# Patient Record
Sex: FEMALE | Race: WHITE | Employment: FULL TIME | URBAN - NONMETROPOLITAN AREA
[De-identification: names, ages, dates, MRNs, and addresses within clinical notes are randomized per-mention and may not be internally consistent; named-entity substitution may affect disease eponyms.]

---

## 2018-05-18 ENCOUNTER — HOSPITAL ENCOUNTER (EMERGENCY)
Age: 23
Discharge: HOME | End: 2018-05-18
Payer: COMMERCIAL

## 2018-05-18 ENCOUNTER — APPOINTMENT (OUTPATIENT)
Dept: GENERAL RADIOLOGY | Age: 23
End: 2018-05-18
Payer: COMMERCIAL

## 2018-05-18 VITALS
DIASTOLIC BLOOD PRESSURE: 66 MMHG | OXYGEN SATURATION: 99 % | TEMPERATURE: 98.4 F | HEART RATE: 88 BPM | WEIGHT: 130 LBS | RESPIRATION RATE: 16 BRPM | SYSTOLIC BLOOD PRESSURE: 108 MMHG

## 2018-05-18 DIAGNOSIS — S90.852A FOREIGN BODY IN LEFT FOOT, INITIAL ENCOUNTER: Primary | ICD-10-CM

## 2018-05-18 PROCEDURE — 99283 EMERGENCY DEPT VISIT LOW MDM: CPT

## 2018-05-18 PROCEDURE — 90471 IMMUNIZATION ADMIN: CPT | Performed by: PHYSICIAN ASSISTANT

## 2018-05-18 PROCEDURE — 28190 REMOVAL OF FOOT FOREIGN BODY: CPT

## 2018-05-18 PROCEDURE — 6360000002 HC RX W HCPCS: Performed by: PHYSICIAN ASSISTANT

## 2018-05-18 PROCEDURE — 73620 X-RAY EXAM OF FOOT: CPT

## 2018-05-18 PROCEDURE — 90715 TDAP VACCINE 7 YRS/> IM: CPT | Performed by: PHYSICIAN ASSISTANT

## 2018-05-18 RX ADMIN — TETANUS TOXOID, REDUCED DIPHTHERIA TOXOID AND ACELLULAR PERTUSSIS VACCINE, ADSORBED 0.5 ML: 5; 2.5; 8; 8; 2.5 SUSPENSION INTRAMUSCULAR at 15:18

## 2018-05-18 ASSESSMENT — ENCOUNTER SYMPTOMS: ROS SKIN COMMENTS: SEE HPI

## 2020-01-23 ENCOUNTER — INITIAL PRENATAL (OUTPATIENT)
Dept: OBGYN | Age: 25
End: 2020-01-23
Payer: COMMERCIAL

## 2020-01-23 ENCOUNTER — HOSPITAL ENCOUNTER (OUTPATIENT)
Age: 25
Setting detail: SPECIMEN
Discharge: HOME | End: 2020-01-23
Payer: COMMERCIAL

## 2020-01-23 VITALS
HEIGHT: 63 IN | WEIGHT: 143 LBS | BODY MASS INDEX: 25.34 KG/M2 | SYSTOLIC BLOOD PRESSURE: 108 MMHG | HEART RATE: 98 BPM | DIASTOLIC BLOOD PRESSURE: 70 MMHG

## 2020-01-23 LAB
ABO/RH: NORMAL
ABSOLUTE EOS #: 0.04 K/UL (ref 0–0.44)
ABSOLUTE IMMATURE GRANULOCYTE: <0.03 K/UL (ref 0–0.3)
ABSOLUTE LYMPH #: 1.27 K/UL (ref 1.1–3.7)
ABSOLUTE MONO #: 0.66 K/UL (ref 0.1–1.2)
AMPHETAMINE SCREEN URINE: NEGATIVE
ANTIBODY SCREEN: NEGATIVE
BARBITURATE SCREEN URINE: NEGATIVE
BASOPHILS # BLD: 0 % (ref 0–2)
BASOPHILS ABSOLUTE: <0.03 K/UL (ref 0–0.2)
BENZODIAZEPINE SCREEN, URINE: NEGATIVE
BUPRENORPHINE URINE: NEGATIVE
CANNABINOID SCREEN URINE: NEGATIVE
COCAINE METABOLITE, URINE: NEGATIVE
DIFFERENTIAL TYPE: ABNORMAL
EOSINOPHILS RELATIVE PERCENT: 1 % (ref 1–4)
HCT VFR BLD CALC: 38.5 % (ref 36.3–47.1)
HEMOGLOBIN: 12.8 G/DL (ref 11.9–15.1)
HEPATITIS B SURFACE ANTIGEN: NONREACTIVE
HEPATITIS C ANTIBODY: NONREACTIVE
HIV AG/AB: NONREACTIVE
IMMATURE GRANULOCYTES: 0 %
LYMPHOCYTES # BLD: 21 % (ref 24–43)
MCH RBC QN AUTO: 29.8 PG (ref 25.2–33.5)
MCHC RBC AUTO-ENTMCNC: 33.2 G/DL (ref 28.4–34.8)
MCV RBC AUTO: 89.5 FL (ref 82.6–102.9)
MDMA URINE: NORMAL
METHADONE SCREEN, URINE: NEGATIVE
METHAMPHETAMINE, URINE: NEGATIVE
MONOCYTES # BLD: 11 % (ref 3–12)
NRBC AUTOMATED: 0 PER 100 WBC
OPIATES, URINE: NEGATIVE
OXYCODONE SCREEN URINE: NEGATIVE
PDW BLD-RTO: 15.2 % (ref 11.8–14.4)
PHENCYCLIDINE, URINE: NEGATIVE
PLATELET # BLD: 253 K/UL (ref 138–453)
PLATELET ESTIMATE: ABNORMAL
PMV BLD AUTO: 10.1 FL (ref 8.1–13.5)
PROPOXYPHENE, URINE: NEGATIVE
RBC # BLD: 4.3 M/UL (ref 3.95–5.11)
RBC # BLD: ABNORMAL 10*6/UL
RUBV IGG SER QL: 120 IU/ML
SEG NEUTROPHILS: 67 % (ref 36–65)
SEGMENTED NEUTROPHILS ABSOLUTE COUNT: 4.16 K/UL (ref 1.5–8.1)
T. PALLIDUM, IGG: NONREACTIVE
TEST INFORMATION: NORMAL
TRICYCLIC ANTIDEPRESSANTS, UR: NEGATIVE
WBC # BLD: 6.2 K/UL (ref 3.5–11.3)
WBC # BLD: ABNORMAL 10*3/UL

## 2020-01-23 PROCEDURE — 86803 HEPATITIS C AB TEST: CPT

## 2020-01-23 PROCEDURE — 86780 TREPONEMA PALLIDUM: CPT

## 2020-01-23 PROCEDURE — 86850 RBC ANTIBODY SCREEN: CPT

## 2020-01-23 PROCEDURE — 87340 HEPATITIS B SURFACE AG IA: CPT

## 2020-01-23 PROCEDURE — 87491 CHLMYD TRACH DNA AMP PROBE: CPT

## 2020-01-23 PROCEDURE — 87086 URINE CULTURE/COLONY COUNT: CPT

## 2020-01-23 PROCEDURE — 86900 BLOOD TYPING SEROLOGIC ABO: CPT

## 2020-01-23 PROCEDURE — 86901 BLOOD TYPING SEROLOGIC RH(D): CPT

## 2020-01-23 PROCEDURE — 86762 RUBELLA ANTIBODY: CPT

## 2020-01-23 PROCEDURE — 87389 HIV-1 AG W/HIV-1&-2 AB AG IA: CPT

## 2020-01-23 PROCEDURE — 85025 COMPLETE CBC W/AUTO DIFF WBC: CPT

## 2020-01-23 PROCEDURE — 80306 DRUG TEST PRSMV INSTRMNT: CPT

## 2020-01-23 PROCEDURE — 0500F INITIAL PRENATAL CARE VISIT: CPT | Performed by: ADVANCED PRACTICE MIDWIFE

## 2020-01-23 PROCEDURE — 87591 N.GONORRHOEAE DNA AMP PROB: CPT

## 2020-01-23 RX ORDER — PNV NO.95/FERROUS FUM/FOLIC AC 28MG-0.8MG
1 TABLET ORAL
COMMUNITY

## 2020-01-23 ASSESSMENT — PATIENT HEALTH QUESTIONNAIRE - PHQ9
SUM OF ALL RESPONSES TO PHQ QUESTIONS 1-9: 0
1. LITTLE INTEREST OR PLEASURE IN DOING THINGS: 0
2. FEELING DOWN, DEPRESSED OR HOPELESS: 0
SUM OF ALL RESPONSES TO PHQ QUESTIONS 1-9: 0
SUM OF ALL RESPONSES TO PHQ9 QUESTIONS 1 & 2: 0

## 2020-01-23 NOTE — PROGRESS NOTES
New OB Visit    Date of service: 2020    Maribell Bowers  Is a 21 y.o.  female presenting for a New OB visit with Nurse. Name of Father of Juvenal Burnett is Qamar Parish and is involved. Pt does work at [employer]. [facility] Had dating U/S no prenatal labs done. May Jina all prenatal labs at office today. PT's PCP is: Annette Danielson MD     : 1995                                             Subjective:       Patient's last menstrual period was 10/30/2019. OB History    Para Term  AB Living   2 0 0 0 1 0   SAB TAB Ectopic Molar Multiple Live Births   0 0 0 0 0 0      # Outcome Date GA Lbr Abraham/2nd Weight Sex Delivery Anes PTL Lv   2 Current            1 AB 2016                     Social History     Tobacco Use   Smoking Status Never Smoker   Smokeless Tobacco Never Used        Social History     Substance and Sexual Activity   Alcohol Use No        Allergies: Patient has no known allergies. Current Outpatient Medications:     Prenatal Vit-Fe Fumarate-FA (PRENATAL VITAMIN) 27-0.8 MG TABS, Take 1 tablet by mouth, Disp: , Rfl:     escitalopram (LEXAPRO) 20 MG tablet, Take 20 mg by mouth daily, Disp: , Rfl:     ferrous sulfate 325 (65 FE) MG EC tablet, Take 1 tablet by mouth daily (Patient not taking: Reported on 2020), Disp: 90 tablet, Rfl: 0    ascorbic acid (VITAMIN C) 500 MG tablet, Take 500 mg by mouth daily. , Disp: , Rfl:     Multiple Vitamins-Minerals (MULTI COMPLETE PO), Take  by mouth., Disp: , Rfl:       Vital Signs Height 5' 3\" (1.6 m), weight 143 lb (64.9 kg), last menstrual period 10/30/2019. No results found for this visit on 20. Pain: none                            Nausea: not anymore      Vomiting: not anymore        Breast enlargement or tenderness: not anymore    Frequency of urination:yes      Fatigue: no         Patient history reviewed. Educational materials given and genetic testing reviewed. Breastfeeding handouts given and reviewed: Yes     If BMI over 35 was HgA1C drawn: N/A      If history of thyroid problem was TSH drawn: N/A       My chart set up and activated: Yes    If history of preeclampsia did we start baby ASA: N/A    Education:  Patient Instructions           January 3th (Friday 6:30-9:30) & 4th (Sat 9am-4pm)    February 5th, 12th, & 19th (Wednesdays 6:30-9:30)    March - 6th (Friday 6:30-9:30) & 7th (Sat 9am-4pm)    April - 6th, 13th and 20th (Mondays 6:30 PM to 9:30 PM)    May - 8th (Friday 6:30-9:30) & 9th (Sat 9am-4pm)    June -3rd, 10th, 17th, (Wednesdays from 6:30 PM to 9:30 PM)    July - 24th (Friday 6:30pm-9:30pm) & 25th (Sat 9am-4pm)    August -5th, 12th, 19th, (Wednesdays from 6:30 PM to 9:30 PM)    September - 11th (Friday 6:30-9:30) & 12th (Sat 9am-4pm)    October -7th, 14th, 21st, (Wednesdays from 6:30 PM to 9:30 PM)    November - 6th (Friday 6:30-9:30) & 4th (Sat 9am-4pm)    December - NO CLASSES      There is no charge for classes. Please bring a pillow to class. Bring a support person.     Childbirth education instructors are PAT Real RN and Lucero Cooper RN    To sign up for classes  Call the Obstetrics Department at  [facility] at  [phone]          BREASTFEEDING classes 2020    Classes are held in the [facility] Room 1  Class time 6:30pm-8:30pm    Thursday, January 9 at 6:30 PM    Monday, March 9 at 6:30 PM    Monday, April 27 at 6:30 PM    Thursday, June 25 at 6:30 PM    Thursday, August 27 at 6:30 PM    Thursday, November 12 at 6:30 PM     these classes are free    Classes are taught by Rocio Gray RN, BSN, Diana Archibald 60  Lactation consultant for [facility]    To sign up for classes  call the Obstetrics Department at  [facility] at  [phone]  Patient Education        Learning About Pregnancy  Your Care Instructions    Your health in the early weeks of your pregnancy is particularly handle raw meat, and fully cook all meat before you eat it. Wear gloves when you work in the yard or garden, and wash your hands well when you are done. Cat feces, raw or undercooked meat, and contaminated dirt can cause an infection that may harm your baby or lead to a miscarriage.     · Do not use saunas or hot tubs. Raising your body temperature may harm your baby.     · Avoid chemical fumes, paint fumes, or poisons.     · Do not use illegal drugs or alcohol. Medicines    · Review all of your medicines with your doctor. Some of your routine medicines may need to be changed to protect your baby.     · Use acetaminophen (Tylenol) to relieve minor problems, such as a mild headache or backache or a mild fever with cold symptoms. Do not use nonsteroidal anti-inflammatory drugs (NSAIDs), such as ibuprofen (Advil, Motrin) or naproxen (Aleve), unless your doctor says it is okay.     · Do not take two or more pain medicines at the same time unless the doctor told you to. Many pain medicines have acetaminophen, which is Tylenol. Too much acetaminophen (Tylenol) can be harmful.     · Take your medicines exactly as prescribed. Call your doctor if you think you are having a problem with your medicine.    To manage morning sickness    · If you feel sick when you first wake up, try eating a small snack (such as crackers) before you get out of bed. Allow some time to digest the snack, and then get out of bed slowly.     · Do not skip meals or go for long periods without eating. An empty stomach can make nausea worse.     · Eat small, frequent meals instead of three large meals each day.     · Drink plenty of fluids. Sports drinks, such as Gatorade or Powerade, are good choices.     · Eat foods that are high in protein but low in fat.     · If you are taking iron supplements, ask your doctor if they are necessary.  Iron can make nausea worse.     · Avoid any smells, such as coffee, that make you feel sick.     · Get lots of rest. Morning sickness may be worse when you are tired. Follow-up care is a key part of your treatment and safety. Be sure to make and go to all appointments, and call your doctor if you are having problems. It's also a good idea to know your test results and keep a list of the medicines you take. Where can you learn more? Go to https://chpepiceweb.[email or web address] org and sign in to your INXPO account. Enter G944 in the Spotivate box to learn more about \"Learning About Pregnancy. \"     If you do not have an account, please click on the \"Sign Up Now\" link. Current as of: May 29, 2019  Content Version: 12.3  © 9587-7231 Healthwise, SitScape. Care instructions adapted under license by [facility] ([facility]). If you have questions about a medical condition or this instruction, always ask your healthcare professional. [place] [address] any warranty or liability for your use of this information. Patient Education        Managing Morning Sickness: Care Instructions  Your Care Instructions    For many women, the toughest part of early pregnancy is morning sickness. Morning sickness can range from mild nausea to severe nausea with bouts of vomiting. Symptoms may be worse in the morning, although they can strike at any time of the day or night. If you have nausea, vomiting, or both, look for safe measures that can bring you relief. You can take simple steps at home to manage morning sickness. These steps include changing what and when you eat and avoiding certain foods and smells. Some women find that acupuncture and acupressure wristbands also help. Follow-up care is a key part of your treatment and safety. Be sure to make and go to all appointments, and call your doctor if you are having problems. It's also a good idea to know your test results and keep a list of the medicines you take. How can you care for yourself at home? · Keep food in your stomach, but not too much at once.  Your little urine. ? Feeling thirstier than usual.     · You are not able to keep down your medicine.     · You have pain in your belly or pelvis.    Watch closely for changes in your health, and be sure to contact your doctor if:    · You do not get better as expected. Where can you learn more? Go to https://chpepiceweb.healthUtility [email or web address] org and sign in to your [email or web address] account. Enter K836 in the Scoop.it box to learn more about \"Managing Morning Sickness: Care Instructions. \"     If you do not have an account, please click on the \"Sign Up Now\" link. Current as of: May 29, 2019  Content Version: 12.3  © 7814-5560 EMCAS. Care instructions adapted under license by [facility] ([facility]). If you have questions about a medical condition or this instruction, always ask your healthcare professional. [address] any warranty or liability for your use of this information. Patient Education        Nutrition During Pregnancy: Care Instructions  Your Care Instructions    Healthy eating when you are pregnant is important for you and your baby. It can help you feel well and have a successful pregnancy and delivery. During pregnancy your nutrition needs increase. Even if you have excellent eating habits, your doctor may recommend a multivitamin to make sure you get enough iron and folic acid. Many pregnant women wonder how much weight they should gain. In general, women who were at a healthy weight before they became pregnant should gain between 25 and 35 pounds. Women who were overweight before pregnancy are usually advised to gain 15 to 25 pounds. Women who were underweight before pregnancy are usually advised to gain 28 to 40 pounds. Your doctor will work with you to set a weight goal that is right for you. Gaining a healthy amount of weight helps you have a healthy baby. Follow-up care is a key part of your treatment and safety.  Be sure to make and go to all appointments, and pregnancy, your doctor will work with you to manage your weight gain. · Tell your doctor about all vitamins and supplements you take. When should you call for help? Watch closely for changes in your health, and be sure to contact your doctor if you have any problems. Where can you learn more? Go to https://chpepiceweb.[email or web address] org and sign in to your Keek account. Enter Y785 in the ZipMatch box to learn more about \"Nutrition During Pregnancy: Care Instructions. \"     If you do not have an account, please click on the \"Sign Up Now\" link. Current as of: May 29, 2019  Content Version: 12.3  © 7974-9223 Clipsure. Care instructions adapted under license by [facility] ([facility]). If you have questions about a medical condition or this instruction, always ask your healthcare professional. [address] any warranty or liability for your use of this information. Patient Education        Pregnancy Precautions: Care Instructions  Your Care Instructions    There is no sure way to prevent labor before your due date ( labor) or to prevent most other pregnancy problems. But there are things you can do to increase your chances of a healthy pregnancy. Go to your appointments, follow your doctor's advice, and take good care of yourself. Eat well, and exercise (if your doctor agrees). And make sure to drink plenty of water. Follow-up care is a key part of your treatment and safety. Be sure to make and go to all appointments, and call your doctor if you are having problems. It's also a good idea to know your test results and keep a list of the medicines you take. How can you care for yourself at home? · Make sure you go to your prenatal appointments. At each visit, your doctor will check your blood pressure. Your doctor will also check to see if you have protein in your urine. High blood pressure and protein in urine are signs of preeclampsia.  This condition can be dangerous for you and your baby. · Drink plenty of fluids, enough so that your urine is light yellow or clear like water. Dehydration can cause contractions. If you have kidney, heart, or liver disease and have to limit fluids, talk with your doctor before you increase the amount of fluids you drink. · Tell your doctor right away if you notice any symptoms of an infection, such as:  ? Burning when you urinate. ? A foul-smelling discharge from your vagina. ? Vaginal itching. ? Unexplained fever. ? Unusual pain or soreness in your uterus or lower belly. · Eat a balanced diet. Include plenty of foods that are high in calcium and iron. ? Foods high in calcium include milk, cheese, yogurt, almonds, and broccoli. ? Foods high in iron include red meat, shellfish, poultry, eggs, beans, raisins, whole-grain bread, and leafy green vegetables. · Do not smoke. If you need help quitting, talk to your doctor about stop-smoking programs and medicines. These can increase your chances of quitting for good. · Do not drink alcohol or use illegal drugs. · Follow your doctor's directions about activity. Your doctor will let you know how much, if any, exercise you can do. · Ask your doctor if you can have sex. If you are at risk for early labor, your doctor may ask you to not have sex. · Take care to prevent falls. During pregnancy, your joints are loose, and your balance is off. Sports such as bicycling, skiing, or in-line skating can increase your risk of falling. And don't ride horses or motorcycles, dive, water ski, scuba dive, or parachute jump while you are pregnant. · Avoid getting very hot. Do not use saunas or hot tubs. Avoid staying out in the sun in hot weather for long periods. Take acetaminophen (Tylenol) to lower a high fever. · Do not take any over-the-counter or herbal medicines or supplements without talking to your doctor or pharmacist first.  When should you call for help?   Call 911 anytime you think Incorporated disclaims any warranty or liability for your use of this information. Patient Education        Weeks 6 to 10 of Your Pregnancy: Care Instructions  Your Care Instructions    Congratulations on your pregnancy. This is an exciting and important time for you. During the first 6 to 10 weeks of your pregnancy, your body goes through many changes. Your baby grows very fast, even though you cannot feel it yet. You may start to notice that you feel different, both in your body and your emotions. Because each woman's pregnancy is unique, there is no right way to feel. You may feel the healthiest you have ever been, or you may feel tired or sick to your stomach (\"morning sickness\"). These early weeks are a time to make healthy choices and to eat the best foods for you and your baby. This care sheet will give you some ideas. This is also a good time to think about birth defects testing. These are tests done during pregnancy to look for possible problems with the baby. First trimester tests for birth defects can be done between 8 and 17 weeks of pregnancy, depending on the test. Talk with your doctor about what kinds of tests are available. Follow-up care is a key part of your treatment and safety. Be sure to make and go to all appointments, and call your doctor if you are having problems. It's also a good idea to know your test results and keep a list of the medicines you take. How can you care for yourself at home? Eat well  · Eat at least 3 meals and 2 healthy snacks every day. Eat fresh, whole foods, including:  ? 7 or more servings of bread, tortillas, cereal, rice, pasta, or oatmeal.  ? 3 or more servings of vegetables, especially leafy green vegetables. ? 2 or more servings of fruits. ? 3 or more servings of milk, yogurt, or cheese. ? 2 or more servings of meat, turkey, chicken, fish, eggs, or dried beans. · Drink plenty of fluids, especially water.  Avoid sodas and other sweetened Enter G112 in the [facility] box to learn more about \"Weeks 6 to 10 of Your Pregnancy: Care Instructions. \"     If you do not have an account, please click on the \"Sign Up Now\" link. Current as of: May 29, 2019  Content Version: 12.3  © 0919-6292 Healthwise, Leonar3Do. Care instructions adapted under license by [facility] ([facility]). If you have questions about a medical condition or this instruction, always ask your healthcare professional. [place] [address] any warranty or liability for your use of this information. Patient Education        Learning About When to Call Your Doctor During Pregnancy (Up to 20 Weeks)  Your Care Instructions    It's common to have concerns about what might be a problem during pregnancy. Although most pregnant women don't have any serious problems, it's important to know when to call your doctor if you have certain symptoms. These are general suggestions. Your doctor may give you some more information about when to call. When to call your doctor (up to 20 weeks)  Call 911 anytime you think you may need emergency care. For example, call if:  · You passed out (lost consciousness). Call your doctor now or seek immediate medical care if:  · You have a fever. · You have vaginal bleeding. · You are dizzy or lightheaded, or you feel like you may faint. · You have symptoms of a urinary tract infection. These may include:  ? Pain or burning when you urinate. ? A frequent need to urinate without being able to pass much urine. ? Pain in the flank, which is just below the rib cage and above the waist on either side of the back. ? Blood in your urine. · You have belly pain. · You think you are having contractions. · You have a sudden release of fluid from your vagina. Watch closely for changes in your health, and be sure to contact your doctor if:  · You have vaginal discharge that smells bad. · You have other concerns about your pregnancy.   Follow-up care is a key part of your treatment and safety. Be sure to make and go to all appointments, and call your doctor if you are having problems. It's also a good idea to know your test results and keep a list of the medicines you take. Where can you learn more? Go to https://chpepiceweb.healthAerpio [email or web address] org and sign in to your MetaMaterials account. Enter V846 in the emids box to learn more about \"Learning About When to Call Your Doctor During Pregnancy (Up to 20 Weeks). \"     If you do not have an account, please click on the \"Sign Up Now\" link. Current as of: May 29, 2019  Content Version: 12.3  © 1384-8971 Healthwise, [employer]Metic Life Sciences. Care instructions adapted under license by [facility] ([facility]). If you have questions about a medical condition or this instruction, always ask your healthcare professional. [address] any warranty or liability for your use of this information. Assessment:      Diagnosis Orders   1. Amenorrhea  C.trachomatis N.gonorrhoeae DNA, Urine    Hepatitis C Antibody    HIV Screen    PRENATAL PROFILE I    Prenatal type and screen    Urine Culture    Urine Drug Screen, Comprehensive   2. Positive urine pregnancy test  C.trachomatis N.gonorrhoeae DNA, Urine    Hepatitis C Antibody    HIV Screen    PRENATAL PROFILE I    Prenatal type and screen    Urine Culture    Urine Drug Screen, Comprehensive   3.  Encounter for supervision of normal pregnancy in first trimester, unspecified   C.trachomatis N.gonorrhoeae DNA, Urine    Hepatitis C Antibody    HIV Screen    PRENATAL PROFILE I    Prenatal type and screen    Urine Culture    Urine Drug Screen, Comprehensive       Plan: Order Routine Prenatal Lab Yes     Order additional testing if requested         Order Prenatal Vitamins   No: OTC             Appointment with Betsy Burnett CNM in 1 week for total body exam if indicated      Nurse:   Bienvenido Ragsdale

## 2020-01-24 LAB
C. TRACHOMATIS DNA ,URINE: NEGATIVE
CULTURE: NORMAL
Lab: NORMAL
N. GONORRHOEAE DNA, URINE: NEGATIVE
SPECIMEN DESCRIPTION: NORMAL
SPECIMEN DESCRIPTION: NORMAL

## 2020-01-30 ENCOUNTER — ROUTINE PRENATAL (OUTPATIENT)
Dept: OBGYN | Age: 25
End: 2020-01-30
Payer: COMMERCIAL

## 2020-01-30 ENCOUNTER — HOSPITAL ENCOUNTER (OUTPATIENT)
Age: 25
Setting detail: SPECIMEN
Discharge: HOME | End: 2020-01-30
Payer: COMMERCIAL

## 2020-01-30 VITALS — BODY MASS INDEX: 25.58 KG/M2 | WEIGHT: 144.4 LBS | DIASTOLIC BLOOD PRESSURE: 74 MMHG | SYSTOLIC BLOOD PRESSURE: 112 MMHG

## 2020-01-30 PROCEDURE — G0145 SCR C/V CYTO,THINLAYER,RESCR: HCPCS

## 2020-01-30 PROCEDURE — 0502F SUBSEQUENT PRENATAL CARE: CPT | Performed by: ADVANCED PRACTICE MIDWIFE

## 2020-01-30 NOTE — PROGRESS NOTES
discontinued Delilah Vigil's ascorbic acid, Multiple Vitamins-Minerals (MULTI COMPLETE PO), and escitalopram. I am also having her maintain her ferrous sulfate and Prenatal Vitamin. Return in about 4 weeks (around 2/27/2020) for ob. There are no Patient Instructions on file for this visit.           Aline Roberts,1/30/2020 2:49 PM

## 2020-02-11 LAB — CYTOLOGY REPORT: NORMAL

## 2020-02-24 ENCOUNTER — ROUTINE PRENATAL (OUTPATIENT)
Dept: OBGYN | Age: 25
End: 2020-02-24
Payer: COMMERCIAL

## 2020-02-24 VITALS — SYSTOLIC BLOOD PRESSURE: 112 MMHG | BODY MASS INDEX: 25.86 KG/M2 | WEIGHT: 146 LBS | DIASTOLIC BLOOD PRESSURE: 70 MMHG

## 2020-02-24 PROCEDURE — 0502F SUBSEQUENT PRENATAL CARE: CPT | Performed by: ADVANCED PRACTICE MIDWIFE

## 2020-02-24 RX ORDER — BUTALBITAL, ACETAMINOPHEN AND CAFFEINE 50; 325; 40 MG/1; MG/1; MG/1
TABLET ORAL
Qty: 30 TABLET | Refills: 1 | Status: ON HOLD
Start: 2020-02-24 | End: 2020-08-05 | Stop reason: HOSPADM

## 2020-03-12 ENCOUNTER — ROUTINE PRENATAL (OUTPATIENT)
Dept: OBGYN | Age: 25
End: 2020-03-12
Payer: COMMERCIAL

## 2020-03-12 VITALS — DIASTOLIC BLOOD PRESSURE: 74 MMHG | BODY MASS INDEX: 26.75 KG/M2 | SYSTOLIC BLOOD PRESSURE: 110 MMHG | WEIGHT: 151 LBS

## 2020-03-12 PROCEDURE — 0502F SUBSEQUENT PRENATAL CARE: CPT | Performed by: ADVANCED PRACTICE MIDWIFE

## 2020-03-12 NOTE — PROGRESS NOTES
Konrad Shields is here at 20w0d for:    Chief Complaint   Patient presents with    Routine Prenatal Visit     Follow up in house ultrasound. Estimated Due Date: Estimated Date of Delivery: 20    OB History    Para Term  AB Living   2 0 0 0 1 0   SAB TAB Ectopic Molar Multiple Live Births   0 0 0 0 0 0      # Outcome Date GA Lbr Abraham/2nd Weight Sex Delivery Anes PTL Lv   2 Current            1 AB 2016                Past Medical History:   Diagnosis Date    Endometriosis     Migraine        Past Surgical History:   Procedure Laterality Date    APPENDECTOMY      LAPAROSCOPY      TONSILLECTOMY         Social History     Tobacco Use   Smoking Status Never Smoker   Smokeless Tobacco Never Used        Social History     Substance and Sexual Activity   Alcohol Use No       No results found for this visit on 20. Vitals:  /74   Wt 151 lb (68.5 kg)   LMP 10/30/2019   BMI 26.75 kg/m²   Estimated body mass index is 26.75 kg/m² as calculated from the following:    Height as of 20: 5' 3\" (1.6 m). Weight as of this encounter: 151 lb (68.5 kg). HPI: here for routine ob visit, denies c/o, anatomic sono WNL     PT denies fever, chills, nausea and vomiting       Abdomen: enlarged, gravid, soft, nontender     Results reviewed today:    No results found for this visit on 20. See prenatal vital sign section and fetal assessment section    ASSESSMENT & Plan    Diagnosis Orders   1. Encounter for supervision of normal first pregnancy in second trimester     2. 20 weeks gestation of pregnancy          19.6 WK IUP  CL: 4.3 cm  HR: 156 bpm  Anterior placenta, vertex presentation  Ovaries: WNL  Gender:  female  Active fetal movements  All visualized fetal anatomy WNL          I am having Konrad Shields maintain her ferrous sulfate, Prenatal Vitamin, and butalbital-acetaminophen-caffeine. Return in about 4 weeks (around 2020) for ob.     There are no Patient Instructions on file for this visit.           Margarita Roberts,3/12/2020 9:22 AM

## 2020-04-08 ENCOUNTER — TELEMEDICINE (OUTPATIENT)
Dept: OBGYN | Age: 25
End: 2020-04-08
Payer: COMMERCIAL

## 2020-04-08 VITALS — WEIGHT: 152 LBS | BODY MASS INDEX: 26.93 KG/M2

## 2020-04-08 PROCEDURE — 0502F SUBSEQUENT PRENATAL CARE: CPT | Performed by: ADVANCED PRACTICE MIDWIFE

## 2020-05-07 ENCOUNTER — ROUTINE PRENATAL (OUTPATIENT)
Dept: OBGYN | Age: 25
End: 2020-05-07
Payer: COMMERCIAL

## 2020-05-07 VITALS — WEIGHT: 158 LBS | DIASTOLIC BLOOD PRESSURE: 70 MMHG | BODY MASS INDEX: 27.99 KG/M2 | SYSTOLIC BLOOD PRESSURE: 120 MMHG

## 2020-05-07 LAB
CHP ED QC CHECK: NORMAL
GLUCOSE BLD-MCNC: 107 MG/DL
HGB, POC: 12.1

## 2020-05-07 PROCEDURE — 0502F SUBSEQUENT PRENATAL CARE: CPT | Performed by: ADVANCED PRACTICE MIDWIFE

## 2020-05-07 NOTE — PROGRESS NOTES
Sadia Thompson is here at 28w0d for:    Chief Complaint   Patient presents with    Routine Prenatal Visit     1 hour gtt. today. Estimated Due Date: Estimated Date of Delivery: 20    OB History    Para Term  AB Living   2 0 0 0 1 0   SAB TAB Ectopic Molar Multiple Live Births   0 0 0 0 0 0      # Outcome Date GA Lbr Abraham/2nd Weight Sex Delivery Anes PTL Lv   2 Current            1 AB 2016                Past Medical History:   Diagnosis Date    Endometriosis     Migraine        Past Surgical History:   Procedure Laterality Date    APPENDECTOMY      LAPAROSCOPY      TONSILLECTOMY         Social History     Tobacco Use   Smoking Status Never Smoker   Smokeless Tobacco Never Used        Social History     Substance and Sexual Activity   Alcohol Use No       No results found for this visit on 20. Vitals:  /70   Wt 158 lb (71.7 kg)   LMP 10/30/2019   BMI 27.99 kg/m²   Estimated body mass index is 27.99 kg/m² as calculated from the following:    Height as of 20: 5' 3\" (1.6 m). Weight as of this encounter: 158 lb (71.7 kg). HPI: here for routine ob visit and gtt, denies c/o     PT denies fever, chills, nausea and vomiting       Abdomen: enlarged, gravid, soft, nontender     Results reviewed today:    No results found for this visit on 20. See prenatal vital sign section and fetal assessment section    ASSESSMENT & Plan    Diagnosis Orders   1. Encounter for supervision of normal first pregnancy in third trimester     2. 28 weeks gestation of pregnancy               I am having Sadia Thompson maintain her ferrous sulfate, Prenatal Vitamin, and butalbital-acetaminophen-caffeine. Return in about 2 weeks (around 2020) for ob 30wkUS+tdap. There are no Patient Instructions on file for this visit.           Lakeshia Roberts,2020 8:51 AM

## 2020-05-21 ENCOUNTER — ROUTINE PRENATAL (OUTPATIENT)
Dept: OBGYN | Age: 25
End: 2020-05-21
Payer: COMMERCIAL

## 2020-05-21 VITALS — WEIGHT: 159.2 LBS | BODY MASS INDEX: 28.2 KG/M2 | SYSTOLIC BLOOD PRESSURE: 116 MMHG | DIASTOLIC BLOOD PRESSURE: 74 MMHG

## 2020-05-21 PROCEDURE — 0502F SUBSEQUENT PRENATAL CARE: CPT | Performed by: ADVANCED PRACTICE MIDWIFE

## 2020-05-21 PROCEDURE — 90715 TDAP VACCINE 7 YRS/> IM: CPT | Performed by: ADVANCED PRACTICE MIDWIFE

## 2020-05-21 PROCEDURE — 90471 IMMUNIZATION ADMIN: CPT | Performed by: ADVANCED PRACTICE MIDWIFE

## 2020-05-21 NOTE — PROGRESS NOTES
Tivis Bumpers is here at 30w0d for:    Chief Complaint   Patient presents with    Routine Prenatal Visit     Follow up in house ultrasound. TDAP given. Estimated Due Date: Estimated Date of Delivery: 20    OB History    Para Term  AB Living   2 0 0 0 1 0   SAB TAB Ectopic Molar Multiple Live Births   0 0 0 0 0 0      # Outcome Date GA Lbr Abraham/2nd Weight Sex Delivery Anes PTL Lv   2 Current            1 AB 2016                Past Medical History:   Diagnosis Date    Endometriosis     Migraine        Past Surgical History:   Procedure Laterality Date    APPENDECTOMY      LAPAROSCOPY      TONSILLECTOMY         Social History     Tobacco Use   Smoking Status Never Smoker   Smokeless Tobacco Never Used        Social History     Substance and Sexual Activity   Alcohol Use No       No results found for this visit on 20. Vitals:  /74   Wt 159 lb 3.2 oz (72.2 kg)   LMP 10/30/2019   BMI 28.20 kg/m²   Estimated body mass index is 28.2 kg/m² as calculated from the following:    Height as of 20: 5' 3\" (1.6 m). Weight as of this encounter: 159 lb 3.2 oz (72.2 kg). HPI: Here for 30 week check and f/u sono. Feeling good. PT denies fever, chills, nausea and vomiting       Abdomen: enlarged, gravid, soft, nontender. Results reviewed today:    30.1 WK IUP  EFW: 50%  CL: 3.6 cm  LEMUEL: 12.4 cm  HR: 146 bpm  Anterior gr 2 placenta, vertex presentation  Active fetal movements    See prenatal vital sign section and fetal assessment section    ASSESSMENT & Plan    Diagnosis Orders   1. Need for Tdap vaccination  Tdap (age 6y and older) IM ([address] [place])   2. Encounter for supervision of normal first pregnancy in third trimester     3. 30 weeks gestation of pregnancy               I am having Tivis Bumpers maintain her ferrous sulfate, Prenatal Vitamin, and butalbital-acetaminophen-caffeine. Return in about 2 weeks (around 2020) for ob.     There are no Patient

## 2020-06-04 ENCOUNTER — ROUTINE PRENATAL (OUTPATIENT)
Dept: OBGYN | Age: 25
End: 2020-06-04
Payer: COMMERCIAL

## 2020-06-04 VITALS — SYSTOLIC BLOOD PRESSURE: 118 MMHG | BODY MASS INDEX: 28.17 KG/M2 | WEIGHT: 159 LBS | DIASTOLIC BLOOD PRESSURE: 74 MMHG

## 2020-06-04 PROCEDURE — 0502F SUBSEQUENT PRENATAL CARE: CPT | Performed by: ADVANCED PRACTICE MIDWIFE

## 2020-06-18 ENCOUNTER — ROUTINE PRENATAL (OUTPATIENT)
Dept: OBGYN | Age: 25
End: 2020-06-18
Payer: COMMERCIAL

## 2020-06-18 VITALS — BODY MASS INDEX: 28.41 KG/M2 | SYSTOLIC BLOOD PRESSURE: 112 MMHG | WEIGHT: 160.4 LBS | DIASTOLIC BLOOD PRESSURE: 78 MMHG

## 2020-06-18 PROCEDURE — 0502F SUBSEQUENT PRENATAL CARE: CPT | Performed by: ADVANCED PRACTICE MIDWIFE

## 2020-06-18 NOTE — PROGRESS NOTES
Chanel Rose is here at 34w0d for:    Chief Complaint   Patient presents with    Routine Prenatal Visit       Estimated Due Date: Estimated Date of Delivery: 20    OB History    Para Term  AB Living   2 0 0 0 1 0   SAB TAB Ectopic Molar Multiple Live Births   0 0 0 0 0 0      # Outcome Date GA Lbr Abraham/2nd Weight Sex Delivery Anes PTL Lv   2 Current            1 AB 2016                Past Medical History:   Diagnosis Date    Endometriosis     Migraine        Past Surgical History:   Procedure Laterality Date    APPENDECTOMY      LAPAROSCOPY      TONSILLECTOMY         Social History     Tobacco Use   Smoking Status Never Smoker   Smokeless Tobacco Never Used        Social History     Substance and Sexual Activity   Alcohol Use No       No results found for this visit on 20. Vitals:  /78   Wt 160 lb 6.4 oz (72.8 kg)   LMP 10/30/2019   BMI 28.41 kg/m²   Estimated body mass index is 28.41 kg/m² as calculated from the following:    Height as of 20: 5' 3\" (1.6 m). Weight as of this encounter: 160 lb 6.4 oz (72.8 kg). HPI: here for routine ob visit, denies c/o     PT denies fever, chills, nausea and vomiting       Abdomen: enlarged, gravid, soft, nontender     Results reviewed today:    No results found for this visit on 20. See prenatal vital sign section and fetal assessment section    ASSESSMENT & Plan    Diagnosis Orders   1. Encounter for supervision of normal first pregnancy in third trimester     2. 34 weeks gestation of pregnancy               I am having Chanel Rose maintain her ferrous sulfate, Prenatal Vitamin, and butalbital-acetaminophen-caffeine. Return in about 2 weeks (around 2020) for obgbs with Dr. Michael Car then [facility] after with cece. There are no Patient Instructions on file for this visit.           Shelbie Roberts,2020 1:30 PM

## 2020-07-02 ENCOUNTER — ROUTINE PRENATAL (OUTPATIENT)
Dept: OBGYN | Age: 25
End: 2020-07-02
Payer: COMMERCIAL

## 2020-07-02 ENCOUNTER — HOSPITAL ENCOUNTER (OUTPATIENT)
Age: 25
Setting detail: SPECIMEN
Discharge: HOME | End: 2020-07-02
Payer: COMMERCIAL

## 2020-07-02 VITALS — WEIGHT: 161 LBS | BODY MASS INDEX: 28.52 KG/M2 | DIASTOLIC BLOOD PRESSURE: 66 MMHG | SYSTOLIC BLOOD PRESSURE: 118 MMHG

## 2020-07-02 PROCEDURE — 0502F SUBSEQUENT PRENATAL CARE: CPT | Performed by: OBSTETRICS & GYNECOLOGY

## 2020-07-02 PROCEDURE — 87081 CULTURE SCREEN ONLY: CPT

## 2020-07-02 NOTE — PROGRESS NOTES
The patient has good fetal movements, a few irregular contractions. No problems at this time. GBS performed.

## 2020-07-05 LAB
CULTURE: NORMAL
Lab: NORMAL
SPECIMEN DESCRIPTION: NORMAL

## 2020-07-06 ENCOUNTER — ROUTINE PRENATAL (OUTPATIENT)
Dept: OBGYN | Age: 25
End: 2020-07-06
Payer: COMMERCIAL

## 2020-07-06 VITALS — DIASTOLIC BLOOD PRESSURE: 74 MMHG | BODY MASS INDEX: 28.34 KG/M2 | WEIGHT: 160 LBS | SYSTOLIC BLOOD PRESSURE: 112 MMHG

## 2020-07-06 PROCEDURE — 0502F SUBSEQUENT PRENATAL CARE: CPT | Performed by: ADVANCED PRACTICE MIDWIFE

## 2020-07-06 NOTE — PROGRESS NOTES
Manuel Baires is here at 36w4d for:    Chief Complaint   Patient presents with   [employer] Routine Prenatal Visit       Estimated Due Date: Estimated Date of Delivery: 20    OB History    Para Term  AB Living   2 0 0 0 1 0   SAB TAB Ectopic Molar Multiple Live Births   0 0 0 0 0 0      # Outcome Date GA Lbr Abraham/2nd Weight Sex Delivery Anes PTL Lv   2 Current            1 AB 2016                Past Medical History:   Diagnosis Date    Endometriosis     Migraine        Past Surgical History:   Procedure Laterality Date    APPENDECTOMY      LAPAROSCOPY      TONSILLECTOMY         Social History     Tobacco Use   Smoking Status Never Smoker   Smokeless Tobacco Never Used        Social History     Substance and Sexual Activity   Alcohol Use No       No results found for this visit on 20. Vitals:  /74   Wt 160 lb (72.6 kg)   LMP 10/30/2019   BMI 28.34 kg/m²   Estimated body mass index is 28.34 kg/m² as calculated from the following:    Height as of 20: 5' 3\" (1.6 m). Weight as of this encounter: 160 lb (72.6 kg). HPI: here for routine ob visit, denies c/o     PT denies fever, chills, nausea and vomiting       Abdomen: enlarged, gravid, soft, nontender     Results reviewed today:    No results found for this visit on 20. See prenatal vital sign section and fetal assessment section    ASSESSMENT & Plan    Diagnosis Orders   1. Encounter for supervision of other normal pregnancy in third trimester     2. 36 weeks gestation of pregnancy               I am having Manuel Baires maintain her ferrous sulfate, Prenatal Vitamin, and butalbital-acetaminophen-caffeine. Return in about 1 week (around 2020) for ob. There are no Patient Instructions on file for this visit.           Dionicio Roberts,2020 12:05 PM

## 2020-07-16 ENCOUNTER — ROUTINE PRENATAL (OUTPATIENT)
Dept: OBGYN | Age: 25
End: 2020-07-16
Payer: COMMERCIAL

## 2020-07-16 ENCOUNTER — HOSPITAL ENCOUNTER (OUTPATIENT)
Dept: LAB | Age: 25
Setting detail: SPECIMEN
Discharge: HOME | End: 2020-07-16
Payer: COMMERCIAL

## 2020-07-16 VITALS — BODY MASS INDEX: 28.48 KG/M2 | SYSTOLIC BLOOD PRESSURE: 118 MMHG | DIASTOLIC BLOOD PRESSURE: 64 MMHG | WEIGHT: 160.8 LBS

## 2020-07-16 PROCEDURE — U0003 INFECTIOUS AGENT DETECTION BY NUCLEIC ACID (DNA OR RNA); SEVERE ACUTE RESPIRATORY SYNDROME CORONAVIRUS 2 (SARS-COV-2) (CORONAVIRUS DISEASE [COVID-19]), AMPLIFIED PROBE TECHNIQUE, MAKING USE OF HIGH THROUGHPUT TECHNOLOGIES AS DESCRIBED BY CMS-2020-01-R: HCPCS

## 2020-07-16 PROCEDURE — 0502F SUBSEQUENT PRENATAL CARE: CPT | Performed by: ADVANCED PRACTICE MIDWIFE

## 2020-07-16 NOTE — PROGRESS NOTES
Liz Brower is here at 38w0d for:    Chief Complaint   Patient presents with    Routine Prenatal Visit     Feeling good. Estimated Due Date: Estimated Date of Delivery: 20    OB History    Para Term  AB Living   2 0 0 0 1 0   SAB TAB Ectopic Molar Multiple Live Births   0 0 0 0 0 0      # Outcome Date GA Lbr Abraham/2nd Weight Sex Delivery Anes PTL Lv   2 Current            1 AB 2016                Past Medical History:   Diagnosis Date    Endometriosis     Migraine        Past Surgical History:   Procedure Laterality Date    APPENDECTOMY      LAPAROSCOPY      TONSILLECTOMY         Social History     Tobacco Use   Smoking Status Never Smoker   Smokeless Tobacco Never Used        Social History     Substance and Sexual Activity   Alcohol Use No       No results found for this visit on 20. Vitals:  /64   Wt 160 lb 12.8 oz (72.9 kg)   LMP 10/30/2019   BMI 28.48 kg/m²   Estimated body mass index is 28.48 kg/m² as calculated from the following:    Height as of 20: 5' 3\" (1.6 m). Weight as of this encounter: 160 lb 12.8 oz (72.9 kg). HPI: here for routine ob visit, denies c/o, has covid restriction questions     PT denies fever, chills, nausea and vomiting       Abdomen: enlarged, gravid, soft, nontender     Results reviewed today:    No results found for this visit on 20. See prenatal vital sign section and fetal assessment section    ASSESSMENT & Plan    Diagnosis Orders   1. Encounter for supervision of normal first pregnancy in third trimester     2. 38 weeks gestation of pregnancy               I am having Liz Brower maintain her ferrous sulfate, Prenatal Vitamin, and butalbital-acetaminophen-caffeine. Return in about 1 week (around 2020) for ob. There are no Patient Instructions on file for this visit.           Fatuma Roberts,2020 9:12 AM

## 2020-07-21 LAB — SARS-COV-2, NAA: NOT DETECTED

## 2020-07-23 ENCOUNTER — ROUTINE PRENATAL (OUTPATIENT)
Dept: OBGYN | Age: 25
End: 2020-07-23
Payer: COMMERCIAL

## 2020-07-23 VITALS — SYSTOLIC BLOOD PRESSURE: 112 MMHG | DIASTOLIC BLOOD PRESSURE: 70 MMHG | BODY MASS INDEX: 28.52 KG/M2 | WEIGHT: 161 LBS

## 2020-07-23 PROCEDURE — 0502F SUBSEQUENT PRENATAL CARE: CPT | Performed by: ADVANCED PRACTICE MIDWIFE

## 2020-07-23 NOTE — PROGRESS NOTES
Thad Todd is here at 39w0d for:    Chief Complaint   Patient presents with    Routine Prenatal Visit     Cramps and abdominal tightness off and on. Estimated Due Date: Estimated Date of Delivery: 20    OB History    Para Term  AB Living   2 0 0 0 1 0   SAB TAB Ectopic Molar Multiple Live Births   0 0 0 0 0 0      # Outcome Date GA Lbr Abraham/2nd Weight Sex Delivery Anes PTL Lv   2 Current            1 AB 2016                Past Medical History:   Diagnosis Date    Endometriosis     Migraine        Past Surgical History:   Procedure Laterality Date    APPENDECTOMY      LAPAROSCOPY      TONSILLECTOMY         Social History     Tobacco Use   Smoking Status Never Smoker   Smokeless Tobacco Never Used        Social History     Substance and Sexual Activity   Alcohol Use No       No results found for this visit on 20. Vitals:  /70   Wt 161 lb (73 kg)   LMP 10/30/2019   BMI 28.52 kg/m²   Estimated body mass index is 28.52 kg/m² as calculated from the following:    Height as of 20: 5' 3\" (1.6 m). Weight as of this encounter: 161 lb (73 kg). HPI: here for routine ob visit, c/o intermittant cramping     PT denies fever, chills, nausea and vomiting       Abdomen: enlarged, gravid, soft, nontender     Results reviewed today:    No results found for this visit on 20. See prenatal vital sign section and fetal assessment section    ASSESSMENT & Plan    Diagnosis Orders   1. Encounter for supervision of normal first pregnancy in third trimester     2. 39 weeks gestation of pregnancy               I am having Thad Todd maintain her ferrous sulfate, Prenatal Vitamin, and butalbital-acetaminophen-caffeine. Return in about 1 week (around 2020) for obgbs, ob sono for efw may need to rs appt to coordinate. There are no Patient Instructions on file for this visit.           Sydnie Roberts,2020 9:34 AM

## 2020-07-27 ENCOUNTER — HOSPITAL ENCOUNTER (OUTPATIENT)
Age: 25
Setting detail: SPECIMEN
Discharge: HOME | End: 2020-07-27
Payer: COMMERCIAL

## 2020-07-27 ENCOUNTER — ROUTINE PRENATAL (OUTPATIENT)
Dept: OBGYN | Age: 25
End: 2020-07-27
Payer: COMMERCIAL

## 2020-07-27 VITALS — DIASTOLIC BLOOD PRESSURE: 74 MMHG | SYSTOLIC BLOOD PRESSURE: 112 MMHG | WEIGHT: 162.6 LBS | BODY MASS INDEX: 28.8 KG/M2

## 2020-07-27 PROCEDURE — 87081 CULTURE SCREEN ONLY: CPT

## 2020-07-27 PROCEDURE — 0502F SUBSEQUENT PRENATAL CARE: CPT | Performed by: ADVANCED PRACTICE MIDWIFE

## 2020-07-27 NOTE — PROGRESS NOTES
Andrzej Muller is here at 39w4d for:    Chief Complaint   Patient presents with    Routine Prenatal Visit     Follow up in house ultrasound, GBS today. Estimated Due Date: Estimated Date of Delivery: 20    OB History    Para Term  AB Living   2 0 0 0 1 0   SAB TAB Ectopic Molar Multiple Live Births   0 0 0 0 0 0      # Outcome Date GA Lbr Abraham/2nd Weight Sex Delivery Anes PTL Lv   2 Current            1 AB 2016                Past Medical History:   Diagnosis Date    Endometriosis     Migraine        Past Surgical History:   Procedure Laterality Date    APPENDECTOMY      LAPAROSCOPY      TONSILLECTOMY         Social History     Tobacco Use   Smoking Status Never Smoker   Smokeless Tobacco Never Used        Social History     Substance and Sexual Activity   Alcohol Use No       No results found for this visit on 20. Vitals:  /74   Wt 162 lb 9.6 oz (73.8 kg)   LMP 10/30/2019   BMI 28.80 kg/m²   Estimated body mass index is 28.8 kg/m² as calculated from the following:    Height as of 20: 5' 3\" (1.6 m). Weight as of this encounter: 162 lb 9.6 oz (73.8 kg). HPI: here for routine ob visit, desires term induction     PT denies fever, chills, nausea and vomiting       Abdomen: enlarged, gravid, soft, nontender     Results reviewed today:  Sono:  38.2 WK IUP  EFW: 56%,  7 lb 11 oz  LEMUEL:12.9 cm  HR: 143 bpm  Anterior gr 3 placenta, vertex presentation  Active fetal movements  No results found for this visit on 20. See prenatal vital sign section and fetal assessment section    ASSESSMENT & Plan    Diagnosis Orders   1. Encounter for supervision of normal first pregnancy in third trimester     2. 39 weeks gestation of pregnancy  Culture, Strep B Screen, Vaginal/Rectal             I am having Andrzej Muller maintain her ferrous sulfate, Prenatal Vitamin, and butalbital-acetaminophen-caffeine.     Return in about 3 days (around 2020) for ob.    There are no Patient Instructions on file for this visit.           Susan Roberts,7/27/2020 12:09 PM

## 2020-07-30 ENCOUNTER — ROUTINE PRENATAL (OUTPATIENT)
Dept: OBGYN | Age: 25
End: 2020-07-30
Payer: COMMERCIAL

## 2020-07-30 VITALS — WEIGHT: 164 LBS | SYSTOLIC BLOOD PRESSURE: 116 MMHG | DIASTOLIC BLOOD PRESSURE: 72 MMHG | BODY MASS INDEX: 29.05 KG/M2

## 2020-07-30 LAB
CULTURE: NORMAL
Lab: NORMAL
SPECIMEN DESCRIPTION: NORMAL

## 2020-07-30 PROCEDURE — 0502F SUBSEQUENT PRENATAL CARE: CPT | Performed by: ADVANCED PRACTICE MIDWIFE

## 2020-07-31 NOTE — PROGRESS NOTES
Deric Barrera is here at 40w0d for:    Chief Complaint   Patient presents with   [employer] Routine Prenatal Visit       Estimated Due Date: Estimated Date of Delivery: 20    OB History    Para Term  AB Living   2 0 0 0 1 0   SAB TAB Ectopic Molar Multiple Live Births   0 0 0 0 0 0      # Outcome Date GA Lbr Abraham/2nd Weight Sex Delivery Anes PTL Lv   2 Current            1 AB 2016                Past Medical History:   Diagnosis Date    Endometriosis     Migraine        Past Surgical History:   Procedure Laterality Date    APPENDECTOMY      LAPAROSCOPY      TONSILLECTOMY         Social History     Tobacco Use   Smoking Status Never Smoker   Smokeless Tobacco Never Used        Social History     Substance and Sexual Activity   Alcohol Use No       No results found for this visit on 20. Vitals:  /72   Wt 164 lb (74.4 kg)   LMP 10/30/2019   BMI 29.05 kg/m²   Estimated body mass index is 29.05 kg/m² as calculated from the following:    Height as of 20: 5' 3\" (1.6 m). Weight as of this encounter: 164 lb (74.4 kg). HPI: here for routine ob visit, denies c/o requests induction next week     PT denies fever, chills, nausea and vomiting       Abdomen: enlarged, gravid, soft, nontender     Results reviewed today:    No results found for this visit on 20. See prenatal vital sign section and fetal assessment section    ASSESSMENT & Plan    Diagnosis Orders   1. 40 weeks gestation of pregnancy     2. Encounter for supervision of normal first pregnancy in third trimester               I am having Deric Barrera maintain her ferrous sulfate, Prenatal Vitamin, and butalbital-acetaminophen-caffeine. Return 8/3 pm induction. There are no Patient Instructions on file for this visit.           Kj oRberts,2020 11:32 PM

## 2020-08-03 ENCOUNTER — HOSPITAL ENCOUNTER (INPATIENT)
Age: 25
LOS: 1 days | Discharge: HOME | End: 2020-08-05
Attending: ADVANCED PRACTICE MIDWIFE | Admitting: ADVANCED PRACTICE MIDWIFE
Payer: COMMERCIAL

## 2020-08-03 LAB
ABSOLUTE EOS #: 0.03 K/UL (ref 0–0.44)
ABSOLUTE IMMATURE GRANULOCYTE: <0.03 K/UL (ref 0–0.3)
ABSOLUTE LYMPH #: 2.29 K/UL (ref 1.1–3.7)
ABSOLUTE MONO #: 0.8 K/UL (ref 0.1–1.2)
AMPHETAMINE SCREEN URINE: NEGATIVE
BARBITURATE SCREEN URINE: NEGATIVE
BASOPHILS # BLD: 0 % (ref 0–2)
BASOPHILS ABSOLUTE: <0.03 K/UL (ref 0–0.2)
BENZODIAZEPINE SCREEN, URINE: NEGATIVE
BUPRENORPHINE URINE: NEGATIVE
CANNABINOID SCREEN URINE: NEGATIVE
COCAINE METABOLITE, URINE: NEGATIVE
DIFFERENTIAL TYPE: ABNORMAL
EOSINOPHILS RELATIVE PERCENT: 0 % (ref 1–4)
HCT VFR BLD CALC: 36.7 % (ref 36.3–47.1)
HEMOGLOBIN: 12.7 G/DL (ref 11.9–15.1)
IMMATURE GRANULOCYTES: 0 %
LYMPHOCYTES # BLD: 30 % (ref 24–43)
MCH RBC QN AUTO: 32.5 PG (ref 25.2–33.5)
MCHC RBC AUTO-ENTMCNC: 34.6 G/DL (ref 28.4–34.8)
MCV RBC AUTO: 93.9 FL (ref 82.6–102.9)
MDMA URINE: NORMAL
METHADONE SCREEN, URINE: NEGATIVE
METHAMPHETAMINE, URINE: NEGATIVE
MONOCYTES # BLD: 10 % (ref 3–12)
NRBC AUTOMATED: 0 PER 100 WBC
OPIATES, URINE: NEGATIVE
OXYCODONE SCREEN URINE: NEGATIVE
PDW BLD-RTO: 13 % (ref 11.8–14.4)
PHENCYCLIDINE, URINE: NEGATIVE
PLATELET # BLD: 181 K/UL (ref 138–453)
PLATELET ESTIMATE: ABNORMAL
PMV BLD AUTO: 11 FL (ref 8.1–13.5)
PROPOXYPHENE, URINE: NEGATIVE
RBC # BLD: 3.91 M/UL (ref 3.95–5.11)
RBC # BLD: ABNORMAL 10*6/UL
SEG NEUTROPHILS: 60 % (ref 36–65)
SEGMENTED NEUTROPHILS ABSOLUTE COUNT: 4.57 K/UL (ref 1.5–8.1)
TEST INFORMATION: NORMAL
TRICYCLIC ANTIDEPRESSANTS, UR: NEGATIVE
WBC # BLD: 7.7 K/UL (ref 3.5–11.3)
WBC # BLD: ABNORMAL 10*3/UL

## 2020-08-03 PROCEDURE — 80306 DRUG TEST PRSMV INSTRMNT: CPT

## 2020-08-03 PROCEDURE — 36415 COLL VENOUS BLD VENIPUNCTURE: CPT

## 2020-08-03 PROCEDURE — 85025 COMPLETE CBC W/AUTO DIFF WBC: CPT

## 2020-08-03 PROCEDURE — 6370000000 HC RX 637 (ALT 250 FOR IP): Performed by: ADVANCED PRACTICE MIDWIFE

## 2020-08-03 RX ORDER — ACETAMINOPHEN 500 MG
1000 TABLET ORAL EVERY 6 HOURS
Status: DISCONTINUED | OUTPATIENT
Start: 2020-08-03 | End: 2020-08-04

## 2020-08-03 RX ORDER — MISOPROSTOL 100 UG/1
900 TABLET ORAL PRN
Status: CANCELLED | OUTPATIENT
Start: 2020-08-03

## 2020-08-03 RX ORDER — ACETAMINOPHEN 325 MG/1
650 TABLET ORAL EVERY 4 HOURS PRN
Status: CANCELLED | OUTPATIENT
Start: 2020-08-03

## 2020-08-03 RX ORDER — ONDANSETRON 2 MG/ML
4 INJECTION INTRAMUSCULAR; INTRAVENOUS EVERY 6 HOURS PRN
Status: CANCELLED | OUTPATIENT
Start: 2020-08-03

## 2020-08-03 RX ORDER — MISOPROSTOL 100 UG/1
900 TABLET ORAL PRN
Status: DISCONTINUED | OUTPATIENT
Start: 2020-08-03 | End: 2020-08-05 | Stop reason: HOSPADM

## 2020-08-03 RX ORDER — BUTORPHANOL TARTRATE 1 MG/ML
1 INJECTION, SOLUTION INTRAMUSCULAR; INTRAVENOUS
Status: CANCELLED | OUTPATIENT
Start: 2020-08-03

## 2020-08-03 RX ORDER — METHYLERGONOVINE MALEATE 0.2 MG/ML
200 INJECTION INTRAVENOUS PRN
Status: DISCONTINUED | OUTPATIENT
Start: 2020-08-03 | End: 2020-08-05 | Stop reason: HOSPADM

## 2020-08-03 RX ORDER — SODIUM CHLORIDE 0.9 % (FLUSH) 0.9 %
10 SYRINGE (ML) INJECTION EVERY 12 HOURS SCHEDULED
Status: DISCONTINUED | OUTPATIENT
Start: 2020-08-03 | End: 2020-08-04

## 2020-08-03 RX ORDER — SODIUM CHLORIDE 0.9 % (FLUSH) 0.9 %
10 SYRINGE (ML) INJECTION EVERY 12 HOURS SCHEDULED
Status: CANCELLED | OUTPATIENT
Start: 2020-08-03

## 2020-08-03 RX ORDER — SODIUM CHLORIDE, SODIUM LACTATE, POTASSIUM CHLORIDE, CALCIUM CHLORIDE 600; 310; 30; 20 MG/100ML; MG/100ML; MG/100ML; MG/100ML
INJECTION, SOLUTION INTRAVENOUS CONTINUOUS
Status: DISCONTINUED | OUTPATIENT
Start: 2020-08-03 | End: 2020-08-04

## 2020-08-03 RX ORDER — ACETAMINOPHEN 325 MG/1
650 TABLET ORAL EVERY 4 HOURS PRN
Status: DISCONTINUED | OUTPATIENT
Start: 2020-08-03 | End: 2020-08-03

## 2020-08-03 RX ORDER — ONDANSETRON 2 MG/ML
4 INJECTION INTRAMUSCULAR; INTRAVENOUS EVERY 6 HOURS PRN
Status: DISCONTINUED | OUTPATIENT
Start: 2020-08-03 | End: 2020-08-04

## 2020-08-03 RX ORDER — SEVOFLURANE 250 ML/250ML
1 LIQUID RESPIRATORY (INHALATION) CONTINUOUS PRN
Status: CANCELLED | OUTPATIENT
Start: 2020-08-03

## 2020-08-03 RX ORDER — CARBOPROST TROMETHAMINE 250 UG/ML
250 INJECTION, SOLUTION INTRAMUSCULAR PRN
Status: CANCELLED | OUTPATIENT
Start: 2020-08-03

## 2020-08-03 RX ORDER — SEVOFLURANE 250 ML/250ML
1 LIQUID RESPIRATORY (INHALATION) CONTINUOUS PRN
Status: DISCONTINUED | OUTPATIENT
Start: 2020-08-03 | End: 2020-08-04

## 2020-08-03 RX ORDER — SODIUM CHLORIDE 0.9 % (FLUSH) 0.9 %
10 SYRINGE (ML) INJECTION PRN
Status: CANCELLED | OUTPATIENT
Start: 2020-08-03

## 2020-08-03 RX ORDER — METHYLERGONOVINE MALEATE 0.2 MG/ML
200 INJECTION INTRAVENOUS PRN
Status: CANCELLED | OUTPATIENT
Start: 2020-08-03

## 2020-08-03 RX ORDER — SODIUM CHLORIDE 0.9 % (FLUSH) 0.9 %
10 SYRINGE (ML) INJECTION PRN
Status: DISCONTINUED | OUTPATIENT
Start: 2020-08-03 | End: 2020-08-04

## 2020-08-03 RX ORDER — SODIUM CHLORIDE, SODIUM LACTATE, POTASSIUM CHLORIDE, CALCIUM CHLORIDE 600; 310; 30; 20 MG/100ML; MG/100ML; MG/100ML; MG/100ML
INJECTION, SOLUTION INTRAVENOUS CONTINUOUS
Status: CANCELLED | OUTPATIENT
Start: 2020-08-03

## 2020-08-03 RX ORDER — CARBOPROST TROMETHAMINE 250 UG/ML
250 INJECTION, SOLUTION INTRAMUSCULAR PRN
Status: DISCONTINUED | OUTPATIENT
Start: 2020-08-03 | End: 2020-08-05 | Stop reason: HOSPADM

## 2020-08-03 RX ORDER — LIDOCAINE HYDROCHLORIDE 10 MG/ML
30 INJECTION, SOLUTION EPIDURAL; INFILTRATION; INTRACAUDAL; PERINEURAL PRN
Status: CANCELLED | OUTPATIENT
Start: 2020-08-03

## 2020-08-03 RX ORDER — LIDOCAINE HYDROCHLORIDE 10 MG/ML
30 INJECTION, SOLUTION EPIDURAL; INFILTRATION; INTRACAUDAL; PERINEURAL PRN
Status: DISCONTINUED | OUTPATIENT
Start: 2020-08-03 | End: 2020-08-04

## 2020-08-03 RX ADMIN — ACETAMINOPHEN 1000 MG: 500 TABLET, FILM COATED ORAL at 20:53

## 2020-08-03 RX ADMIN — Medication 25 MCG: at 22:59

## 2020-08-03 RX ADMIN — Medication 25 MCG: at 19:09

## 2020-08-03 ASSESSMENT — PAIN SCALES - GENERAL: PAINLEVEL_OUTOF10: 0

## 2020-08-04 PROBLEM — Z34.03 ENCOUNTER FOR PRENATAL CARE IN THIRD TRIMESTER OF FIRST PREGNANCY: Status: ACTIVE | Noted: 2020-08-04

## 2020-08-04 LAB
ABSOLUTE EOS #: <0.03 K/UL (ref 0–0.44)
ABSOLUTE IMMATURE GRANULOCYTE: 0.04 K/UL (ref 0–0.3)
ABSOLUTE LYMPH #: 1.33 K/UL (ref 1.1–3.7)
ABSOLUTE MONO #: 1.35 K/UL (ref 0.1–1.2)
BASOPHILS # BLD: 0 % (ref 0–2)
BASOPHILS ABSOLUTE: <0.03 K/UL (ref 0–0.2)
DIFFERENTIAL TYPE: ABNORMAL
EOSINOPHILS RELATIVE PERCENT: 0 % (ref 1–4)
HCT VFR BLD CALC: 34.5 % (ref 36.3–47.1)
HEMOGLOBIN: 12 G/DL (ref 11.9–15.1)
IMMATURE GRANULOCYTES: 0 %
LYMPHOCYTES # BLD: 11 % (ref 24–43)
MCH RBC QN AUTO: 32.7 PG (ref 25.2–33.5)
MCHC RBC AUTO-ENTMCNC: 34.8 G/DL (ref 28.4–34.8)
MCV RBC AUTO: 94 FL (ref 82.6–102.9)
MONOCYTES # BLD: 11 % (ref 3–12)
NRBC AUTOMATED: 0 PER 100 WBC
PDW BLD-RTO: 12.8 % (ref 11.8–14.4)
PLATELET # BLD: 145 K/UL (ref 138–453)
PLATELET ESTIMATE: ABNORMAL
PMV BLD AUTO: 11.4 FL (ref 8.1–13.5)
RBC # BLD: 3.67 M/UL (ref 3.95–5.11)
RBC # BLD: ABNORMAL 10*6/UL
SEG NEUTROPHILS: 77 % (ref 36–65)
SEGMENTED NEUTROPHILS ABSOLUTE COUNT: 9.37 K/UL (ref 1.5–8.1)
WBC # BLD: 12.1 K/UL (ref 3.5–11.3)
WBC # BLD: ABNORMAL 10*3/UL

## 2020-08-04 PROCEDURE — 6360000002 HC RX W HCPCS: Performed by: ADVANCED PRACTICE MIDWIFE

## 2020-08-04 PROCEDURE — 2500000003 HC RX 250 WO HCPCS: Performed by: ADVANCED PRACTICE MIDWIFE

## 2020-08-04 PROCEDURE — 7200000001 HC VAGINAL DELIVERY

## 2020-08-04 PROCEDURE — 1220000000 HC SEMI PRIVATE OB R&B

## 2020-08-04 PROCEDURE — 2580000003 HC RX 258: Performed by: ADVANCED PRACTICE MIDWIFE

## 2020-08-04 PROCEDURE — 59409 OBSTETRICAL CARE: CPT | Performed by: ADVANCED PRACTICE MIDWIFE

## 2020-08-04 PROCEDURE — 10907ZC DRAINAGE OF AMNIOTIC FLUID, THERAPEUTIC FROM PRODUCTS OF CONCEPTION, VIA NATURAL OR ARTIFICIAL OPENING: ICD-10-PCS | Performed by: NURSE PRACTITIONER

## 2020-08-04 PROCEDURE — 59200 INSERT CERVICAL DILATOR: CPT

## 2020-08-04 PROCEDURE — 3E0P7VZ INTRODUCTION OF HORMONE INTO FEMALE REPRODUCTIVE, VIA NATURAL OR ARTIFICIAL OPENING: ICD-10-PCS | Performed by: NURSE PRACTITIONER

## 2020-08-04 PROCEDURE — 6370000000 HC RX 637 (ALT 250 FOR IP): Performed by: ADVANCED PRACTICE MIDWIFE

## 2020-08-04 PROCEDURE — 85025 COMPLETE CBC W/AUTO DIFF WBC: CPT

## 2020-08-04 RX ORDER — MODIFIED LANOLIN
OINTMENT (GRAM) TOPICAL PRN
Status: DISCONTINUED | OUTPATIENT
Start: 2020-08-04 | End: 2020-08-05 | Stop reason: HOSPADM

## 2020-08-04 RX ORDER — IBUPROFEN 800 MG/1
800 TABLET ORAL EVERY 8 HOURS
Status: DISCONTINUED | OUTPATIENT
Start: 2020-08-04 | End: 2020-08-05 | Stop reason: HOSPADM

## 2020-08-04 RX ORDER — METHYLERGONOVINE MALEATE 0.2 MG/1
200 TABLET ORAL EVERY 6 HOURS SCHEDULED
Status: COMPLETED | OUTPATIENT
Start: 2020-08-04 | End: 2020-08-05

## 2020-08-04 RX ORDER — METHYLERGONOVINE MALEATE 0.2 MG/ML
200 INJECTION INTRAVENOUS
Status: ACTIVE | OUTPATIENT
Start: 2020-08-04 | End: 2020-08-04

## 2020-08-04 RX ORDER — ACETAMINOPHEN 325 MG/1
650 TABLET ORAL EVERY 4 HOURS PRN
Status: DISCONTINUED | OUTPATIENT
Start: 2020-08-04 | End: 2020-08-05 | Stop reason: HOSPADM

## 2020-08-04 RX ORDER — DOCUSATE SODIUM 100 MG/1
100 CAPSULE, LIQUID FILLED ORAL 2 TIMES DAILY PRN
Status: DISCONTINUED | OUTPATIENT
Start: 2020-08-04 | End: 2020-08-05 | Stop reason: HOSPADM

## 2020-08-04 RX ORDER — LIDOCAINE HYDROCHLORIDE 10 MG/ML
5 INJECTION, SOLUTION EPIDURAL; INFILTRATION; INTRACAUDAL; PERINEURAL ONCE
Status: COMPLETED | OUTPATIENT
Start: 2020-08-04 | End: 2020-08-04

## 2020-08-04 RX ORDER — SODIUM CHLORIDE 0.9 % (FLUSH) 0.9 %
10 SYRINGE (ML) INJECTION EVERY 12 HOURS SCHEDULED
Status: DISCONTINUED | OUTPATIENT
Start: 2020-08-04 | End: 2020-08-05 | Stop reason: HOSPADM

## 2020-08-04 RX ORDER — ONDANSETRON 2 MG/ML
4 INJECTION INTRAMUSCULAR; INTRAVENOUS EVERY 6 HOURS PRN
Status: DISCONTINUED | OUTPATIENT
Start: 2020-08-04 | End: 2020-08-05 | Stop reason: HOSPADM

## 2020-08-04 RX ORDER — SODIUM CHLORIDE 0.9 % (FLUSH) 0.9 %
10 SYRINGE (ML) INJECTION PRN
Status: DISCONTINUED | OUTPATIENT
Start: 2020-08-04 | End: 2020-08-05 | Stop reason: HOSPADM

## 2020-08-04 RX ADMIN — METHYLERGONOVINE MALEATE 200 MCG: 0.2 TABLET ORAL at 16:11

## 2020-08-04 RX ADMIN — LIDOCAINE HYDROCHLORIDE 5 ML: 10 INJECTION, SOLUTION EPIDURAL; INFILTRATION; INTRACAUDAL; PERINEURAL at 09:30

## 2020-08-04 RX ADMIN — METHYLERGONOVINE MALEATE 200 MCG: 0.2 INJECTION INTRAVENOUS at 07:59

## 2020-08-04 RX ADMIN — Medication 25 MCG: at 02:44

## 2020-08-04 RX ADMIN — ACETAMINOPHEN 650 MG: 325 TABLET, FILM COATED ORAL at 11:34

## 2020-08-04 RX ADMIN — METHYLERGONOVINE MALEATE 200 MCG: 0.2 TABLET ORAL at 10:36

## 2020-08-04 RX ADMIN — OXYTOCIN 333 MILLI-UNITS/MIN: 10 INJECTION INTRAVENOUS at 07:53

## 2020-08-04 RX ADMIN — IBUPROFEN 800 MG: 800 TABLET, FILM COATED ORAL at 08:38

## 2020-08-04 RX ADMIN — SODIUM CHLORIDE, POTASSIUM CHLORIDE, SODIUM LACTATE AND CALCIUM CHLORIDE: 600; 310; 30; 20 INJECTION, SOLUTION INTRAVENOUS at 04:39

## 2020-08-04 RX ADMIN — ACETAMINOPHEN 1000 MG: 500 TABLET, FILM COATED ORAL at 02:44

## 2020-08-04 ASSESSMENT — PAIN SCALES - GENERAL
PAINLEVEL_OUTOF10: 1
PAINLEVEL_OUTOF10: 6
PAINLEVEL_OUTOF10: 1

## 2020-08-04 ASSESSMENT — PAIN DESCRIPTION - DESCRIPTORS
DESCRIPTORS: ACHING
DESCRIPTORS: CRAMPING

## 2020-08-04 NOTE — H&P
Department of Obstetrics and Gynecology   Obstetrics History and Physical  Regla Reyes Haley Almarazmore  H&P Admission Inpatient  Note        CHIEF COMPLAINT:  Request for induction at post edc    HISTORY OF PRESENT ILLNESS:      The patient is a 31 y.o. female at 39w6d. OB History        2    Para   0    Term   0       0    AB   1    Living   0       SAB   0    TAB   0    Ectopic   0    Molar   0    Multiple   0    Live Births   0            Patient presents with a chief complaint as above and is being admitted for induction    Estimated Due Date: Estimated Date of Delivery: 20    PRENATAL CARE:    Complicated by: none    PAST OB HISTORY:  OB History        2    Para   0    Term   0       0    AB   1    Living   0       SAB   0    TAB   0    Ectopic   0    Molar   0    Multiple   0    Live Births   0                Past Medical History:        Diagnosis Date    Endometriosis     Migraine      Past Surgical History:        Procedure Laterality Date    APPENDECTOMY      LAPAROSCOPY      TONSILLECTOMY       Allergies:  Patient has no known allergies.     Social History:    Social History     Socioeconomic History    Marital status: Single     Spouse name: Not on file    Number of children: Not on file    Years of education: Not on file    Highest education level: Not on file   Occupational History    Not on file   Social Needs    Financial resource strain: Not on file    Food insecurity     Worry: Not on file     Inability: Not on file    Transportation needs     Medical: Not on file     Non-medical: Not on file   Tobacco Use    Smoking status: Never Smoker    Smokeless tobacco: Never Used   Substance and Sexual Activity    Alcohol use: No    Drug use: No    Sexual activity: Yes     Partners: Male   Lifestyle    Physical activity     Days per week: Not on file     Minutes per session: Not on file    Stress: Not on file   Relationships    Social connections     Talks on phone: Not on file     Gets together: Not on file     Attends [employer] service: Not on file     Active member of club or organization: Not on file     Attends meetings of clubs or organizations: Not on file     Relationship status: Not on file    Intimate partner violence     Fear of current or ex partner: Not on file     Emotionally abused: Not on file     Physically abused: Not on file     Forced sexual activity: Not on file   Other Topics Concern    Not on file   Social History Narrative    Not on file     Family History:       Problem Relation Age of Onset    Other Other         no family h/o stroke or ovarian cancer     Breast Cancer Maternal Aunt      Medications Prior to Admission:  Medications Prior to Admission: Prenatal Vit-Fe Fumarate-FA (PRENATAL VITAMIN) 27-0.8 MG TABS, Take 1 tablet by mouth  butalbital-acetaminophen-caffeine (FIORICET, ESGIC) -40 MG per tablet, 1-2 tablets every 6 hours as needed for headache, no more than 6 tabs in 24 hours (Patient not taking: Reported on 3/12/2020)  ferrous sulfate 325 (65 FE) MG EC tablet, Take 1 tablet by mouth daily (Patient not taking: Reported on 1/23/2020)    REVIEW OF SYSTEMS:    CONSTITUTIONAL:  negative  RESPIRATORY:  negative  CARDIOVASCULAR:  negative  GASTROINTESTINAL:  negative  ALLERGIC/IMMUNOLOGIC:  negative  NEUROLOGICAL:  negative  BEHAVIOR/PSYCH:  negative    PHYSICAL EXAM:  Vitals:    08/04/20 0500 08/04/20 0523 08/04/20 0600 08/04/20 0622   BP:  127/78  136/80   Pulse:  84  74   Resp: 18  18    Temp: 98.1 °F (36.7 °C)      TempSrc: Oral      Weight:       Height:         General appearance:  awake, alert, cooperative, no apparent distress, and appears stated age  Neurologic:  Awake, alert, oriented to name, place and time. Lungs:  No increased work of breathing, good air exchange  Abdomen:  Soft, non tender, gravid, consistent with her gestational age, EFW by Leopald's manouever was 7 lbs   Fetal heart rate:  Reassuring.   Pelvis: Adequate pelvis  Cervix: 1 cm thick high  Contraction frequency:  Irregular  minutes    Membranes:  Intact    Labs:   CBC:   Lab Results   Component Value Date    WBC 7.7 08/03/2020    RBC 3.91 08/03/2020    HGB 12.7 08/03/2020    HCT 36.7 08/03/2020    MCV 93.9 08/03/2020    MCH 32.5 08/03/2020    MCHC 34.6 08/03/2020    RDW 13.0 08/03/2020     08/03/2020    MPV 11.0 08/03/2020       ASSESSMENT AND PLAN:    Estimated length of stay: 2 days    Active Problems:    Primip, term      Labor: Admit, anticipate normal delivery, routine labor orders  Fetus: Reassuring, Cat 1  GBS: No  Other: cytotec for cervical ripening, then proceed with induction in am.      Faisal Roberts CNM,8/4/2020 7:05 AM

## 2020-08-04 NOTE — PROGRESS NOTES
Lactation education:    [x] Latch/ good latch vs shallow latch/ steps to obtaining deep latch    [x] How to know if infant is eating enough/ feedings per 24 hours, wet/dirty diapers    [x] Feeding/satiety cues      Lactation education resources given:     [x]  How to Breastfeed your baby - [facility] (1-) publication      [x]  Information on feeding cues     [x]  Support group handout    [x]  Breastpump cleaning guidelines - [facility]     [x]  Breastfeeding & Safe Sleep handout - [facility] (1-) publication    [x]  Calling All Dads! Handout - [facility] (1-) publication    []  Breast and Nipple Care - [facility]     []  [address]    []  [place]    []  Going Back to Work - [facility]    []  Preventing Engorgement - [facility]    Supplies given:    []  Brush, soap and basin for breastpump cleaning    []  Insurance pump provided through [facility]    []  Insurance pump provided through [employer] to patient, patient verbalizes understanding.         Signed:  Obdulia Paulino RN, BSN, IBCLC

## 2020-08-04 NOTE — FLOWSHEET NOTE
SHAKIRA ALVARADOM CALLED BACK TO ROOM FOR MANUAL EXAM AT THIS TIME. SEVERAL BLOOD CLOTS REMOVED AT THIS TIME PER Jeffrey Lorenzo CNM.

## 2020-08-04 NOTE — FLOWSHEET NOTE
SHAKIRA PEACE CNM AT BEDSIDE D/T PT CONTINUES TO TRICKLE BLEEDING. MANUAL EXAM COMPLETED AT THIS TIME PER Han Troy CNM.

## 2020-08-04 NOTE — L&D DELIVERY NOTE
Mother's Information    Labor Events     labor?:  No  Rupture type:  Artificial=AROM, Intact  Fluid color:  Clear  Fluid odor:  None     Mother Delivery Information    Episiotomy:  None  Lacerations:  None  Vaginal Delivery Est. Blood Loss (mL):  450  Surgical or Additional Est. Blood Loss (mL):  0 (View Only):  Edit in Flowsheets   Combined Est. Blood Loss (mL):  450        Gaietto, Baby Girl Delilah [[id number]]    Labor Events     labor?:  No   steroids?:  None  Cervical ripening date/time:     Antibiotics received during labor?:  No  Rupture date/time: 20    Rupture type:  Artificial=AROM  Fluid color:  Clear  Fluid odor:  None  Induction:  Misoprostol  Indications for induction:  Elective  Augmentation:  AROM  Indications for augmentation:  Ineffective Contraction Pattern   Additional complications:   OB: DELIVERY - COMPLICATIONS   Post-dates pregnancy         Labor Event Times    Labor onset date/time:     Dilation complete date/time:   20 EDT   Start pushing:    Decision time (emergent ):        Anesthesia    Method:  None     Assisted Delivery Details    Forceps attempted?:  No  Vacuum extractor attempted?:  No     Document Additional Attempt       Document Additional Attempt             Shoulder Dystocia    Shoulder dystocia present?:  No  Add Second Maneuver  Add Third Maneuver  Add Fourth Maneuver  Add Fifth Maneuver  Add Sixth Maneuver  Add Seventh Maneuver  Add Eighth Maneuver  Add Ninth Maneuver      Presentation    Presentation:  Vertex  _:  Occiput  _:  Anterior     [place] Information    Head delivery date/time:  2020 07:28:00   Changing the 's delivery date/time could affect patient care.:     Delivery date/time:   20 2288   Delivery type:  Vaginal, Spontaneous    Details:         Delivery Providers    Delivering clinician:  GERMANIA Felton CNM   Provider Role    Xavier Cueto RN Delivery Nurse    Pablito Chen

## 2020-08-04 NOTE — PROGRESS NOTES
Patient ambulatory to [facility] floor for scheduled cytotec induction with FOB. Oriented to room.

## 2020-08-04 NOTE — PLAN OF CARE
Problem: Anxiety:  Goal: Level of anxiety will decrease  Description: Level of anxiety will decrease  Outcome: Ongoing     Problem: Breathing Pattern - Ineffective:  Goal: Able to breathe comfortably  Description: Able to breathe comfortably  Outcome: Ongoing     Problem: Fluid Volume - Imbalance:  Goal: Absence of imbalanced fluid volume signs and symptoms  Description: Absence of imbalanced fluid volume signs and symptoms  Outcome: Ongoing  Goal: Absence of intrapartum hemorrhage signs and symptoms  Description: Absence of intrapartum hemorrhage signs and symptoms  Outcome: Ongoing     Problem: Infection - Intrapartum Infection:  Goal: Will show no infection signs and symptoms  Description: Will show no infection signs and symptoms  Outcome: Ongoing     Problem: Labor Process - Prolonged:  Goal: Labor progression, first stage, within specified pattern  Description: Labor progression, first stage, within specified pattern  Outcome: Ongoing  Goal: Labor progession, second stage, within specified pattern  Description: Labor progession, second stage, within specified pattern  Outcome: Ongoing  Goal: Uterine contractions within specified parameters  Description: Uterine contractions within specified parameters  Outcome: Ongoing     Problem:  Screening:  Goal: Ability to make informed decisions regarding treatment has improved  Description: Ability to make informed decisions regarding treatment has improved  Outcome: Ongoing     Problem: Pain - Acute:  Goal: Pain level will decrease  Description: Pain level will decrease  Outcome: Ongoing  Goal: Able to cope with pain  Description: Able to cope with pain  Outcome: Ongoing     Problem: Tissue Perfusion - Uteroplacental, Altered:  Description: [TRUNCATED] For intrapartum patients with recurrent variable decelerations of the fetal heart rate, consider transcervical amnioinfusion. For patients in labor, avoid prophylactic use of continuous maternal oxygen supplementation to prevent nonreassu . ..   Goal: Absence of abnormal fetal heart rate pattern  Description: Absence of abnormal fetal heart rate pattern  Outcome: Ongoing     Problem: Urinary Retention:  Goal: Experiences of bladder distention will decrease  Description: Experiences of bladder distention will decrease  Outcome: Ongoing  Goal: Urinary elimination within specified parameters  Description: Urinary elimination within specified parameters  Outcome: Ongoing

## 2020-08-04 NOTE — PLAN OF CARE
Problem: Anxiety:  Goal: Level of anxiety will decrease  Description: Level of anxiety will decrease  Outcome: Completed     Problem: Breathing Pattern - Ineffective:  Goal: Able to breathe comfortably  Description: Able to breathe comfortably  Outcome: Completed     Problem: Fluid Volume - Imbalance:  Goal: Absence of imbalanced fluid volume signs and symptoms  Description: Absence of imbalanced fluid volume signs and symptoms  Outcome: Completed  Goal: Absence of intrapartum hemorrhage signs and symptoms  Description: Absence of intrapartum hemorrhage signs and symptoms  Outcome: Completed     Problem: Infection - Intrapartum Infection:  Goal: Will show no infection signs and symptoms  Description: Will show no infection signs and symptoms  Outcome: Completed     Problem: Labor Process - Prolonged:  Goal: Labor progression, first stage, within specified pattern  Description: Labor progression, first stage, within specified pattern  Outcome: Completed  Goal: Labor progession, second stage, within specified pattern  Description: Labor progession, second stage, within specified pattern  Outcome: Completed  Goal: Uterine contractions within specified parameters  Description: Uterine contractions within specified parameters  Outcome: Completed     Problem:  Screening:  Goal: Ability to make informed decisions regarding treatment has improved  Description: Ability to make informed decisions regarding treatment has improved  Outcome: Completed     Problem: Pain - Acute:  Goal: Pain level will decrease  Description: Pain level will decrease  Outcome: Completed  Goal: Able to cope with pain  Description: Able to cope with pain  Outcome: Completed     Problem: Tissue Perfusion - Uteroplacental, Altered:  Goal: Absence of abnormal fetal heart rate pattern  Description: Absence of abnormal fetal heart rate pattern  Outcome: Completed     Problem: Urinary Retention:  Goal: Experiences of bladder distention will

## 2020-08-05 VITALS
WEIGHT: 163 LBS | HEIGHT: 63 IN | HEART RATE: 72 BPM | TEMPERATURE: 97.7 F | RESPIRATION RATE: 16 BRPM | BODY MASS INDEX: 28.88 KG/M2 | DIASTOLIC BLOOD PRESSURE: 74 MMHG | SYSTOLIC BLOOD PRESSURE: 114 MMHG

## 2020-08-05 PROCEDURE — 6370000000 HC RX 637 (ALT 250 FOR IP): Performed by: ADVANCED PRACTICE MIDWIFE

## 2020-08-05 RX ADMIN — METHYLERGONOVINE MALEATE 200 MCG: 0.2 TABLET ORAL at 00:02

## 2020-08-05 RX ADMIN — Medication 40 EACH: at 00:05

## 2020-08-05 RX ADMIN — METHYLERGONOVINE MALEATE 200 MCG: 0.2 TABLET ORAL at 06:25

## 2020-08-05 RX ADMIN — BENZOCAINE AND LEVOMENTHOL: 200; 5 SPRAY TOPICAL at 00:05

## 2020-08-05 RX ADMIN — IBUPROFEN 800 MG: 800 TABLET, FILM COATED ORAL at 16:39

## 2020-08-05 RX ADMIN — IBUPROFEN 800 MG: 800 TABLET, FILM COATED ORAL at 08:31

## 2020-08-05 RX ADMIN — IBUPROFEN 800 MG: 800 TABLET, FILM COATED ORAL at 00:02

## 2020-08-05 RX ADMIN — DOCUSATE SODIUM 100 MG: 100 CAPSULE, LIQUID FILLED ORAL at 08:31

## 2020-08-05 ASSESSMENT — PAIN SCALES - GENERAL
PAINLEVEL_OUTOF10: 1
PAINLEVEL_OUTOF10: 3
PAINLEVEL_OUTOF10: 1

## 2020-08-05 NOTE — PROGRESS NOTES
RN helps patient with latch and multiple breastfeeding positions. Infant not interested at the breast at this time. RN assists patient with hand expression and a few drops of breast milk is given to infant via spoon.

## 2020-08-05 NOTE — PLAN OF CARE
Problem: VAGINAL DELIVERY - RECOVERY AND POST PARTUM  Goal: Vital signs are medically acceptable  8/4/2020 2314 by Alem Ortiz RN  Outcome: Met This Shift  8/4/2020 1510 by Maricruz Mahmood RN  Outcome: Ongoing  Goal: Patient will remain free of falls  8/4/2020 2314 by Alem Ortiz RN  Outcome: Met This Shift  8/4/2020 1510 by Maricruz Mahmood RN  Outcome: Ongoing  Goal: Fundus firm at midline  8/4/2020 2314 by Alem Ortiz RN  Outcome: Met This Shift  8/4/2020 1510 by Maricruz Mahmood RN  Outcome: Ongoing  Goal: Moderate rubra without clots, no purulent discharge, no foul smelling lochia  8/4/2020 2314 by Alem Ortiz RN  Outcome: Met This Shift  8/4/2020 1510 by Maricruz Mahmood RN  Outcome: Ongoing  Goal: Empties bladder  8/4/2020 2314 by Alem Ortiz RN  Outcome: Met This Shift  8/4/2020 1510 by Maricruz Mahmood RN  Outcome: Ongoing  Goal: Demonstrates appropriate breast feeding techniques  8/4/2020 2314 by Alem Ortiz RN  Outcome: Met This Shift  8/4/2020 1510 by Maricruz Mahmood RN  Outcome: Ongoing  Goal: Positive Mother-Baby interactions are observed  8/4/2020 2314 by Alem Ortiz RN  Outcome: Met This Shift  8/4/2020 1510 by Maricruz Mahmood RN  Outcome: Ongoing  Goal: Ambulates independently  8/4/2020 2314 by Alem Ortiz RN  Outcome: Met This Shift  8/4/2020 1510 by Maricruz Mahmood RN  Outcome: Ongoing     Problem: PAIN  Goal: Patient's pain/discomfort is manageable  8/4/2020 2314 by Alem Ortiz RN  Outcome: Met This Shift  8/4/2020 1510 by Maricruz Mahmood RN  Outcome: Ongoing     Problem: KNOWLEDGE DEFICIT  Goal: Patient/S.O. demonstrates understanding of disease process, treatment plan, medications, and discharge instructions.   8/4/2020 2314 by Alem Ortiz RN  Outcome: Met This Shift  8/4/2020 1510 by Maricruz Mahmood RN  Outcome: Ongoing     Problem: VAGINAL DELIVERY - RECOVERY AND POST PARTUM  Goal: Verbalizes understanding of normal bowel function resumption  8/4/2020 1510 by Melody Easley RN  Outcome: Ongoing  Goal: Edema will be absent or minimal  8/4/2020 1510 by Melody Easley RN  Outcome: Ongoing  Goal: Breasts are soft with nipple integrity intact  8/4/2020 1510 by Melody Easley RN  Outcome: Ongoing  Goal: Appropriate behavior observed  8/4/2020 1510 by Melody Easley RN  Outcome: Ongoing

## 2020-08-05 NOTE — PROGRESS NOTES
RN and patient discuss infant weight loss percentage. Patient states, \" I want to breastfeed still but I have only gotten one hour of sleep and I'm tired. When should I give formula? \"    RN discusses feeding method of infant is parent choice. RN states formula is available or patient may use breast pump to see what she is able to produce to give infant.      Patient states will attempt to breastfeed again in one hour then make decision at that time if infant is still not interested at the breast.

## 2020-08-05 NOTE — PLAN OF CARE
Problem: VAGINAL DELIVERY - RECOVERY AND POST PARTUM  Goal: Vital signs are medically acceptable  8/5/2020 1629 by Rashmi Vila RN  Outcome: Completed  8/5/2020 1629 by Rashmi Vila RN  Outcome: Ongoing  Goal: Patient will remain free of falls  8/5/2020 1629 by Rashmi Vila RN  Outcome: Completed  8/5/2020 1629 by Rashmi Vila RN  Outcome: Ongoing  Goal: Fundus firm at midline  8/5/2020 1629 by Rashmi Vila RN  Outcome: Completed  8/5/2020 1629 by Rashmi Vila RN  Outcome: Ongoing  Goal: Moderate rubra without clots, no purulent discharge, no foul smelling lochia  8/5/2020 1629 by Rashmi Vila RN  Outcome: Completed  8/5/2020 1629 by Rashmi Vila RN  Outcome: Ongoing  Goal: Empties bladder  8/5/2020 1629 by Rashmi Vila RN  Outcome: Completed  8/5/2020 1629 by Rashmi Vila RN  Outcome: Ongoing  Goal: Verbalizes understanding of normal bowel function resumption  8/5/2020 1629 by Rashmi Vila RN  Outcome: Completed  8/5/2020 1629 by Rashmi Vila RN  Outcome: Ongoing  Goal: Edema will be absent or minimal  8/5/2020 1629 by Rashmi Vila RN  Outcome: Completed  8/5/2020 1629 by Rashmi Vila RN  Outcome: Ongoing  Goal: Breasts are soft with nipple integrity intact  8/5/2020 1629 by Rashmi Vila, PAT  Outcome: Completed  8/5/2020 1629 by Rashmi Vila RN  Outcome: Ongoing  Goal: Demonstrates appropriate breast feeding techniques  8/5/2020 1629 by Rashmi Vila RN  Outcome: Completed  8/5/2020 1629 by Rashmi Vila RN  Outcome: Ongoing  Goal: Appropriate behavior observed  8/5/2020 1629 by Rashmi Vila RN  Outcome: Completed  8/5/2020 1629 by Rashmi Vila RN  Outcome: Ongoing  Goal: Positive Mother-Baby interactions are observed  8/5/2020 1629 by Rashmi Vila RN  Outcome: Completed  8/5/2020 1629 by Rashmi Vila RN  Outcome: Ongoing  Goal: Perineum intact without discharge or hematoma  8/5/2020 1629 by Rashmi Cadence, RN  Outcome: Completed  8/5/2020 1629 by Stephen Nunez RN  Outcome: Ongoing  Goal: Ambulates independently  8/5/2020 1629 by Stephen Nunez RN  Outcome: Completed  8/5/2020 1629 by Stephen Nunez RN  Outcome: Ongoing     Problem: PAIN  Goal: Patient's pain/discomfort is manageable  8/5/2020 1629 by Stephen Nunez RN  Outcome: Completed  8/5/2020 1629 by Stephen Nunez RN  Outcome: Ongoing     Problem: KNOWLEDGE DEFICIT  Goal: Patient/S.O. demonstrates understanding of disease process, treatment plan, medications, and discharge instructions.   8/5/2020 1629 by Stephen Nunez RN  Outcome: Completed  8/5/2020 1629 by Stephen Nunez RN  Outcome: Ongoing

## 2020-08-05 NOTE — PROGRESS NOTES
RN at bedside to assist with infant latch. Good latch observed to left and right breast. Patient denies any tenderness to nipples.

## 2020-08-05 NOTE — DISCHARGE SUMMARY
excessive edema on discharge day.     Discharge Medication:    Maria Cfranco Nury   Home Medication Instructions VSM:[id number]    Printed on:08/05/20 0948   Medication Information                      Prenatal Vit-Fe Fumarate-FA (PRENATAL VITAMIN) 27-0.8 MG TABS  Take 1 tablet by mouth                    Admit date: 8/3/2020  6:15 PM    Discharge Date: 8/5/2020    Discharged to: home in stable condition        Plan:   Follow up with Turner Apple in 2 weeks

## 2020-08-17 ENCOUNTER — ROUTINE PRENATAL (OUTPATIENT)
Dept: OBGYN | Age: 25
End: 2020-08-17
Payer: COMMERCIAL

## 2020-08-17 VITALS
WEIGHT: 141.8 LBS | SYSTOLIC BLOOD PRESSURE: 120 MMHG | BODY MASS INDEX: 25.12 KG/M2 | HEIGHT: 63 IN | DIASTOLIC BLOOD PRESSURE: 74 MMHG

## 2020-08-17 PROCEDURE — 99024 POSTOP FOLLOW-UP VISIT: CPT | Performed by: ADVANCED PRACTICE MIDWIFE

## 2020-08-17 NOTE — PROGRESS NOTES
POSTPARTUM EXAM    Date of service: 2020    Kesha Rudolph  Is a 31 y.o.  female    PT's PCP is: Lia Mathias MD     : 1995     OB History    Para Term  AB Living   2 1 1 0 1 1   SAB TAB Ectopic Molar Multiple Live Births   0 0 0 0 0 1      # Outcome Date GA Lbr Abraham/2nd Weight Sex Delivery Anes PTL Lv   2 Term 20 40w5d 04:29 / 00:15 7 lb 8.4 oz (3.413 kg) F Vag-Spont None N AIMEE      Complications: Post-dates pregnancy   1 AB 2016                Social History     Tobacco Use   Smoking Status Never Smoker   Smokeless Tobacco Never Used         Social History     Substance and Sexual Activity   Alcohol Use No         Delivery date 2020    Type of delivery:  Spontaneous vaginal delivery        Infant gender: female    Infant name: [place]    Are you breast or bottle feeding? Breast    Have you been sexually active since delivery? No    Vital Signs: Blood pressure 120/74, height 5' 3\" (1.6 m), weight 141 lb 12.8 oz (64.3 kg), last menstrual period 10/30/2019, currently breastfeeding. Labs:    Blood Type and Rh: A POSITIVE          Allergies: Patient has no known allergies. Current Outpatient Medications:     Prenatal Vit-Fe Fumarate-FA (PRENATAL VITAMIN) 27-0.8 MG TABS, Take 1 tablet by mouth, Disp: , Rfl:     Last Yearly: 2020    Last pap: 2020    Last HPV: na    Chief Complaint   Patient presents with    Postpartum Care     Postpartum exam. Patient had vaginal delivery 2020. patient is breast and formula feeding. How many Hours of sleep do you get a night: 8    Do you have a normal appetite: yes    Any problems or pain: no    Do you feel like you coping well: yes    Is baby sleeping:yes    How is baby eating: good    How did first pediatrician visit go: good    EPPDS:1    No results found for this visit on 20.       Nurse: Joan HERNANDEZ    HPI:  PT presents for Post partum exam Follow up 2 weeks after delivery. Objective   No acute distress  Excellent communications  Well-nourished  Assessment and Plan          Diagnosis Orders   1. Postpartum care following vaginal delivery               I am having Evan Foote maintain her Prenatal Vitamin. Return in about 4 weeks (around 9/14/2020), or postpartum. There are no Patient Instructions on file for this visit. Over 50% of time spent on counseling and care coordination on: see assessment and plan,  She was also counseled on her preventative health maintenance recommendations and follow-up.         FF time: 15 min      Chrystal Roberts,8/17/2020 4:03 PM

## 2020-09-14 ENCOUNTER — ROUTINE PRENATAL (OUTPATIENT)
Dept: OBGYN | Age: 25
End: 2020-09-14
Payer: COMMERCIAL

## 2020-09-14 VITALS
SYSTOLIC BLOOD PRESSURE: 122 MMHG | DIASTOLIC BLOOD PRESSURE: 74 MMHG | HEIGHT: 63 IN | BODY MASS INDEX: 25.27 KG/M2 | WEIGHT: 142.6 LBS

## 2020-09-14 LAB — HGB, POC: 14.5

## 2020-09-14 PROCEDURE — 0503F POSTPARTUM CARE VISIT: CPT | Performed by: ADVANCED PRACTICE MIDWIFE

## 2020-09-14 PROCEDURE — 85018 HEMOGLOBIN: CPT | Performed by: ADVANCED PRACTICE MIDWIFE

## 2020-09-14 NOTE — PROGRESS NOTES
POSTPARTUM EXAM    Date of service: 2020    Annalisa Bassett  Is a 31 y.o.  female    PT's PCP is: Chloé Quintero MD     : 1995     OB History    Para Term  AB Living   2 1 1 0 1 1   SAB TAB Ectopic Molar Multiple Live Births   0 0 0 0 0 1      # Outcome Date GA Lbr Abraham/2nd Weight Sex Delivery Anes PTL Lv   2 Term 20 40w5d 04:29  00:15 7 lb 8.4 oz (3.413 kg) F Vag-Spont None N AIMEE      Complications: Post-dates pregnancy   1 AB 2016                Social History     Tobacco Use   Smoking Status Never Smoker   Smokeless Tobacco Never Used         Social History     Substance and Sexual Activity   Alcohol Use No         Delivery date 2020    Type of delivery:  Spontaneous vaginal delivery    Laceration:Yes,     Infant gender: female    Infant name: Demar Durant    Are you breast or bottle feeding? Breast    Have you been sexually active since delivery? No    Vital Signs: Blood pressure 122/74, height 5' 3\" (1.6 m), weight 142 lb 9.6 oz (64.7 kg), last menstrual period 10/30/2019, currently breastfeeding. Labs:    Blood Type and Rh: A POSITIVE          Allergies: Patient has no known allergies. Current Outpatient Medications:     Prenatal Vit-Fe Fumarate-FA (PRENATAL VITAMIN) 27-0.8 MG TABS, Take 1 tablet by mouth, Disp: , Rfl:     Last Yearly:      Last pap:     Last HPV: na    Chief Complaint   Patient presents with    Postpartum Care     Postpartum exam. Patient had vaginal delivery  2020. patient is b reast feeding. Patient is having issues with breastfeeding and has consulted lactaction consultant. Patient feeling sad and stressed at times as she will be returning to work in November. Last pap 2020 negative.          How many Hours of sleep do you get a night: 8-9    Do you have a normal appetite: yes    Any problems or pain: slight fepression and anxiety    Do you feel like you coping well: fair    Is baby sleeping:good    How is baby eating: fair    How did first pediatrician visit go: good    EPPDS:5    No results found for this visit on 09/14/20. Nurse: Joan HERNANDEZ    HPI:  PT presents for Post partum exam Follow up 6 weeks after delivery. Objective   No acute distress  Excellent communications  Well-nourished      GI: Abdomen soft, non-tender. BS normal. No masses,  No organomegaly           Extremities: normal strength, tone, and muscle mass      Pelvic Exam: GENITAL/URINARY:  External Genitalia:  General appearance; normal, Hair distribution; normal, Lesions absent, well healed  Urethral Meatus:  Size normal, Location normal, Lesions absent, Prolapse absent  Urethra: Fullness absent, Masses absent  Bladder:  Fullness absent, Masses absent, Tenderness absent, Cystocele absent  Vagina:  General appearance normal, Estrogen effect normal, Discharge absent, Lesions absent, Pelvic support normal    Anus/Perineum:  Lesions absent and Masses absent                                      Assessment and Plan          Diagnosis Orders   1. Postpartum care following vaginal delivery  POCT hemoglobin   2. Postpartum depression         considering mirena IUD, had chest pain w OCPs and was told by her provider at that [facility] not take hormones\" denies any clots; had two [place] in past and second \"took three office visits to get out\"       I am having Ricarda Mcardle maintain her Prenatal Vitamin. Return in about 3 months (around 12/14/2020) for yearly. There are no Patient Instructions on file for this visit. Over 50% of time spent on counseling and care coordination on: see assessment and plan,  She was also counseled on her preventative health maintenance recommendations and follow-up.         FF time: 15 min      Erica Roberts,9/14/2020 9:33 AM

## 2020-12-04 ENCOUNTER — HOSPITAL ENCOUNTER (OUTPATIENT)
Dept: LAB | Age: 25
Setting detail: SPECIMEN
Discharge: HOME | End: 2020-12-04
Payer: COMMERCIAL

## 2020-12-04 PROCEDURE — C9803 HOPD COVID-19 SPEC COLLECT: HCPCS

## 2020-12-04 PROCEDURE — U0003 INFECTIOUS AGENT DETECTION BY NUCLEIC ACID (DNA OR RNA); SEVERE ACUTE RESPIRATORY SYNDROME CORONAVIRUS 2 (SARS-COV-2) (CORONAVIRUS DISEASE [COVID-19]), AMPLIFIED PROBE TECHNIQUE, MAKING USE OF HIGH THROUGHPUT TECHNOLOGIES AS DESCRIBED BY CMS-2020-01-R: HCPCS

## 2020-12-06 LAB — SARS-COV-2, NAA: NOT DETECTED

## 2020-12-09 ENCOUNTER — HOSPITAL ENCOUNTER (OUTPATIENT)
Dept: LAB | Age: 25
Setting detail: SPECIMEN
Discharge: HOME | End: 2020-12-09
Payer: COMMERCIAL

## 2020-12-09 PROCEDURE — C9803 HOPD COVID-19 SPEC COLLECT: HCPCS

## 2020-12-09 PROCEDURE — U0003 INFECTIOUS AGENT DETECTION BY NUCLEIC ACID (DNA OR RNA); SEVERE ACUTE RESPIRATORY SYNDROME CORONAVIRUS 2 (SARS-COV-2) (CORONAVIRUS DISEASE [COVID-19]), AMPLIFIED PROBE TECHNIQUE, MAKING USE OF HIGH THROUGHPUT TECHNOLOGIES AS DESCRIBED BY CMS-2020-01-R: HCPCS

## 2020-12-11 LAB — SARS-COV-2, NAA: NOT DETECTED

## 2021-01-18 ENCOUNTER — TELEPHONE (OUTPATIENT)
Dept: OBGYN | Age: 26
End: 2021-01-18

## 2021-01-18 NOTE — TELEPHONE ENCOUNTER
Patient called and left message with concerns? Asking about having blood work? . I called and left message for patient to call me back to further discuss.

## 2021-02-11 ENCOUNTER — HOSPITAL ENCOUNTER (OUTPATIENT)
Age: 26
Discharge: HOME | End: 2021-02-11
Payer: COMMERCIAL

## 2021-02-11 ENCOUNTER — OFFICE VISIT (OUTPATIENT)
Dept: OBGYN | Age: 26
End: 2021-02-11
Payer: COMMERCIAL

## 2021-02-11 VITALS
BODY MASS INDEX: 26.93 KG/M2 | DIASTOLIC BLOOD PRESSURE: 72 MMHG | WEIGHT: 152 LBS | HEIGHT: 63 IN | SYSTOLIC BLOOD PRESSURE: 118 MMHG

## 2021-02-11 DIAGNOSIS — R53.83 FATIGUE, UNSPECIFIED TYPE: ICD-10-CM

## 2021-02-11 DIAGNOSIS — F32.A DEPRESSION, UNSPECIFIED DEPRESSION TYPE: Primary | ICD-10-CM

## 2021-02-11 DIAGNOSIS — Z01.419 ENCOUNTER FOR ANNUAL ROUTINE GYNECOLOGICAL EXAMINATION: Primary | ICD-10-CM

## 2021-02-11 DIAGNOSIS — F43.23 ADJUSTMENT DISORDER WITH MIXED ANXIETY AND DEPRESSED MOOD: ICD-10-CM

## 2021-02-11 LAB
ABSOLUTE EOS #: 0.08 K/UL (ref 0–0.44)
ABSOLUTE IMMATURE GRANULOCYTE: <0.03 K/UL (ref 0–0.3)
ABSOLUTE LYMPH #: 1.77 K/UL (ref 1.1–3.7)
ABSOLUTE MONO #: 0.53 K/UL (ref 0.1–1.2)
ALBUMIN SERPL-MCNC: 4.5 G/DL (ref 3.5–5.2)
ALBUMIN/GLOBULIN RATIO: 1.6 (ref 1–2.5)
ALP BLD-CCNC: 41 U/L (ref 35–104)
ALT SERPL-CCNC: 19 U/L (ref 5–33)
ANION GAP SERPL CALCULATED.3IONS-SCNC: 7 MMOL/L (ref 9–17)
AST SERPL-CCNC: 17 U/L
BASOPHILS # BLD: 1 % (ref 0–2)
BASOPHILS ABSOLUTE: 0.04 K/UL (ref 0–0.2)
BILIRUB SERPL-MCNC: 0.96 MG/DL (ref 0.3–1.2)
BUN BLDV-MCNC: 14 MG/DL (ref 6–20)
BUN/CREAT BLD: 21 (ref 9–20)
CALCIUM SERPL-MCNC: 9.7 MG/DL (ref 8.6–10.4)
CHLORIDE BLD-SCNC: 98 MMOL/L (ref 98–107)
CO2: 28 MMOL/L (ref 20–31)
CREAT SERPL-MCNC: 0.67 MG/DL (ref 0.5–0.9)
DIFFERENTIAL TYPE: NORMAL
EOSINOPHILS RELATIVE PERCENT: 2 % (ref 1–4)
GFR AFRICAN AMERICAN: >60 ML/MIN
GFR NON-AFRICAN AMERICAN: >60 ML/MIN
GFR SERPL CREATININE-BSD FRML MDRD: ABNORMAL ML/MIN/{1.73_M2}
GFR SERPL CREATININE-BSD FRML MDRD: ABNORMAL ML/MIN/{1.73_M2}
GLUCOSE BLD-MCNC: 86 MG/DL (ref 70–99)
HCT VFR BLD CALC: 42.7 % (ref 36.3–47.1)
HEMOGLOBIN: 14.1 G/DL (ref 11.9–15.1)
IMMATURE GRANULOCYTES: 0 %
LYMPHOCYTES # BLD: 36 % (ref 24–43)
MCH RBC QN AUTO: 31.8 PG (ref 25.2–33.5)
MCHC RBC AUTO-ENTMCNC: 33 G/DL (ref 28.4–34.8)
MCV RBC AUTO: 96.2 FL (ref 82.6–102.9)
MONOCYTES # BLD: 11 % (ref 3–12)
NRBC AUTOMATED: 0 PER 100 WBC
PDW BLD-RTO: 12.5 % (ref 11.8–14.4)
PLATELET # BLD: 211 K/UL (ref 138–453)
PLATELET ESTIMATE: NORMAL
PMV BLD AUTO: 9.9 FL (ref 8.1–13.5)
POTASSIUM SERPL-SCNC: 4.3 MMOL/L (ref 3.7–5.3)
PROLACTIN: 12.91 UG/L (ref 4.79–23.3)
RBC # BLD: 4.44 M/UL (ref 3.95–5.11)
RBC # BLD: NORMAL 10*6/UL
SEG NEUTROPHILS: 50 % (ref 36–65)
SEGMENTED NEUTROPHILS ABSOLUTE COUNT: 2.48 K/UL (ref 1.5–8.1)
SODIUM BLD-SCNC: 133 MMOL/L (ref 135–144)
THYROXINE, FREE: 1.21 NG/DL (ref 0.93–1.7)
TOTAL PROTEIN: 7.4 G/DL (ref 6.4–8.3)
TSH SERPL DL<=0.05 MIU/L-ACNC: 1.8 MIU/L (ref 0.3–5)
WBC # BLD: 4.9 K/UL (ref 3.5–11.3)
WBC # BLD: NORMAL 10*3/UL

## 2021-02-11 PROCEDURE — 84439 ASSAY OF FREE THYROXINE: CPT

## 2021-02-11 PROCEDURE — 80053 COMPREHEN METABOLIC PANEL: CPT

## 2021-02-11 PROCEDURE — 84146 ASSAY OF PROLACTIN: CPT

## 2021-02-11 PROCEDURE — 84443 ASSAY THYROID STIM HORMONE: CPT

## 2021-02-11 PROCEDURE — 36415 COLL VENOUS BLD VENIPUNCTURE: CPT

## 2021-02-11 PROCEDURE — 99395 PREV VISIT EST AGE 18-39: CPT | Performed by: ADVANCED PRACTICE MIDWIFE

## 2021-02-11 PROCEDURE — G8484 FLU IMMUNIZE NO ADMIN: HCPCS | Performed by: ADVANCED PRACTICE MIDWIFE

## 2021-02-11 PROCEDURE — 85025 COMPLETE CBC W/AUTO DIFF WBC: CPT

## 2021-02-11 RX ORDER — CHLORAL HYDRATE 500 MG
3000 CAPSULE ORAL 3 TIMES DAILY
Status: ON HOLD | COMMUNITY
End: 2022-10-13 | Stop reason: HOSPADM

## 2021-02-11 RX ORDER — MULTIVIT-MIN/FERROUS FUMARATE 9 MG/15 ML
LIQUID (ML) ORAL
Status: ON HOLD | COMMUNITY
End: 2022-10-13 | Stop reason: HOSPADM

## 2021-02-11 RX ORDER — SERTRALINE HYDROCHLORIDE 25 MG/1
25 TABLET, FILM COATED ORAL DAILY
Qty: 30 TABLET | Refills: 1 | Status: SHIPPED | OUTPATIENT
Start: 2021-02-11 | End: 2022-03-07 | Stop reason: CLARIF

## 2021-02-11 ASSESSMENT — ENCOUNTER SYMPTOMS
BACK PAIN: 0
SHORTNESS OF BREATH: 0
ABDOMINAL PAIN: 0

## 2021-02-11 ASSESSMENT — PATIENT HEALTH QUESTIONNAIRE - PHQ9
2. FEELING DOWN, DEPRESSED OR HOPELESS: 1
SUM OF ALL RESPONSES TO PHQ QUESTIONS 1-9: 2
SUM OF ALL RESPONSES TO PHQ QUESTIONS 1-9: 2

## 2021-02-11 NOTE — PROGRESS NOTES
YEARLY PHYSICAL    Date of service: 2021    Félix Gamez  Is a 31 y.o.   female    PT's PCP is: Reggie Munoz MD     : 1995                                             Subjective:       Patient's last menstrual period was 2021 (exact date). Are your menses regular: yes, pumped until last month but periods were regular throughout postpartum    OB History    Para Term  AB Living   2 1 1 0 1 1   SAB TAB Ectopic Molar Multiple Live Births   0 0 0 0 0 1      # Outcome Date GA Lbr Abraham/2nd Weight Sex Delivery Anes PTL Lv   2 Term 20 40w5d 04:29 / 00:15 7 lb 8.4 oz (3.413 kg) F Vag-Spont None N AIMEE      Complications: Post-dates pregnancy   1 AB 2016                Social History     Tobacco Use   Smoking Status Never Smoker   Smokeless Tobacco Never Used        Social History     Substance and Sexual Activity   Alcohol Use No       Family History   Problem Relation Age of Onset    Other Other         no family h/o stroke or ovarian cancer     Breast Cancer Maternal Aunt        Any family history of breast or ovarian cancer: Yes    Any family history of blood clots: No      Allergies: Patient has no known allergies.       Current Outpatient Medications:     Omega-3 Fatty Acids (FISH OIL) 1000 MG CAPS, Take 3,000 mg by mouth 3 times daily, Disp: , Rfl:     SUPER B COMPLEX/C PO, Take by mouth, Disp: , Rfl:     Cholecalciferol (VITAMIN D3) 30 MCG/15ML LIQD, Take by mouth, Disp: , Rfl:     Prenatal Vit-Fe Fumarate-FA (PRENATAL VITAMIN) 27-0.8 MG TABS, Take 1 tablet by mouth, Disp: , Rfl:     Social History     Substance and Sexual Activity   Sexual Activity Yes    Partners: Male    Birth control/protection: Condom       Any bleeding or pain with intercourse: No    Last Yearly:  2020    Last pap: 2020    Last HPV: na    Last Mammogram: na    Last Dexascan na    Last colorectal screen- No organomegaly           Extremities: normal strength, tone, and muscle mass   Breasts: Breast:normal appearance, no masses or tenderness   Pelvic Exam: GENITAL/URINARY:  External Genitalia:  General appearance; normal, Hair distribution; normal, Lesions absent  Urethral Meatus:  Size normal, Location normal, Lesions absent, Prolapse absent  Urethra: Fullness absent, Masses absent  Bladder:  Fullness absent, Masses absent, Tenderness absent, Cystocele absent  Vagina:  General appearance normal, Estrogen effect normal, Discharge absent, Lesions absent, Pelvic support normal  Cervix:  General appearance normal, Lesions absent, Discharge absent, Tenderness absent, Enlargement absent, Nodularity absent  Uterus:  Size normal, Tenderness absent  Adenexa: Masses absent, Tenderness absent  Anus/Perineum:  Lesions absent and Masses absent                                                              Assessment and Plan          Diagnosis Orders   1. Encounter for annual routine gynecological examination     2. Fatigue, unspecified type  Prolactin    TSH without Reflex    T4, Free    CBC Auto Differential    Comprehensive Metabolic Panel   3. Adjustment disorder with mixed anxiety and depressed mood         consider zoloft if labs normal, start with 25 and rto after one month for evaluation if so      I am having Sumeet Salgado maintain her Prenatal Vitamin, fish oil, SUPER B COMPLEX/C PO, and Vitamin D3. Return in about 1 year (around 2/11/2022) for yearly. She was also counseled on her preventative health maintenance recommendations and follow-up. There are no Patient Instructions on file for this visit.     Oc Roberts,2/11/2021 8:35 AM

## 2021-03-17 ENCOUNTER — OFFICE VISIT (OUTPATIENT)
Dept: OBGYN | Age: 26
End: 2021-03-17
Payer: COMMERCIAL

## 2021-03-17 ENCOUNTER — TELEPHONE (OUTPATIENT)
Dept: OBGYN | Age: 26
End: 2021-03-17

## 2021-03-17 VITALS
WEIGHT: 152 LBS | HEIGHT: 63 IN | DIASTOLIC BLOOD PRESSURE: 76 MMHG | SYSTOLIC BLOOD PRESSURE: 116 MMHG | BODY MASS INDEX: 26.93 KG/M2

## 2021-03-17 DIAGNOSIS — F32.A DEPRESSION, UNSPECIFIED DEPRESSION TYPE: Primary | ICD-10-CM

## 2021-03-17 PROCEDURE — 99213 OFFICE O/P EST LOW 20 MIN: CPT | Performed by: ADVANCED PRACTICE MIDWIFE

## 2021-03-17 PROCEDURE — G8419 CALC BMI OUT NRM PARAM NOF/U: HCPCS | Performed by: ADVANCED PRACTICE MIDWIFE

## 2021-03-17 PROCEDURE — G8427 DOCREV CUR MEDS BY ELIG CLIN: HCPCS | Performed by: ADVANCED PRACTICE MIDWIFE

## 2021-03-17 PROCEDURE — 1036F TOBACCO NON-USER: CPT | Performed by: ADVANCED PRACTICE MIDWIFE

## 2021-03-17 PROCEDURE — G8484 FLU IMMUNIZE NO ADMIN: HCPCS | Performed by: ADVANCED PRACTICE MIDWIFE

## 2021-03-17 NOTE — PROGRESS NOTES
PROBLEM VISIT     Date of service: 3/17/2021    Thalia Love  Is a 31 y.o.  female    PT's PCP is: Dar Monet MD     : 1995                                             Subjective:       Patient's last menstrual period was 2021 (exact date). OB History    Para Term  AB Living   2 1 1 0 1 1   SAB TAB Ectopic Molar Multiple Live Births   0 0 0 0 0 1      # Outcome Date GA Lbr Abraham/2nd Weight Sex Delivery Anes PTL Lv   2 Term 20 40w5d 04:29  00:15 7 lb 8.4 oz (3.413 kg) F Vag-Spont None N AIMEE      Complications: Post-dates pregnancy   1 AB 2016                Social History     Tobacco Use   Smoking Status Never Smoker   Smokeless Tobacco Never Used        Social History     Substance and Sexual Activity   Alcohol Use No       Social History     Substance and Sexual Activity   Sexual Activity Yes    Partners: Male    Birth control/protection: Condom       Allergies: Patient has no known allergies. Chief Complaint   Patient presents with    Anxiety     Medication follow up. Patient currently taking Zoloft 25 mg. QD, feels it has helped however would like increase dose. Last Yearly:  2020    Last pap: 2020    Last HPV: never    PE:  Vital Signs  Blood pressure 116/76, height 5' 3\" (1.6 m), weight 152 lb (68.9 kg), last menstrual period 2021, not currently breastfeeding. Estimated body mass index is 26.93 kg/m² as calculated from the following:    Height as of this encounter: 5' 3\" (1.6 m). Weight as of this encounter: 152 lb (68.9 kg). No data recorded      NURSE: Boom HERNANDEZ    HPI: here for f/u to depression and tx with zoloft, takes at night is doing well, although thinks \"could be better\" denies side effects      PT denies fever, chills, nausea and vomiting       Objective: No acute distress  Excellent communications  Well-nourished    Results reviewed today:    No results found for this visit on 21.

## 2021-03-23 NOTE — TELEPHONE ENCOUNTER
Called Dr. Pedro Wandy office , patient will have to sign record release and will then be faxed to [phone]. Patient aware.

## 2021-03-29 DIAGNOSIS — F32.A DEPRESSION, UNSPECIFIED DEPRESSION TYPE: ICD-10-CM

## 2021-09-15 ENCOUNTER — OFFICE VISIT (OUTPATIENT)
Dept: OBGYN | Age: 26
End: 2021-09-15
Payer: COMMERCIAL

## 2021-09-15 VITALS
DIASTOLIC BLOOD PRESSURE: 68 MMHG | SYSTOLIC BLOOD PRESSURE: 114 MMHG | HEIGHT: 63 IN | BODY MASS INDEX: 28.35 KG/M2 | WEIGHT: 160 LBS

## 2021-09-15 DIAGNOSIS — F32.A DEPRESSION, UNSPECIFIED DEPRESSION TYPE: ICD-10-CM

## 2021-09-15 DIAGNOSIS — Z12.4 SCREENING FOR MALIGNANT NEOPLASM OF CERVIX: Primary | ICD-10-CM

## 2021-09-15 PROCEDURE — 1036F TOBACCO NON-USER: CPT | Performed by: ADVANCED PRACTICE MIDWIFE

## 2021-09-15 PROCEDURE — G8427 DOCREV CUR MEDS BY ELIG CLIN: HCPCS | Performed by: ADVANCED PRACTICE MIDWIFE

## 2021-09-15 PROCEDURE — G8419 CALC BMI OUT NRM PARAM NOF/U: HCPCS | Performed by: ADVANCED PRACTICE MIDWIFE

## 2021-09-15 PROCEDURE — 99214 OFFICE O/P EST MOD 30 MIN: CPT | Performed by: ADVANCED PRACTICE MIDWIFE

## 2021-09-15 NOTE — PROGRESS NOTES
PROBLEM VISIT     Date of service: 9/15/2021    Kane Mayes  Is a 31 y.o.  female    PT's PCP is: Russ Bowles MD     : 1995                                             Subjective:       Patient's last menstrual period was 2021 (exact date). OB History    Para Term  AB Living   2 1 1 0 1 1   SAB TAB Ectopic Molar Multiple Live Births   0 0 0 0 0 1      # Outcome Date GA Lbr Abraham/2nd Weight Sex Delivery Anes PTL Lv   2 Term 20 40w5d 04:29  00:15 7 lb 8.4 oz (3.413 kg) F Vag-Spont None N AIMEE      Complications: Post-dates pregnancy   1 AB 2016                Social History     Tobacco Use   Smoking Status Never Smoker   Smokeless Tobacco Never Used        Social History     Substance and Sexual Activity   Alcohol Use No       Social History     Substance and Sexual Activity   Sexual Activity Yes    Partners: Male    Birth control/protection: Condom       Allergies: Patient has no known allergies. Chief Complaint   Patient presents with    Depression     Medication follow up, patient currently taking Zoloft 50 mg. patient doing well. Last Yearly:  2021    Last pap: 2020    Last HPV: never    Have you had a positive covid test: No    Have you had the covid immunization: No    PE:  Vital Signs  Blood pressure 114/68, height 5' 3\" (1.6 m), weight 160 lb (72.6 kg), last menstrual period 2021, not currently breastfeeding. Estimated body mass index is 28.34 kg/m² as calculated from the following:    Height as of this encounter: 5' 3\" (1.6 m). Weight as of this encounter: 160 lb (72.6 kg).     No data recorded      NURSE: Joan HERNANDEZ    HPI:  Patient needs pap as last was normal () but reports previous abnormals with previous provider/ results unavailable, desires another pregnancy and is doing well on zoloft although has gained weight on it      PT denies fever, chills, nausea and vomiting       Objective: No acute distress  Excellent communications  Well-nourished  Pelvic exam: normal external genitalia, vulva, vagina, cervix, uterus and adnexa, retroverted uterus. Results reviewed today:    No results found for this visit on 09/15/21. Assessment and Plan          Diagnosis Orders   1. Screening for malignant neoplasm of cervix  PAP SMEAR   2. Depression, unspecified depression type  sertraline (ZOLOFT) 50 MG tablet             I am having Kane Mayes start on sertraline. I am also having her maintain her Prenatal Vitamin, fish oil, SUPER B COMPLEX/C PO, Vitamin D3, sertraline, and sertraline. Return in about 1 year (around 9/15/2022) for yearly. There are no Patient Instructions on file for this visit. Over 50% of time spent on counseling and care coordination on: see assessment and plan,  She was also counseled on her preventative health maintenance recommendations and follow-up.         FF time: 30 min      Feliciano Lennon  8:54 AM

## 2021-09-21 ENCOUNTER — TELEPHONE (OUTPATIENT)
Dept: OBGYN | Age: 26
End: 2021-09-21

## 2021-09-21 LAB — GYNECOLOGY CYTOLOGY REPORT: NORMAL

## 2021-09-21 NOTE — TELEPHONE ENCOUNTER
Writer spoke with Pao Jeff at [address] [place] [address] regarding the records that were requested on 3/29/21. Per Pao Jeff, the request was received and that the records will be faxed in the next 7-10 days.

## 2021-10-08 ENCOUNTER — OFFICE VISIT (OUTPATIENT)
Dept: PRIMARY CARE CLINIC | Age: 26
End: 2021-10-08
Payer: COMMERCIAL

## 2021-10-08 VITALS
BODY MASS INDEX: 28.52 KG/M2 | RESPIRATION RATE: 16 BRPM | HEART RATE: 98 BPM | DIASTOLIC BLOOD PRESSURE: 80 MMHG | WEIGHT: 161 LBS | SYSTOLIC BLOOD PRESSURE: 122 MMHG | TEMPERATURE: 98.2 F

## 2021-10-08 DIAGNOSIS — J01.10 ACUTE FRONTAL SINUSITIS, RECURRENCE NOT SPECIFIED: Primary | ICD-10-CM

## 2021-10-08 PROCEDURE — 1036F TOBACCO NON-USER: CPT | Performed by: FAMILY MEDICINE

## 2021-10-08 PROCEDURE — G8419 CALC BMI OUT NRM PARAM NOF/U: HCPCS | Performed by: FAMILY MEDICINE

## 2021-10-08 PROCEDURE — G8427 DOCREV CUR MEDS BY ELIG CLIN: HCPCS | Performed by: FAMILY MEDICINE

## 2021-10-08 PROCEDURE — G8484 FLU IMMUNIZE NO ADMIN: HCPCS | Performed by: FAMILY MEDICINE

## 2021-10-08 PROCEDURE — 99213 OFFICE O/P EST LOW 20 MIN: CPT | Performed by: FAMILY MEDICINE

## 2021-10-08 RX ORDER — AZITHROMYCIN 250 MG/1
250 TABLET, FILM COATED ORAL SEE ADMIN INSTRUCTIONS
Qty: 6 TABLET | Refills: 0 | Status: SHIPPED | OUTPATIENT
Start: 2021-10-08 | End: 2021-10-13

## 2021-10-08 SDOH — ECONOMIC STABILITY: TRANSPORTATION INSECURITY
IN THE PAST 12 MONTHS, HAS THE LACK OF TRANSPORTATION KEPT YOU FROM MEDICAL APPOINTMENTS OR FROM GETTING MEDICATIONS?: NO

## 2021-10-08 SDOH — ECONOMIC STABILITY: TRANSPORTATION INSECURITY
IN THE PAST 12 MONTHS, HAS LACK OF TRANSPORTATION KEPT YOU FROM MEETINGS, WORK, OR FROM GETTING THINGS NEEDED FOR DAILY LIVING?: NO

## 2021-10-08 SDOH — ECONOMIC STABILITY: FOOD INSECURITY: WITHIN THE PAST 12 MONTHS, THE FOOD YOU BOUGHT JUST DIDN'T LAST AND YOU DIDN'T HAVE MONEY TO GET MORE.: NEVER TRUE

## 2021-10-08 SDOH — ECONOMIC STABILITY: FOOD INSECURITY: WITHIN THE PAST 12 MONTHS, YOU WORRIED THAT YOUR FOOD WOULD RUN OUT BEFORE YOU GOT MONEY TO BUY MORE.: NEVER TRUE

## 2021-10-08 ASSESSMENT — SOCIAL DETERMINANTS OF HEALTH (SDOH): HOW HARD IS IT FOR YOU TO PAY FOR THE VERY BASICS LIKE FOOD, HOUSING, MEDICAL CARE, AND HEATING?: NOT VERY HARD

## 2021-10-08 NOTE — PATIENT INSTRUCTIONS
SURVEY:    You may be receiving a survey from [employer] regarding your visit today. You may get this in the mail, through your MyChart, or in your email. Please complete the survey to enable us to provide the highest quality of care to you and your family. If you cannot score us a very good (5 Stars) on any question, please call the office to discuss how we could of made your experience exceptional.    Thank you!     Dr. Raysa Martin, LPN  Lynda Garcia RN   [address] [place]bol[place]    Phone: [phone]  Fax: [phone]    Office Hours:   Brooklynn Negron, [address] [facility], F: 8-5 Wednesday: 9-11

## 2021-10-08 NOTE — PROGRESS NOTES
has ear wax and Tympanic membrane: normal landmarks and mobility    LEFT ear: Canal: normal and Tympanic membrane: normal landmarks and mobility  NOSE:  boggy mucosa with purulent mucus drainage  NECK:  normal, supple, no lymphadenopathy  CVS:   Regular rate and rhythm, no murmur  PULM:  clear to ausculation bilaterally, without wheezing or rhonchi      Assessment:  1. Acute frontal sinusitis, recurrence not specified                Plan:  · Encouraged increased oral fluids  · May use OTC meds:    Tylenol 500 mg po q6 hrs PRN fever   Mucinex-DM po BID prn cough    Orders Placed This Encounter   Medications    azithromycin (ZITHROMAX) 250 MG tablet     Sig: Take 1 tablet by mouth See Admin Instructions for 5 days 500mg on day 1 followed by 250mg on days 2 - 5     Dispense:  6 tablet     Refill:  0     No orders of the defined types were placed in this encounter. Return if symptoms worsen or fail to improve.       Electronically signed by Yulia Botello MD on 10/8/2021 at 3:32 PM

## 2021-10-25 NOTE — PATIENT INSTRUCTIONS
January 3th (Friday 6:30-9:30) & 4th (Sat 9am-4pm)    February 5th, 12th, & 19th (Wednesdays 6:30-9:30)    March - 6th (Friday 6:30-9:30) & 7th (Sat 9am-4pm)    April - 6th, 13th and 20th (Mondays 6:30 PM to 9:30 PM)    May - 8th (Friday 6:30-9:30) & 9th (Sat 9am-4pm)    June -3rd, 10th, 17th, (Wednesdays from 6:30 PM to 9:30 PM)    July - 24th (Friday 6:30pm-9:30pm) & 25th (Sat 9am-4pm)    August -5th, 12th, 19th, (Wednesdays from 6:30 PM to 9:30 PM)    September - 11th (Friday 6:30-9:30) & 12th (Sat 9am-4pm)    October -7th, 14th, 21st, (Wednesdays from 6:30 PM to 9:30 PM)    November - 6th (Friday 6:30-9:30) & 4th (Sat 9am-4pm)    December - NO CLASSES      There is no charge for classes. Please bring a pillow to class. Bring a support person. Childbirth education instructors are PAT White RN and Pratibha Mcmanus RN    To sign up for classes  Call the Obstetrics Department at  [facility] at  [phone]          [employer] classes 2020    Classes are held in the [facility] Room 1  Class time 6:30pm-8:30pm    Thursday, January 9 at 6:30 PM    Monday, March 9 at 6:30 PM    Monday, April 27 at 6:30 PM    Thursday, June 25 at 6:30 PM    Thursday, August 27 at 6:30 PM    Thursday, November 12 at 6:30 PM     these classes are free    Classes are taught by Benjamin Loomis RN, BSN, Diana Harmon  Lactation consultant for [facility]    To sign up for classes  call the Obstetrics Department at  [facility] at  [phone]  Patient Education        Learning About Pregnancy  Your Care Instructions    Your health in the early weeks of your pregnancy is particularly important for your baby's health. Take good care of yourself. Anything you do that harms your body can also harm your baby. Make sure to go to all of your doctor appointments. Regular checkups will help keep you and your baby healthy.   How can you care for yourself at home?  Diet    · Eat a balanced diet. Make sure your diet includes plenty of beans, peas, and leafy green vegetables.     · Do not skip meals or go for many hours without eating. If you are nauseated, try to eat a small, healthy snack every 2 to 3 hours.     · Do not eat fish that has a high level of mercury, such as shark, swordfish, or mackerel. Do not eat more than one can of tuna each week.     · Drink plenty of fluids, enough so that your urine is light yellow or clear like water. If you have kidney, heart, or liver disease and have to limit fluids, talk with your doctor before you increase the amount of fluids you drink.     · Cut down on caffeine, such as coffee, tea, and cola.     · Do not drink alcohol, such as beer, wine, or hard liquor.     · Take a multivitamin that contains at least 400 micrograms (mcg) of folic acid to help prevent birth defects. Fortified cereal and whole wheat bread are good additional sources of folic acid.     · Increase the calcium in your diet. Try to drink a quart of skim milk each day. You may also take calcium supplements and choose foods such as cheese and yogurt.    Lifestyle    · Make sure you go to your follow-up appointments.     · Get plenty of rest. You may be unusually tired while you are pregnant.     · Get at least 30 minutes of exercise on most days of the week. Walking is a good choice. If you have not exercised in the past, start out slowly. Take several short walks each day.     · Do not smoke. If you need help quitting, talk to your doctor about stop-smoking programs. These can increase your chances of quitting for good.     · Do not touch cat feces or litter boxes. Also, wash your hands after you handle raw meat, and fully cook all meat before you eat it. Wear gloves when you work in the yard or garden, and wash your hands well when you are done.  Cat feces, raw or undercooked meat, and contaminated dirt can cause an infection that may harm your baby or lead to a miscarriage.     · Do not use saunas or hot tubs. Raising your body temperature may harm your baby.     · Avoid chemical fumes, paint fumes, or poisons.     · Do not use illegal drugs or alcohol. Medicines    · Review all of your medicines with your doctor. Some of your routine medicines may need to be changed to protect your baby.     · Use acetaminophen (Tylenol) to relieve minor problems, such as a mild headache or backache or a mild fever with cold symptoms. Do not use nonsteroidal anti-inflammatory drugs (NSAIDs), such as ibuprofen (Advil, Motrin) or naproxen (Aleve), unless your doctor says it is okay.     · Do not take two or more pain medicines at the same time unless the doctor told you to. Many pain medicines have acetaminophen, which is Tylenol. Too much acetaminophen (Tylenol) can be harmful.     · Take your medicines exactly as prescribed. Call your doctor if you think you are having a problem with your medicine.    To manage morning sickness    · If you feel sick when you first wake up, try eating a small snack (such as crackers) before you get out of bed. Allow some time to digest the snack, and then get out of bed slowly.     · Do not skip meals or go for long periods without eating. An empty stomach can make nausea worse.     · Eat small, frequent meals instead of three large meals each day.     · Drink plenty of fluids. Sports drinks, such as Gatorade or Powerade, are good choices.     · Eat foods that are high in protein but low in fat.     · If you are taking iron supplements, ask your doctor if they are necessary. Iron can make nausea worse.     · Avoid any smells, such as coffee, that make you feel sick.     · Get lots of rest. Morning sickness may be worse when you are tired. Follow-up care is a key part of your treatment and safety. Be sure to make and go to all appointments, and call your doctor if you are having problems.  It's also a good idea to know your test results and keep a list out of bed slowly. · Drink plenty of fluids, enough so that your urine is light yellow or clear like water. If you have kidney, heart, or liver disease and have to limit fluids, talk with your doctor before you increase the amount of fluids you drink. Some women find that peppermint tea helps with nausea. · Eat more protein, such as chicken, fish, lean meat, beans, nuts, and seeds. · Eat carbohydrate foods, such as potatoes, whole-grain cereals, rice, and pasta. · Avoid smells and foods that make you feel nauseated. Spicy or high-fat foods, citrus juice, milk, coffee, and tea with caffeine often make nausea worse. · Do not drink alcohol. · Do not smoke. Try not to be around others who smoke. If you need help quitting, talk to your doctor about stop-smoking programs and medicines. These can increase your chances of quitting for good. · If you are taking iron supplements, ask your doctor if they are necessary. Iron can make nausea worse. · Get lots of rest. Stress and fatigue can make your morning sickness worse. · Ask your doctor about taking prescription medicine, or over-the-counter products such as vitamin B6, doxylamine, or teresa, to relieve your symptoms. Your doctor can tell you the doses that are safe for you. · Take your prenatal vitamins at night on a full stomach. When should you call for help? Call 911 anytime you think you may need emergency care. For example, call if:    · You passed out (lost consciousness).    Call your doctor now or seek immediate medical care if:    · You are sick to your stomach or cannot drink fluids.     · You have symptoms of dehydration, such as:  ? Dry eyes and a dry mouth. ? Passing only a little urine. ? Feeling thirstier than usual.     · You are not able to keep down your medicine.     · You have pain in your belly or pelvis.    Watch closely for changes in your health, and be sure to contact your doctor if:    · You do not get better as expected.    Where can vegetables every day. · Choose whole-grain bread, cereal, and pasta. Good choices include whole wheat bread, whole wheat pasta, brown rice, and oatmeal.  · Get 4 or more servings of milk and milk products each day. Good choices include nonfat or low-fat milk, yogurt, and cheese. If you cannot eat milk products, you can get calcium from calcium-fortified products such as orange juice, soy milk, and tofu. Other non-milk sources of calcium include leafy green vegetables, such as broccoli, kale, mustard greens, turnip greens, bok kelley, and brussels sprouts. · If you eat meat, pick lower-fat types. Good choices include lean cuts of meat and chicken or turkey without the skin. · Do not eat shark, swordfish, michoacano mackerel, or tilefish. They have high levels of mercury, which is dangerous to your baby. You can eat up to 12 ounces a week of fish or shellfish that have low mercury levels. Good choices include shrimp, wild salmon, pollock, and catfish. Do not eat more than 6 ounces of tuna each week. · Heat lunch meats (such as turkey, ham, or bologna) to 165°F before you eat them. This reduces your risk of getting sick from a kind of bacteria that can be found in lunch meats. · Do not eat unpasteurized soft cheeses, such as brie, feta, fresh mozzarella, and blue cheese. They have a bacteria that could harm your baby. · Limit caffeine. If you drink coffee or tea, have no more than 1 cup a day. Caffeine is also found in miguelito. · Do not drink any alcohol. No amount of alcohol has been found to be safe during pregnancy. · Do not diet or try to lose weight. For example, do not follow a low-carbohydrate diet. If you are overweight at the start of your pregnancy, your doctor will work with you to manage your weight gain. · Tell your doctor about all vitamins and supplements you take. When should you call for help? Watch closely for changes in your health, and be sure to contact your doctor if you have any problems.   Where can you learn more? Go to https://[email or web address] and sign in to your Minded account. Enter Y785 in the Shoplocal box to learn more about \"Nutrition During Pregnancy: Care Instructions. \"     If you do not have an account, please click on the \"Sign Up Now\" link. Current as of: May 29, 2019  Content Version: 12.3  © 6973-9654 CLEAR. Care instructions adapted under license by [facility] [employer] [facility] ([facility]). If you have questions about a medical condition or this instruction, always ask your healthcare professional. [address] any warranty or liability for your use of this information. Patient Education        Pregnancy Precautions: Care Instructions  Your Care Instructions    There is no sure way to prevent labor before your due date ( labor) or to prevent most other pregnancy problems. But there are things you can do to increase your chances of a healthy pregnancy. Go to your appointments, follow your doctor's advice, and take good care of yourself. Eat well, and exercise (if your doctor agrees). And make sure to drink plenty of water. Follow-up care is a key part of your treatment and safety. Be sure to make and go to all appointments, and call your doctor if you are having problems. It's also a good idea to know your test results and keep a list of the medicines you take. How can you care for yourself at home? · Make sure you go to your prenatal appointments. At each visit, your doctor will check your blood pressure. Your doctor will also check to see if you have protein in your urine. High blood pressure and protein in urine are signs of preeclampsia. This condition can be dangerous for you and your baby. · Drink plenty of fluids, enough so that your urine is light yellow or clear like water. Dehydration can cause contractions.  If you have kidney, heart, or liver disease and have to limit fluids, talk with your doctor before you increase the amount of fluids you drink. · Tell your doctor right away if you notice any symptoms of an infection, such as:  ? Burning when you urinate. ? A foul-smelling discharge from your vagina. ? Vaginal itching. ? Unexplained fever. ? Unusual pain or soreness in your uterus or lower belly. · Eat a balanced diet. Include plenty of foods that are high in calcium and iron. ? Foods high in calcium include milk, cheese, yogurt, almonds, and broccoli. ? Foods high in iron include red meat, shellfish, poultry, eggs, beans, raisins, whole-grain bread, and leafy green vegetables. · Do not smoke. If you need help quitting, talk to your doctor about stop-smoking programs and medicines. These can increase your chances of quitting for good. · Do not drink alcohol or use illegal drugs. · Follow your doctor's directions about activity. Your doctor will let you know how much, if any, exercise you can do. · Ask your doctor if you can have sex. If you are at risk for early labor, your doctor may ask you to not have sex. · Take care to prevent falls. During pregnancy, your joints are loose, and your balance is off. Sports such as bicycling, skiing, or in-line skating can increase your risk of falling. And don't ride horses or motorcycles, dive, water ski, scuba dive, or parachute jump while you are pregnant. · Avoid getting very hot. Do not use saunas or hot tubs. Avoid staying out in the sun in hot weather for long periods. Take acetaminophen (Tylenol) to lower a high fever. · Do not take any over-the-counter or herbal medicines or supplements without talking to your doctor or pharmacist first.  When should you call for help? Call 911 anytime you think you may need emergency care.  For example, call if:    · You passed out (lost consciousness).     · You have a seizure.     · You have severe vaginal bleeding.     · You have severe pain in your belly or pelvis.     · You have had fluid gushing or leaking from your vagina and you know or think the umbilical cord is bulging into your vagina. If this happens, immediately get down on your knees so your rear end (buttocks) is higher than your head. This will decrease the pressure on the cord until help arrives.   [facility] your doctor now or seek immediate medical care if:    · You have signs of preeclampsia, such as:  ? Sudden swelling of your face, hands, or feet. ? New vision problems (such as dimness, blurring, or seeing spots). ? A severe headache.     · You have any vaginal bleeding.     · You have belly pain or cramping.     · You have a fever.     · You have had regular contractions (with or without pain) for an hour. This means that you have 8 or more within 1 hour or 4 or more in 20 minutes after you change your position and drink fluids.     · You have a sudden release of fluid from your vagina.     · You have low back pain or pelvic pressure that does not go away.     · You notice that your baby has stopped moving or is moving much less than normal.    Watch closely for changes in your health, and be sure to contact your doctor if you have any problems. Where can you learn more? Go to https://chpepiceweb.[email or web address] and sign in to your Shopear account. Enter [id number] in the 20:20 Mobile box to learn more about \"Pregnancy Precautions: Care Instructions. \"     If you do not have an account, please click on the \"Sign Up Now\" link. Current as of: May 29, 2019  Content Version: 12.3  © 8282-1045 Healthwise, Let's Talk. Care instructions adapted under license by [facility] [employer] [facility] ([facility]). If you have questions about a medical condition or this instruction, always ask your healthcare professional. [place] [address] any warranty or liability for your use of this information. Patient Education        Weeks 6 to 10 of Your Pregnancy: Care Instructions  Your Care Instructions    Congratulations on your pregnancy.  This is an exciting and important time for [email or web address] and sign in to your Lean Train account. Enter P091 in the Healthcentrixhire box to learn more about \"Learning About When to Call Your Doctor During Pregnancy (Up to 20 Weeks). \"     If you do not have an account, please click on the \"Sign Up Now\" link. Current as of: May 29, 2019  Content Version: 12.3  © 5461-9831 Healthwise, Incorporated. Care instructions adapted under license by [facility] ([facility]). If you have questions about a medical condition or this instruction, always ask your healthcare professional. [address] any warranty or liability for your use of this information. warm and dry/color normal detailed exam

## 2022-03-07 ENCOUNTER — INITIAL PRENATAL (OUTPATIENT)
Dept: OBGYN | Age: 27
End: 2022-03-07
Payer: COMMERCIAL

## 2022-03-07 ENCOUNTER — HOSPITAL ENCOUNTER (OUTPATIENT)
Age: 27
Setting detail: SPECIMEN
Discharge: HOME | End: 2022-03-07
Payer: COMMERCIAL

## 2022-03-07 ENCOUNTER — ANCILLARY PROCEDURE (OUTPATIENT)
Dept: OBGYN | Age: 27
End: 2022-03-07
Payer: COMMERCIAL

## 2022-03-07 VITALS — DIASTOLIC BLOOD PRESSURE: 64 MMHG | SYSTOLIC BLOOD PRESSURE: 122 MMHG | BODY MASS INDEX: 30.29 KG/M2 | WEIGHT: 171 LBS

## 2022-03-07 DIAGNOSIS — Z32.01 POSITIVE URINE PREGNANCY TEST: ICD-10-CM

## 2022-03-07 DIAGNOSIS — N91.2 AMENORRHEA: ICD-10-CM

## 2022-03-07 DIAGNOSIS — Z34.90 ENCOUNTER FOR SUPERVISION OF NORMAL PREGNANCY, ANTEPARTUM, UNSPECIFIED GRAVIDITY: ICD-10-CM

## 2022-03-07 DIAGNOSIS — Z12.4 SCREENING FOR MALIGNANT NEOPLASM OF CERVIX: ICD-10-CM

## 2022-03-07 DIAGNOSIS — Z34.90 ENCOUNTER FOR SUPERVISION OF NORMAL PREGNANCY, ANTEPARTUM, UNSPECIFIED GRAVIDITY: Primary | ICD-10-CM

## 2022-03-07 LAB
ABO/RH: NORMAL
AMPHETAMINE SCREEN URINE: NEGATIVE
ANTIBODY SCREEN: NEGATIVE
BARBITURATE SCREEN URINE: NEGATIVE
BENZODIAZEPINE SCREEN, URINE: NEGATIVE
BUPRENORPHINE URINE: NEGATIVE
CANNABINOID SCREEN URINE: NEGATIVE
COCAINE METABOLITE, URINE: NEGATIVE
CONTROL: PRESENT
HEPATITIS C ANTIBODY: NONREACTIVE
HIV AG/AB: NONREACTIVE
METHADONE SCREEN, URINE: NEGATIVE
METHAMPHETAMINE, URINE: NEGATIVE
OPIATES, URINE: NEGATIVE
OXYCODONE SCREEN URINE: NEGATIVE
PHENCYCLIDINE, URINE: NEGATIVE
PREGNANCY TEST URINE, POC: POSITIVE
PROPOXYPHENE, URINE: NEGATIVE
TRICYCLIC ANTIDEPRESSANTS, UR: NEGATIVE

## 2022-03-07 PROCEDURE — 86803 HEPATITIS C AB TEST: CPT

## 2022-03-07 PROCEDURE — 86900 BLOOD TYPING SEROLOGIC ABO: CPT

## 2022-03-07 PROCEDURE — 87591 N.GONORRHOEAE DNA AMP PROB: CPT

## 2022-03-07 PROCEDURE — 86780 TREPONEMA PALLIDUM: CPT

## 2022-03-07 PROCEDURE — 87086 URINE CULTURE/COLONY COUNT: CPT

## 2022-03-07 PROCEDURE — G8427 DOCREV CUR MEDS BY ELIG CLIN: HCPCS | Performed by: ADVANCED PRACTICE MIDWIFE

## 2022-03-07 PROCEDURE — 80306 DRUG TEST PRSMV INSTRMNT: CPT

## 2022-03-07 PROCEDURE — 87340 HEPATITIS B SURFACE AG IA: CPT

## 2022-03-07 PROCEDURE — 85025 COMPLETE CBC W/AUTO DIFF WBC: CPT

## 2022-03-07 PROCEDURE — 87491 CHLMYD TRACH DNA AMP PROBE: CPT

## 2022-03-07 PROCEDURE — 86850 RBC ANTIBODY SCREEN: CPT

## 2022-03-07 PROCEDURE — 86403 PARTICLE AGGLUT ANTBDY SCRN: CPT

## 2022-03-07 PROCEDURE — 86762 RUBELLA ANTIBODY: CPT

## 2022-03-07 PROCEDURE — 87389 HIV-1 AG W/HIV-1&-2 AB AG IA: CPT

## 2022-03-07 PROCEDURE — 76801 OB US < 14 WKS SINGLE FETUS: CPT | Performed by: OBSTETRICS & GYNECOLOGY

## 2022-03-07 PROCEDURE — 0500F INITIAL PRENATAL CARE VISIT: CPT | Performed by: ADVANCED PRACTICE MIDWIFE

## 2022-03-07 PROCEDURE — 36415 COLL VENOUS BLD VENIPUNCTURE: CPT | Performed by: ADVANCED PRACTICE MIDWIFE

## 2022-03-07 PROCEDURE — 86901 BLOOD TYPING SEROLOGIC RH(D): CPT

## 2022-03-07 PROCEDURE — G8419 CALC BMI OUT NRM PARAM NOF/U: HCPCS | Performed by: ADVANCED PRACTICE MIDWIFE

## 2022-03-07 NOTE — PROGRESS NOTES
New OB Visit    Date of service: 3/7/2022    Rochele Cabot  Is a 31 y.o.  female presenting for a New OB visit with Nurse. Name of Father of Petar Payan is Carmela Maldonado and is involved. Pt does work at [employer]. Pt is not Fertility pt. PT's PCP is: Latrell Rashid MD     : 1995                                             Subjective:       Patient's last menstrual period was 2022. OB History    Para Term  AB Living   3 1 1 0 1 1   SAB IAB Ectopic Molar Multiple Live Births   1 0 0 0 0 1      # Outcome Date GA Lbr Abraham/2nd Weight Sex Delivery Anes PTL Lv   3 Current            2 Term 20 40w5d 04:29 / 00:15 7 lb 8.4 oz (3.413 kg) F Vag-Spont None N AIMEE      Complications: Post-dates pregnancy   1 2016 6w0d    SAB   ND             Social History     Tobacco Use   Smoking Status Never Smoker   Smokeless Tobacco Never Used        Social History     Substance and Sexual Activity   Alcohol Use No        Allergies: Patient has no known allergies. Current Outpatient Medications:     Omega-3 Fatty Acids (FISH OIL) 1000 MG CAPS, Take 3,000 mg by mouth 3 times daily, Disp: , Rfl:     SUPER B COMPLEX/C PO, Take by mouth, Disp: , Rfl:     Cholecalciferol (VITAMIN D3) 30 MCG/15ML LIQD, Take by mouth, Disp: , Rfl:     Prenatal Vit-Fe Fumarate-FA (PRENATAL VITAMIN) 27-0.8 MG TABS, Take 1 tablet by mouth, Disp: , Rfl:       Vital Signs Last menstrual period 2022, not currently breastfeeding. No results found for this visit on 22. Pain: present - adequately treated                            Nausea: yes      Vomiting: yes        Breast enlargement or tenderness: yes    Frequency of urination:yes      Fatigue: no         Patient history reviewed. Educational materials given and genetic testing reviewed.       Breastfeeding handouts given and reviewed: Yes     If BMI over 35 was HgA1C drawn: N/A      If history of thyroid problem was TSH drawn: may be unusually tired while you are pregnant.     · Get at least 30 minutes of exercise on most days of the week. Walking is a good choice. If you have not exercised in the past, start out slowly. Take several short walks each day.     · Do not smoke. If you need help quitting, talk to your doctor about stop-smoking programs. These can increase your chances of quitting for good.     · Do not touch cat feces or litter boxes. Also, wash your hands after you handle raw meat, and fully cook all meat before you eat it. Wear gloves when you work in the yard or garden, and wash your hands well when you are done. Cat feces, raw or undercooked meat, and contaminated dirt can cause an infection that may harm your baby or lead to a miscarriage.     · Do not use saunas or hot tubs. Raising your body temperature may harm your baby.     · Avoid chemical fumes, paint fumes, or poisons.     · Do not use illegal drugs, marijuana, or alcohol. Medicines    · Review all of your medicines with your doctor. Some of your routine medicines may need to be changed to protect your baby.     · Use acetaminophen (Tylenol) to relieve minor problems, such as a mild headache or backache or a mild fever with cold symptoms. Do not use nonsteroidal anti-inflammatory drugs (NSAIDs), such as ibuprofen (Advil, Motrin) or naproxen (Aleve), unless your doctor says it is okay.     · Do not take two or more pain medicines at the same time unless the doctor told you to. Many pain medicines have acetaminophen, which is Tylenol. Too much acetaminophen (Tylenol) can be harmful.     · Take your medicines exactly as prescribed. Call your doctor if you think you are having a problem with your medicine. To manage morning sickness    · If you feel sick when you first wake up, try eating a small snack (such as crackers) before you get out of bed.  Allow some time to digest the snack, and then get out of bed slowly.     · Do not skip meals or go for long periods without eating. An empty stomach can make nausea worse.     · Eat small, frequent meals instead of three large meals each day.     · Drink plenty of fluids.     · Eat foods that are high in protein but low in fat.     · If you are taking iron supplements, ask your doctor if they are necessary. Iron can make nausea worse.     · Avoid any smells, such as coffee, that make you feel sick.     · Get lots of rest. Morning sickness may be worse when you are tired. Follow-up care is a key part of your treatment and safety. Be sure to make and go to all appointments, and call your doctor if you are having problems. It's also a good idea to know your test results and keep a list of the medicines you take. Where can you learn more? Go to https://[employer].Flipzu. org and sign in to your [employer] account. Enter O992 in the Tingz box to learn more about \"Learning About Pregnancy. \"     If you do not have an account, please click on the \"Sign Up Now\" link. Current as of: June 16, 2021               Content Version: 13.1  © 5159-9679 [employer]. Care instructions adapted under license by [facility] ([facility]). If you have questions about a medical condition or this instruction, always ask your healthcare professional. [address] any warranty or liability for your use of this information. Patient Education        Managing Morning Sickness: Care Instructions  Overview     Morning sickness can be the toughest part of early pregnancy. Some people feel mildly sick to their stomach, and others are running to the bathroom. The good news? Morning sickness usually gets better in the second trimester. It's likely that your hormones are to blame for morning sickness. But you can do things to feel better, like changing what you eat, avoiding certain foods and smells, and asking your doctor about medicines you can try.   Follow-up care is a key part of your treatment and safety. Be sure to make and go to all appointments, and call your doctor if you are having problems. It's also a good idea to know your test results and keep a list of the medicines you take. How can you care for yourself at home? · Keep food in your stomach, but not too much at once. Your nausea may be worse if your stomach is empty. Eat five or six small meals a day instead of three large meals. · For morning nausea, eat a small snack, such as a couple of crackers or dry biscuits, before rising. Allow a few minutes for your stomach to settle before you get out of bed slowly. · Drink plenty of fluids. If you have kidney, heart, or liver disease and have to limit fluids, talk with your doctor before you increase the amount of fluids you drink. Some women find that peppermint tea helps with nausea. · Eat more protein, such as chicken, fish, lean meat, beans, nuts, and seeds. · Eat carbohydrate foods, such as potatoes, whole-grain cereals, rice, and pasta. · Avoid smells and foods that make you feel nauseated. Spicy or high-fat foods, citrus juice, milk, coffee, and tea with caffeine often make nausea worse. · Do not drink alcohol. · Do not smoke. Try not to be around others who smoke. If you need help quitting, talk to your doctor about stop-smoking programs and medicines. These can increase your chances of quitting for good. · If you are taking iron supplements, ask your doctor if they are necessary. Iron can make nausea worse. · Get lots of rest. Stress and fatigue can make your morning sickness worse. · Ask your doctor about taking prescription medicine, or over-the-counter products such as vitamin B6, doxylamine, or teresa, to relieve your symptoms. Your doctor can tell you the doses that are safe for you. · Take your prenatal vitamins at night on a full stomach. When should you call for help? Call 911 anytime you think you may need emergency care.  For example, call if:    · You passed out (lost consciousness). Call your doctor now or seek immediate medical care if:    · You are sick to your stomach or cannot drink fluids.     · You have symptoms of dehydration, such as:  ? Dry eyes and a dry mouth. ? Passing only a little urine. ? Feeling thirstier than usual.     · You are not able to keep down your medicine.     · You have pain in your belly or pelvis. Watch closely for changes in your health, and be sure to contact your doctor if:    · You do not get better as expected. Where can you learn more? Go to https://chpepiceweb.Multistory Learning. org and sign in to your MIKA Audio account. Enter J211 in the From The Bench box to learn more about \"Managing Morning Sickness: Care Instructions. \"     If you do not have an account, please click on the \"Sign Up Now\" link. Current as of: June 16, 2021               Content Version: 13.1  © 2006-2021 [employer]. Care instructions adapted under license by [facility] [place] ([facility]). If you have questions about a medical condition or this instruction, always ask your healthcare professional. [address] any warranty or liability for your use of this information. Patient Education        Nutrition During Pregnancy: Care Instructions  Overview     Healthy eating when you are pregnant is important for you and your baby. It can help you feel well and have a successful pregnancy and delivery. During pregnancy your nutrition needs increase. Even if you have excellent eating habits, your doctor may recommend a multivitamin to make sure you get enough iron and folic acid. You may wonder how much weight you should gain. In general, if you were at a healthy weight before you became pregnant, then you should gain between 25 and 35 pounds. If you were overweight before pregnancy, then you'll likely be advised to gain 15 to 25 pounds. If you were underweight before pregnancy, then you'll probably be advised to gain 28 to 40 pounds.  Your doctor will work with you to set a weight goal that is right for you. Gaining a healthy amount of weight helps you have a healthy baby. Follow-up care is a key part of your treatment and safety. Be sure to make and go to all appointments, and call your doctor if you are having problems. It's also a good idea to know your test results and keep a list of the medicines you take. How can you care for yourself at home? · Eat plenty of fruits and vegetables. Include a variety of orange, yellow, and leafy dark-green vegetables every day. · Choose whole-grain bread, cereal, and pasta. Good choices include whole wheat bread, whole wheat pasta, brown rice, and oatmeal.  · Get 4 or more servings of milk and milk products each day. Good choices include nonfat or low-fat milk, yogurt, and cheese. If you cannot eat milk products, you can get calcium from calcium-fortified products such as orange juice, soy milk, and tofu. Other non-milk sources of calcium include leafy green vegetables, such as broccoli, kale, mustard greens, turnip greens, bok kelley, and brussels sprouts. · If you eat meat, pick lower-fat types. Good choices include lean cuts of meat and chicken or turkey without the skin. · Do not eat shark, swordfish, michoacano mackerel, or tilefish. They have high levels of mercury, which is dangerous to your baby. You can eat up to 12 ounces a week of fish or shellfish that have low mercury levels. Good choices include shrimp, wild salmon, pollock, and catfish. Limit some other types of fish, such as white (albacore) tuna, to 4 oz (0.1 kg) a week. · Heat lunch meats (such as turkey, ham, or bologna) to 165°F before you eat them. This reduces your risk of getting sick from a kind of bacteria that can be found in lunch meats. · Do not eat unpasteurized soft cheeses, such as brie, feta, fresh mozzarella, and blue cheese. They have a bacteria that could harm your baby. · Limit caffeine.  If you drink coffee or tea, have no more than 1 cup a day. Caffeine is also found in miguelito. · Do not drink any alcohol. No amount of alcohol has been found to be safe during pregnancy. · Do not diet or try to lose weight. For example, do not follow a low-carbohydrate diet. If you are overweight at the start of your pregnancy, your doctor will work with you to manage your weight gain. · Tell your doctor about all vitamins and supplements you take. When should you call for help? Watch closely for changes in your health, and be sure to contact your doctor if you have any problems. Where can you learn more? Go to https://Microblrpepiceweb.Zoomaal. org and sign in to your HAUL account. Enter Y785 in the Over[employer] box to learn more about \"Nutrition During Pregnancy: Care Instructions. \"     If you do not have an account, please click on the \"Sign Up Now\" link. Current as of: September 8, 2021               Content Version: 13.1  © 2006-2021 [employer]. Care instructions adapted under license by [facility] [place] ([facility]). If you have questions about a medical condition or this instruction, always ask your healthcare professional. [place] [address] any warranty or liability for your use of this information. Patient Education        Weeks 6 to 10 of Your Pregnancy: Care Instructions  Overview     During the first 6 to 10 weeks of your pregnancy, your body goes through many changes. Your baby grows very quickly, even though you can't feel it yet. You may start to feel different, both in your body and your emotions. Because each pregnancy is unique, there's no right way to feel. You may feel the healthiest you've ever been, or you might feel tired or sick to your stomach (\"morning sickness\"). These early weeks are a time to make healthy choices and to eat the best foods for you and your baby. This is also a good time to think about birth defects testing.  These are tests done during pregnancy to look for possible problems with the baby. First-trimester tests for birth defects can be done between 8 and 13 weeks of pregnancy, depending on the test. Talk with your doctor about what kinds of tests are available. Follow-up care is a key part of your treatment and safety. Be sure to make and go to all appointments, and call your doctor if you are having problems. It's also a good idea to know your test results and keep a list of the medicines you take. How can you care for yourself at home? Eat well  · Eat at least 3 meals and 2 healthy snacks every day. Eat fresh, whole foods, including:  ? 7 or more servings of bread, tortillas, cereal, rice, pasta, or oatmeal.  ? 3 or more servings of vegetables, especially leafy green vegetables. ? 2 or more servings of fruits. ? 3 or more servings of milk, yogurt, or cheese. ? 2 or more servings of meat, turkey, chicken, fish, eggs, or dried beans. · Drink plenty of fluids, especially water. Avoid sodas and other sweetened drinks. · Choose foods that have important vitamins for your baby, such as calcium, iron, and folate. ? Dairy products, tofu, canned fish with bones, almonds, broccoli, dark leafy greens, corn tortillas, and fortified orange juice are good sources of calcium. ? Beef, poultry, liver, spinach, lentils, dried beans, fortified cereals, and dried fruits are rich in iron. ? Dark leafy greens, broccoli, asparagus, liver, fortified cereals, orange juice, peanuts, and almonds are good sources of folate. · Avoid foods that could harm your baby. ? Do not eat raw or undercooked meat, chicken, or fish (such as sushi or raw oysters). ? Do not eat raw eggs or foods that contain raw eggs, such as Caesar dressing. ? Do not eat soft cheeses and unpasteurized dairy foods, such as Brie, feta, or blue cheese. ? Do not eat fish that contains a lot of mercury, such as shark, swordfish, tilefish, or michoacano mackerel.  Limit some other types of fish, such as white (albacore) tuna, to 4 oz (0.1 kg) a week. ? Do not eat raw sprouts, especially alfalfa sprouts. ? Cut down on caffeine, such as coffee, tea, and cola. Protect yourself and your baby  · Do not touch jaydon litter or cat feces. They can cause an infection that could harm your baby. · High body temperature can be harmful to your baby. So if you want to use a sauna or hot tub, be sure to talk to your doctor about how to use it safely. [place] with morning sickness  · Sip small amounts of water, juices, or shakes. Try drinking between meals, not with meals. · Eat 5 or 6 small meals a day. Try dry toast or crackers when you first get up, and eat breakfast a little later. · Avoid spicy, greasy, and fatty foods. · When you feel sick, open your windows or go for a short walk to get fresh air. · Try nausea wristbands. These help some people. · Tell your doctor if you think your prenatal vitamins make you sick. Where can you learn more? Go to https://DynispeAdvanced Imaging Technologies.VERTILAS. org and sign in to your Trusper account. Enter G112 in the LogicNets box to learn more about \"Weeks 6 to 10 of Your Pregnancy: Care Instructions. \"     If you do not have an account, please click on the \"Sign Up Now\" link. Current as of: 2021               Content Version: 13.1  © 8643-7611 [employer]. Care instructions adapted under license by [facility] ([facility]). If you have questions about a medical condition or this instruction, always ask your healthcare professional. [place] [address] any warranty or liability for your use of this information. Assessment:      Diagnosis Orders   1. Encounter for supervision of normal pregnancy, antepartum, unspecified      2. Positive urine pregnancy test     3.  Amenorrhea         Plan: Order Routine Prenatal Lab Yes     Order additional testing if requested         Order Prenatal Vitamins   No: otc             Appointment with Mark Pabon CNM in 4 weeks for Dating U/S and total body exam if indicated      Nurse:   Nitin Gonsalez LPN

## 2022-03-07 NOTE — PATIENT INSTRUCTIONS
Patient Education        Learning About Pregnancy  Your Care Instructions     Your health in the early weeks of your pregnancy is particularly important for your baby's health. Take good care of yourself. Anything you do that harms your body can also harm your baby. Make sure to go to all of your doctor appointments. Regular checkups will help keep you and your baby healthy. How can you care for yourself at home? Diet    · Eat a balanced diet. Make sure your diet includes plenty of beans, peas, and leafy green vegetables.     · Do not skip meals or go for many hours without eating. If you are nauseated, try to eat a small, healthy snack every 2 to 3 hours.     · Do not eat fish that has a high level of mercury, such as shark, swordfish, or mackerel. Do not eat more than one can of tuna each week.     · Drink plenty of fluids. If you have kidney, heart, or liver disease and have to limit fluids, talk with your doctor before you increase the amount of fluids you drink.     · Cut down on caffeine, such as coffee, tea, and cola.     · Do not drink alcohol, such as beer, wine, or hard liquor.     · Take a multivitamin that contains at least 400 micrograms (mcg) of folic acid to help prevent birth defects. Fortified cereal and whole wheat bread are good additional sources of folic acid.     · Increase the calcium in your diet. Try to drink a quart of skim milk each day. You may also take calcium supplements and choose foods such as cheese and yogurt. Lifestyle    · Make sure you go to your follow-up appointments.     · Get plenty of rest. You may be unusually tired while you are pregnant.     · Get at least 30 minutes of exercise on most days of the week. Walking is a good choice. If you have not exercised in the past, start out slowly. Take several short walks each day.     · Do not smoke. If you need help quitting, talk to your doctor about stop-smoking programs.  These can increase your chances of quitting for good.     · Do not touch cat feces or litter boxes. Also, wash your hands after you handle raw meat, and fully cook all meat before you eat it. Wear gloves when you work in the yard or garden, and wash your hands well when you are done. Cat feces, raw or undercooked meat, and contaminated dirt can cause an infection that may harm your baby or lead to a miscarriage.     · Do not use saunas or hot tubs. Raising your body temperature may harm your baby.     · Avoid chemical fumes, paint fumes, or poisons.     · Do not use illegal drugs, marijuana, or alcohol. Medicines    · Review all of your medicines with your doctor. Some of your routine medicines may need to be changed to protect your baby.     · Use acetaminophen (Tylenol) to relieve minor problems, such as a mild headache or backache or a mild fever with cold symptoms. Do not use nonsteroidal anti-inflammatory drugs (NSAIDs), such as ibuprofen (Advil, Motrin) or naproxen (Aleve), unless your doctor says it is okay.     · Do not take two or more pain medicines at the same time unless the doctor told you to. Many pain medicines have acetaminophen, which is Tylenol. Too much acetaminophen (Tylenol) can be harmful.     · Take your medicines exactly as prescribed. Call your doctor if you think you are having a problem with your medicine. To manage morning sickness    · If you feel sick when you first wake up, try eating a small snack (such as crackers) before you get out of bed. Allow some time to digest the snack, and then get out of bed slowly.     · Do not skip meals or go for long periods without eating. An empty stomach can make nausea worse.     · Eat small, frequent meals instead of three large meals each day.     · Drink plenty of fluids.     · Eat foods that are high in protein but low in fat.     · If you are taking iron supplements, ask your doctor if they are necessary.  Iron can make nausea worse.     · Avoid any smells, such as coffee, that make you feel sick.     · Get lots of rest. Morning sickness may be worse when you are tired. Follow-up care is a key part of your treatment and safety. Be sure to make and go to all appointments, and call your doctor if you are having problems. It's also a good idea to know your test results and keep a list of the medicines you take. Where can you learn more? Go to https://HealthEnginepeCypress Blind and Shutter.lifeIO. org and sign in to your [employer] account. Enter N812 in the [employer] box to learn more about \"Learning About Pregnancy. \"     If you do not have an account, please click on the \"Sign Up Now\" link. Current as of: June 16, 2021               Content Version: 13.1  © 2006-2021 Healthwise, Sensorin. Care instructions adapted under license by [facility] ([facility]). If you have questions about a medical condition or this instruction, always ask your healthcare professional. [place] [address] any warranty or liability for your use of this information. Patient Education        Managing Morning Sickness: Care Instructions  Overview     Morning sickness can be the toughest part of early pregnancy. Some people feel mildly sick to their stomach, and others are running to the bathroom. The good news? Morning sickness usually gets better in the second trimester. It's likely that your hormones are to blame for morning sickness. But you can do things to feel better, like changing what you eat, avoiding certain foods and smells, and asking your doctor about medicines you can try. Follow-up care is a key part of your treatment and safety. Be sure to make and go to all appointments, and call your doctor if you are having problems. It's also a good idea to know your test results and keep a list of the medicines you take. How can you care for yourself at home? · Keep food in your stomach, but not too much at once. Your nausea may be worse if your stomach is empty.  Eat five or six small meals a day instead of three large meals. · For morning nausea, eat a small snack, such as a couple of crackers or dry biscuits, before rising. Allow a few minutes for your stomach to settle before you get out of bed slowly. · Drink plenty of fluids. If you have kidney, heart, or liver disease and have to limit fluids, talk with your doctor before you increase the amount of fluids you drink. Some women find that peppermint tea helps with nausea. · Eat more protein, such as chicken, fish, lean meat, beans, nuts, and seeds. · Eat carbohydrate foods, such as potatoes, whole-grain cereals, rice, and pasta. · Avoid smells and foods that make you feel nauseated. Spicy or high-fat foods, citrus juice, milk, coffee, and tea with caffeine often make nausea worse. · Do not drink alcohol. · Do not smoke. Try not to be around others who smoke. If you need help quitting, talk to your doctor about stop-smoking programs and medicines. These can increase your chances of quitting for good. · If you are taking iron supplements, ask your doctor if they are necessary. Iron can make nausea worse. · Get lots of rest. Stress and fatigue can make your morning sickness worse. · Ask your doctor about taking prescription medicine, or over-the-counter products such as vitamin B6, doxylamine, or teresa, to relieve your symptoms. Your doctor can tell you the doses that are safe for you. · Take your prenatal vitamins at night on a full stomach. When should you call for help? Call 911 anytime you think you may need emergency care. For example, call if:    · You passed out (lost consciousness). Call your doctor now or seek immediate medical care if:    · You are sick to your stomach or cannot drink fluids.     · You have symptoms of dehydration, such as:  ? Dry eyes and a dry mouth. ? Passing only a little urine. ? Feeling thirstier than usual.     · You are not able to keep down your medicine.     · You have pain in your belly or pelvis.    Watch closely for changes in your health, and be sure to contact your doctor if:    · You do not get better as expected. Where can you learn more? Go to https://chpepiceweb.Trustpilot. org and sign in to your Xet[employer] account. Enter Z231 in the AlchemyAPI box to learn more about \"Managing Morning Sickness: Care Instructions. \"     If you do not have an account, please click on the \"Sign Up Now\" link. Current as of: June 16, 2021               Content Version: 13.1  © 5619-6785 Healthwise, LEYIO. Care instructions adapted under license by [facility] ([facility]). If you have questions about a medical condition or this instruction, always ask your healthcare professional. [address] any warranty or liability for your use of this information. Patient Education        Nutrition During Pregnancy: Care Instructions  Overview     Healthy eating when you are pregnant is important for you and your baby. It can help you feel well and have a successful pregnancy and delivery. During pregnancy your nutrition needs increase. Even if you have excellent eating habits, your doctor may recommend a multivitamin to make sure you get enough iron and folic acid. You may wonder how much weight you should gain. In general, if you were at a healthy weight before you became pregnant, then you should gain between 25 and 35 pounds. If you were overweight before pregnancy, then you'll likely be advised to gain 15 to 25 pounds. If you were underweight before pregnancy, then you'll probably be advised to gain 28 to 40 pounds. Your doctor will work with you to set a weight goal that is right for you. Gaining a healthy amount of weight helps you have a healthy baby. Follow-up care is a key part of your treatment and safety. Be sure to make and go to all appointments, and call your doctor if you are having problems.  It's also a good idea to know your test results and keep a list of the medicines you take.  How can you care for yourself at home? · Eat plenty of fruits and vegetables. Include a variety of orange, yellow, and leafy dark-green vegetables every day. · Choose whole-grain bread, cereal, and pasta. Good choices include whole wheat bread, whole wheat pasta, brown rice, and oatmeal.  · Get 4 or more servings of milk and milk products each day. Good choices include nonfat or low-fat milk, yogurt, and cheese. If you cannot eat milk products, you can get calcium from calcium-fortified products such as orange juice, soy milk, and tofu. Other non-milk sources of calcium include leafy green vegetables, such as broccoli, kale, mustard greens, turnip greens, bok kelley, and brussels sprouts. · If you eat meat, pick lower-fat types. Good choices include lean cuts of meat and chicken or turkey without the skin. · Do not eat shark, swordfish, michoacano mackerel, or tilefish. They have high levels of mercury, which is dangerous to your baby. You can eat up to 12 ounces a week of fish or shellfish that have low mercury levels. Good choices include shrimp, wild salmon, pollock, and catfish. Limit some other types of fish, such as white (albacore) tuna, to 4 oz (0.1 kg) a week. · Heat lunch meats (such as turkey, ham, or bologna) to 165°F before you eat them. This reduces your risk of getting sick from a kind of bacteria that can be found in lunch meats. · Do not eat unpasteurized soft cheeses, such as brie, feta, fresh mozzarella, and blue cheese. They have a bacteria that could harm your baby. · Limit caffeine. If you drink coffee or tea, have no more than 1 cup a day. Caffeine is also found in miguelito. · Do not drink any alcohol. No amount of alcohol has been found to be safe during pregnancy. · Do not diet or try to lose weight. For example, do not follow a low-carbohydrate diet. If you are overweight at the start of your pregnancy, your doctor will work with you to manage your weight gain.   · Tell your doctor about all vitamins and supplements you take. When should you call for help? Watch closely for changes in your health, and be sure to contact your doctor if you have any problems. Where can you learn more? Go to https://chollie.AltheRx Pharmaceuticals. org and sign in to your [employer] account. Enter Y785 in the [facility] box to learn more about \"Nutrition During Pregnancy: Care Instructions. \"     If you do not have an account, please click on the \"Sign Up Now\" link. Current as of: September 8, 2021               Content Version: 13.1  © 6732-1941 [employer]rive Met[employer]. Care instructions adapted under license by [facility] ([facility]). If you have questions about a medical condition or this instruction, always ask your healthcare professional. [address] any warranty or liability for your use of this information. Patient Education        Weeks 6 to 10 of Your Pregnancy: Care Instructions  Overview     During the first 6 to 10 weeks of your pregnancy, your body goes through many changes. Your baby grows very quickly, even though you can't feel it yet. You may start to feel different, both in your body and your emotions. Because each pregnancy is unique, there's no right way to feel. You may feel the healthiest you've ever been, or you might feel tired or sick to your stomach (\"morning sickness\"). These early weeks are a time to make healthy choices and to eat the best foods for you and your baby. This is also a good time to think about birth defects testing. These are tests done during pregnancy to look for possible problems with the baby. First-trimester tests for birth defects can be done between 8 and 13 weeks of pregnancy, depending on the test. Talk with your doctor about what kinds of tests are available. Follow-up care is a key part of your treatment and safety. Be sure to make and go to all appointments, and call your doctor if you are having problems.  It's also a good idea to know your test results and keep a list of the medicines you take. How can you care for yourself at home? Eat well  · Eat at least 3 meals and 2 healthy snacks every day. Eat fresh, whole foods, including:  ? 7 or more servings of bread, tortillas, cereal, rice, pasta, or oatmeal.  ? 3 or more servings of vegetables, especially leafy green vegetables. ? 2 or more servings of fruits. ? 3 or more servings of milk, yogurt, or cheese. ? 2 or more servings of meat, turkey, chicken, fish, eggs, or dried beans. · Drink plenty of fluids, especially water. Avoid sodas and other sweetened drinks. · Choose foods that have important vitamins for your baby, such as calcium, iron, and folate. ? Dairy products, tofu, canned fish with bones, almonds, broccoli, dark leafy greens, corn tortillas, and fortified orange juice are good sources of calcium. ? Beef, poultry, liver, spinach, lentils, dried beans, fortified cereals, and dried fruits are rich in iron. ? Dark leafy greens, broccoli, asparagus, liver, fortified cereals, orange juice, peanuts, and almonds are good sources of folate. · Avoid foods that could harm your baby. ? Do not eat raw or undercooked meat, chicken, or fish (such as sushi or raw oysters). ? Do not eat raw eggs or foods that contain raw eggs, such as Caesar dressing. ? Do not eat soft cheeses and unpasteurized dairy foods, such as Brie, feta, or blue cheese. ? Do not eat fish that contains a lot of mercury, such as shark, swordfish, tilefish, or michoacano mackerel. Limit some other types of fish, such as white (albacore) tuna, to 4 oz (0.1 kg) a week. ? Do not eat raw sprouts, especially alfalfa sprouts. ? Cut down on caffeine, such as coffee, tea, and cola. Protect yourself and your baby  · Do not touch jaydon litter or cat feces. They can cause an infection that could harm your baby. · High body temperature can be harmful to your baby.  So if you want to use a sauna or hot tub, be sure to talk to your doctor about how to use it safely. [place] with morning sickness  · Sip small amounts of water, juices, or shakes. Try drinking between meals, not with meals. · Eat 5 or 6 small meals a day. Try dry toast or crackers when you first get up, and eat breakfast a little later. · Avoid spicy, greasy, and fatty foods. · When you feel sick, open your windows or go for a short walk to get fresh air. · Try nausea wristbands. These help some people. · Tell your doctor if you think your prenatal vitamins make you sick. Where can you learn more? Go to https://SkyJampepiceweb.Mysafeplace. org and sign in to your tadoÂ° account. Enter G112 in the Miproto box to learn more about \"Weeks 6 to 10 of Your Pregnancy: Care Instructions. \"     If you do not have an account, please click on the \"Sign Up Now\" link. Current as of: June 16, 2021               Content Version: 13.1  © 6204-8607 Healthwise, Incorporated. Care instructions adapted under license by [facility]. If you have questions about a medical condition or this instruction, always ask your healthcare professional. [place] [address] any warranty or liability for your use of this information.

## 2022-03-08 LAB
ABSOLUTE EOS #: 0.1 K/UL (ref 0–0.44)
ABSOLUTE IMMATURE GRANULOCYTE: <0.03 K/UL (ref 0–0.3)
ABSOLUTE LYMPH #: 1.59 K/UL (ref 1.1–3.7)
ABSOLUTE MONO #: 0.59 K/UL (ref 0.1–1.2)
BASOPHILS # BLD: 0 % (ref 0–2)
BASOPHILS ABSOLUTE: <0.03 K/UL (ref 0–0.2)
C. TRACHOMATIS DNA ,URINE: NEGATIVE
EOSINOPHILS RELATIVE PERCENT: 1 % (ref 1–4)
HCT VFR BLD CALC: 39.6 % (ref 36.3–47.1)
HEMOGLOBIN: 13.3 G/DL (ref 11.9–15.1)
HEPATITIS B SURFACE ANTIGEN: NONREACTIVE
IMMATURE GRANULOCYTES: 0 %
LYMPHOCYTES # BLD: 21 % (ref 24–43)
MCH RBC QN AUTO: 31.4 PG (ref 25.2–33.5)
MCHC RBC AUTO-ENTMCNC: 33.6 G/DL (ref 28.4–34.8)
MCV RBC AUTO: 93.4 FL (ref 82.6–102.9)
MONOCYTES # BLD: 8 % (ref 3–12)
N. GONORRHOEAE DNA, URINE: NEGATIVE
NRBC AUTOMATED: 0 PER 100 WBC
PDW BLD-RTO: 13 % (ref 11.8–14.4)
PLATELET # BLD: 244 K/UL (ref 138–453)
PMV BLD AUTO: 10.8 FL (ref 8.1–13.5)
RBC # BLD: 4.24 M/UL (ref 3.95–5.11)
RUBV IGG SER QL: 152.2 IU/ML
SEG NEUTROPHILS: 70 % (ref 36–65)
SEGMENTED NEUTROPHILS ABSOLUTE COUNT: 5.13 K/UL (ref 1.5–8.1)
SPECIMEN DESCRIPTION: NORMAL
T. PALLIDUM, IGG: NONREACTIVE
WBC # BLD: 7.5 K/UL (ref 3.5–11.3)

## 2022-03-09 LAB
CULTURE: ABNORMAL
SPECIMEN DESCRIPTION: ABNORMAL

## 2022-03-10 DIAGNOSIS — N39.0 URINARY TRACT INFECTION WITHOUT HEMATURIA, SITE UNSPECIFIED: Primary | ICD-10-CM

## 2022-03-10 RX ORDER — AMOXICILLIN 500 MG/1
500 CAPSULE ORAL 3 TIMES DAILY
Qty: 21 CAPSULE | Refills: 0 | Status: SHIPPED | OUTPATIENT
Start: 2022-03-10 | End: 2022-03-17

## 2022-03-19 ENCOUNTER — HOSPITAL ENCOUNTER (EMERGENCY)
Age: 27
Discharge: HOME | End: 2022-03-19
Attending: EMERGENCY MEDICINE
Payer: COMMERCIAL

## 2022-03-19 VITALS
DIASTOLIC BLOOD PRESSURE: 95 MMHG | RESPIRATION RATE: 22 BRPM | TEMPERATURE: 98.8 F | OXYGEN SATURATION: 97 % | BODY MASS INDEX: 30.29 KG/M2 | HEART RATE: 90 BPM | SYSTOLIC BLOOD PRESSURE: 128 MMHG | WEIGHT: 171 LBS

## 2022-03-19 DIAGNOSIS — R10.84 GENERALIZED ABDOMINAL PAIN: Primary | ICD-10-CM

## 2022-03-19 PROCEDURE — 99283 EMERGENCY DEPT VISIT LOW MDM: CPT

## 2022-03-19 RX ORDER — ACETAMINOPHEN 500 MG
1000 TABLET ORAL EVERY 6 HOURS PRN
Status: ON HOLD | COMMUNITY
End: 2022-10-13 | Stop reason: HOSPADM

## 2022-03-20 NOTE — ED NOTES
Patient describes pain to cramping LLQ that is similar to her menstrual cramps.       Lan Valdez RN  03/19/22 [id number]

## 2022-03-20 NOTE — ED PROVIDER NOTES
[facility]      Pt Name: Sydney Castro  MRN: [id number]  [place] 1995  Date of evaluation: 3/19/2022  Provider: Zeke Pate MD    [address] [place] [address]       Chief Complaint   Patient presents with    Abdominal Cramping     lower abdomen, worse on left, onset at approx 1830         HISTORY OF PRESENT ILLNESS   (Location/Symptom, Timing/Onset, Context/Setting, Quality, Duration, Modifying Factors, Severity)  Note limiting factors. Sydney Castro is a 28 y.o. female who presents to the emergency department concern for abdominal 10 weeks pregnant. Denies any vaginal bleeding denies any chest pain denies any fevers or chills denies any urinary symptoms. HPI    Nursing Notes were reviewed. REVIEW OF SYSTEMS    (2-9 systems for level 4, 10 or more for level 5)     Review of Systems   All other systems reviewed and are negative. Except as noted above the remainder of the review of systems was reviewed and negative. PAST MEDICAL HISTORY     Past Medical History:   Diagnosis Date    Abnormal Pap smear of cervix     Anemia     Depression 9/15/2021    Endometriosis     Migraine          SURGICAL HISTORY       Past Surgical History:   Procedure Laterality Date    APPENDECTOMY      LAPAROSCOPY      x2    TONSILLECTOMY           CURRENT MEDICATIONS       Previous Medications    ACETAMINOPHEN (TYLENOL) 500 MG TABLET    Take 1,000 mg by mouth every 6 hours as needed for Pain    CHOLECALCIFEROL (VITAMIN D3) 30 MCG/15ML LIQD    Take by mouth    OMEGA-3 FATTY ACIDS (FISH OIL) 1000 MG CAPS    Take 3,000 mg by mouth 3 times daily    PRENATAL VIT-FE FUMARATE-FA (PRENATAL VITAMIN) 27-0.8 MG TABS    Take 1 tablet by mouth    SUPER B COMPLEX/C PO    Take by mouth       ALLERGIES     Patient has no known allergies.     FAMILY HISTORY       Family History   Problem Relation Age of Onset    Other Other         no family h/o stroke or ovarian cancer     Breast Cancer Maternal Aunt     No Known Problems Mother     No Known Problems Father     No Known Problems Brother     No Known Problems Maternal Grandmother     Cirrhosis Maternal Grandfather     Cancer Paternal Grandfather         Bone    No Known Problems Brother     No Known Problems Brother           SOCIAL HISTORY       Social History     Socioeconomic History    Marital status:      Spouse name: None    Number of children: None    Years of education: None    Highest education level: None   Occupational History    None   Tobacco Use    Smoking status: Never Smoker    Smokeless tobacco: Never Used   Vaping Use    Vaping Use: Never used   Substance and Sexual Activity    Alcohol use: No    Drug use: No    Sexual activity: Yes     Partners: Male     Birth control/protection: Condom   Other Topics Concern    None   Social History Narrative    None     Social Determinants of Health     Financial Resource Strain: Low Risk     Difficulty of Paying Living Expenses: Not very hard   Food Insecurity: No Food Insecurity    Worried About Running Out of Food in the Last Year: Never true    Mary of Food in the Last Year: Never true   Transportation Needs: No Transportation Needs    Lack of Transportation (Medical): No    Lack of Transportation (Non-Medical):  No   Physical Activity:     Days of Exercise per Week: Not on file    Minutes of Exercise per Session: Not on file   Stress:     Feeling of Stress : Not on file   Social Connections:     Frequency of Communication with Friends and Family: Not on file    Frequency of Social Gatherings with Friends and Family: Not on file    Attends Congregational Services: Not on file    Active Member of Clubs or Organizations: Not on file    Attends Club or Organization Meetings: Not on file    Marital Status: Not on file   Intimate Partner Violence:     Fear of Current or Ex-Partner: Not on file    Emotionally Abused: Not on file    Physically Abused: Not on file    Sexually Abused: Not on file   Housing Stability:     Unable to Pay for Housing in the Last Year: Not on file    Number of Places Lived in the Last Year: Not on file    Unstable Housing in the Last Year: Not on file       SCREENINGS         Teresa Coma Scale  Eye Opening: Spontaneous  Best Verbal Response: Oriented  Best Motor Response: Obeys commands  Sacred Heart Coma Scale Score: 15                     CIWA Assessment  BP: (!) 128/95  Pulse: 90                 PHYSICAL EXAM    (up to 7 for level 4, 8 or more for level 5)     ED Triage Vitals [03/19/22 2028]   BP Temp Temp Source Pulse Resp SpO2 Height Weight   (!) 128/95 98.8 °F (37.1 °C) Tympanic 90 22 97 % -- 171 lb (77.6 kg)       Physical Exam  Constitutional:       General: She is not in acute distress. Appearance: She is well-developed. She is not diaphoretic. HENT:      Head: Normocephalic and atraumatic. Eyes:      General:         Right eye: No discharge. Left eye: No discharge. Neck:      Trachea: No tracheal deviation. Cardiovascular:      Rate and Rhythm: Normal rate and regular rhythm. Heart sounds: No murmur heard. No friction rub. Pulmonary:      Effort: Pulmonary effort is normal. No respiratory distress. Breath sounds: No stridor. No wheezing or rales. Chest:      Chest wall: No tenderness. Abdominal:      General: There is no distension. Palpations: Abdomen is soft. There is no mass. Tenderness: There is no guarding or rebound. Comments: Limited bedside ultrasound demonstrates IUP with fetal heart rates in the 130s. Musculoskeletal:         General: No tenderness or deformity. Normal range of motion. Cervical back: Normal range of motion. Skin:     General: Skin is warm. Findings: No erythema or rash. Neurological:      Mental Status: She is alert and oriented to person, place, and time.    Psychiatric:         Behavior: Behavior normal.         DIAGNOSTIC RESULTS abdominal pain          DISPOSITION/PLAN   DISPOSITION Decision To Discharge 03/19/2022 08:41:52 PM      PATIENT REFERRED TO:  No follow-up provider specified. DISCHARGE MEDICATIONS:  New Prescriptions    No medications on file     Controlled Substances Monitoring:     No flowsheet data found.     (Please note that portions of this note were completed with a voice recognition program.  Efforts were made to edit the dictations but occasionally words are mis-transcribed.)    Marie Rodriguez MD (electronically signed)  Attending Emergency Physician            Marie Rodriguez MD  03/19/22 5540

## 2022-03-20 NOTE — ED NOTES
Dr. Blanche Mendez at bedside for bedside ultrasound. Patient denies any urinary symptoms.       Malinda Serrano RN  03/19/22 4925

## 2022-03-21 ENCOUNTER — TELEPHONE (OUTPATIENT)
Dept: PRIMARY CARE CLINIC | Age: 27
End: 2022-03-21

## 2022-03-21 NOTE — TELEPHONE ENCOUNTER
[facility] Transitions Initial Follow Up Call    Call within 2 business days of discharge: Yes     Patient: Michelle Ritchie Patient : 1995 MRN: <[id number]>    [unfilled]    RARS: No data recorded     Spoke with: [address], [address]    Discharge department/facility: [facility]    Non-face-to-face services provided:  Scheduled appointment with PCP-patient declined  Obtained and reviewed discharge summary and/or continuity of care documents     Patient reports she had sharp pains in left lower abdomen. Patient reports she is 10 weeks pregnancy. Patient stated the pain has subsided. Patient states she has appointment with OB on 2022 and will call to change if symptoms return. Reviewed when to call PCP or OB and when to return to ED for change in condition. Patient verbalizes understanding.     Follow Up  Future Appointments   Date Time Provider Jaiden Rodriguez   2022  8:10 AM GERMANIA Sparks CNM OB/GYN [facility]   2022  8:00 AM GERMANIA Sparks CNM OB/GYN [facility]       Cassy Goodwin RN

## 2022-04-04 ENCOUNTER — ROUTINE PRENATAL (OUTPATIENT)
Dept: OBGYN | Age: 27
End: 2022-04-04

## 2022-04-04 VITALS — SYSTOLIC BLOOD PRESSURE: 122 MMHG | BODY MASS INDEX: 30.29 KG/M2 | DIASTOLIC BLOOD PRESSURE: 66 MMHG | WEIGHT: 171 LBS

## 2022-04-04 DIAGNOSIS — Z34.81 ENCOUNTER FOR SUPERVISION OF OTHER NORMAL PREGNANCY IN FIRST TRIMESTER: Primary | ICD-10-CM

## 2022-04-04 DIAGNOSIS — Z3A.12 12 WEEKS GESTATION OF PREGNANCY: ICD-10-CM

## 2022-04-04 PROCEDURE — 0501F PRENATAL FLOW SHEET: CPT | Performed by: ADVANCED PRACTICE MIDWIFE

## 2022-04-04 NOTE — PROGRESS NOTES
Theoplis Maw is here at [address] for:    Chief Complaint   Patient presents with    Routine Prenatal Visit     TBE, last pap 09/15/2021 negative. Patient was atED 2022 with abdominal cramps , patient is feeling better, no bleeding. Estimated Due Date: Estimated Date of Delivery: 10/11/22    OB History    Para Term  AB Living   3 1 1 0 1 1   SAB IAB Ectopic Molar Multiple Live Births   1 0 0 0 0 1      # Outcome Date GA Lbr Abrahma/2nd Weight Sex Delivery Anes PTL Lv   3 Current            2 Term 20 40w5d 04:29 / 00:15 7 lb 8.4 oz (3.413 kg) F Vag-Spont None N AIMEE      Complications: Post-dates pregnancy   1 SAB 2016 6w0d    SAB   ND        Past Medical History:   Diagnosis Date    Abnormal Pap smear of cervix     Anemia     Depression 9/15/2021    Endometriosis     Migraine        Past Surgical History:   Procedure Laterality Date    APPENDECTOMY      LAPAROSCOPY      x2    TONSILLECTOMY         Social History     Tobacco Use   Smoking Status Never Smoker   Smokeless Tobacco Never Used        Social History     Substance and Sexual Activity   Alcohol Use No       No results found for this visit on 22. HPI: here for initial ob exam, no current c/o     PT denies fever, chills, nausea and vomiting       Vitals:  Estimated body mass index is 30.29 kg/m² as calculated from the following:    Height as of 9/15/21: 5' 3\" (1.6 m). Weight as of this encounter: 171 lb (77.6 kg). BP: 122/66  Weight: 171 lb (77.6 kg)  Patient Position: Sitting  Albumin: Negative  Glucose: Negative  Movement: Absent           Abdomen: round,soft, nontender  Total body exam completed please see prenatal physical exam tab in visit navigator       Results reviewed today:    No results found for this visit on 22. ASSESSMENT & Plan    Diagnosis Orders   1.  Encounter for supervision of other normal pregnancy in first trimester     2. 12 weeks gestation of pregnancy               I am having Kristyn Durant maintain her Prenatal Vitamin, fish oil, SUPER B COMPLEX/C PO, Vitamin D3, and acetaminophen. Return in about 4 weeks (around 5/2/2022) for ob. There are no Patient Instructions on file for this visit.           GERMANIA Camp - ROLAND,4/4/2022 8:41 AM

## 2022-05-02 ENCOUNTER — ROUTINE PRENATAL (OUTPATIENT)
Dept: OBGYN | Age: 27
End: 2022-05-02

## 2022-05-02 ENCOUNTER — HOSPITAL ENCOUNTER (OUTPATIENT)
Age: 27
Setting detail: SPECIMEN
Discharge: HOME | End: 2022-05-02
Payer: COMMERCIAL

## 2022-05-02 VITALS — BODY MASS INDEX: 30.72 KG/M2 | DIASTOLIC BLOOD PRESSURE: 78 MMHG | SYSTOLIC BLOOD PRESSURE: 120 MMHG | WEIGHT: 173.4 LBS

## 2022-05-02 DIAGNOSIS — N89.8 VAGINAL ODOR: ICD-10-CM

## 2022-05-02 DIAGNOSIS — Z34.82 ENCOUNTER FOR SUPERVISION OF OTHER NORMAL PREGNANCY IN SECOND TRIMESTER: Primary | ICD-10-CM

## 2022-05-02 DIAGNOSIS — Z3A.16 16 WEEKS GESTATION OF PREGNANCY: ICD-10-CM

## 2022-05-02 PROCEDURE — 87660 TRICHOMONAS VAGIN DIR PROBE: CPT

## 2022-05-02 PROCEDURE — 87510 GARDNER VAG DNA DIR PROBE: CPT

## 2022-05-02 PROCEDURE — 86403 PARTICLE AGGLUT ANTBDY SCRN: CPT

## 2022-05-02 PROCEDURE — 0502F SUBSEQUENT PRENATAL CARE: CPT | Performed by: ADVANCED PRACTICE MIDWIFE

## 2022-05-02 PROCEDURE — 87070 CULTURE OTHR SPECIMN AEROBIC: CPT

## 2022-05-02 PROCEDURE — 87480 CANDIDA DNA DIR PROBE: CPT

## 2022-05-02 NOTE — PROGRESS NOTES
her Prenatal Vitamin, fish oil, SUPER B COMPLEX/C PO, Vitamin D3, and acetaminophen. Return in about 4 weeks (around 5/30/2022) for ob 20wkus. There are no Patient Instructions on file for this visit.           GERMANIA Mathis CNM,5/2/2022 8:41 AM

## 2022-05-03 DIAGNOSIS — B37.31 YEAST VAGINITIS: Primary | ICD-10-CM

## 2022-05-03 LAB
CANDIDA SPECIES, DNA PROBE: POSITIVE
GARDNERELLA VAGINALIS, DNA PROBE: NEGATIVE
SOURCE: ABNORMAL
TRICHOMONAS VAGINALIS DNA: NEGATIVE

## 2022-05-07 LAB
CULTURE: ABNORMAL
SPECIMEN DESCRIPTION: ABNORMAL

## 2022-05-09 RX ORDER — AMOXICILLIN 500 MG/1
500 CAPSULE ORAL 3 TIMES DAILY
Qty: 21 CAPSULE | Refills: 0 | Status: SHIPPED | OUTPATIENT
Start: 2022-05-09 | End: 2022-05-16

## 2022-06-01 ENCOUNTER — ROUTINE PRENATAL (OUTPATIENT)
Dept: OBGYN | Age: 27
End: 2022-06-01

## 2022-06-01 VITALS — DIASTOLIC BLOOD PRESSURE: 74 MMHG | SYSTOLIC BLOOD PRESSURE: 122 MMHG | WEIGHT: 171.8 LBS | BODY MASS INDEX: 30.43 KG/M2

## 2022-06-01 DIAGNOSIS — Z3A.21 21 WEEKS GESTATION OF PREGNANCY: ICD-10-CM

## 2022-06-01 DIAGNOSIS — Z34.82 ENCOUNTER FOR SUPERVISION OF OTHER NORMAL PREGNANCY IN SECOND TRIMESTER: Primary | ICD-10-CM

## 2022-06-01 PROCEDURE — 0502F SUBSEQUENT PRENATAL CARE: CPT | Performed by: ADVANCED PRACTICE MIDWIFE

## 2022-06-01 NOTE — PROGRESS NOTES
Taras Alicea is here at 21w1d for:    Chief Complaint   Patient presents with    Routine Prenatal Visit     Follow up in house ultrasound. Estimated Due Date: Estimated Date of Delivery: 10/11/22    OB History    Para Term  AB Living   3 1 1 0 1 1   SAB IAB Ectopic Molar Multiple Live Births   1 0 0 0 0 1      # Outcome Date GA Lbr Abraham/2nd Weight Sex Delivery Anes PTL Lv   3 Current            2 Term 20 40w5d 04:29 / 00:15 7 lb 8.4 oz (3.413 kg) F Vag-Spont None N AIMEE      Complications: Post-dates pregnancy   1 SAB 2016 6w0d    SAB   ND        Past Medical History:   Diagnosis Date    Abnormal Pap smear of cervix     Anemia     Depression 9/15/2021    Endometriosis     Migraine        Past Surgical History:   Procedure Laterality Date    APPENDECTOMY      LAPAROSCOPY      x2    TONSILLECTOMY         Social History     Tobacco Use   Smoking Status Never Smoker   Smokeless Tobacco Never Used        Social History     Substance and Sexual Activity   Alcohol Use No       No results found for this visit on 22. HPI: here for routine ob visit, denies c/o     PT denies fever, chills, nausea and vomiting       Vitals:  Estimated body mass index is 30.43 kg/m² as calculated from the following:    Height as of 9/15/21: 5' 3\" (1.6 m). Weight as of this encounter: 171 lb 12.8 oz (77.9 kg). BP: 122/74  Weight: 171 lb 12.8 oz (77.9 kg)  Patient Position: Sitting  Albumin: Negative  Glucose: Negative  Movement: Present  Presentation: Vertex           Abdomen: round, soft, nontender    Results reviewed today:  Sono:    21.3 WK IUP  CL:4.6cm  HR:154bpm  Posterior placenta, cepahlic presentation  Ovaries: both seen, wnl  Gender: male   Active fetal movements  All visualized fetal anatomy WNL    Prominent renal pelvis bilaterally vipul- 4mm each  No results found for this visit on 22. ASSESSMENT & Plan    Diagnosis Orders   1.  Encounter for supervision of other normal pregnancy in second trimester     2. 21 weeks gestation of pregnancy               I am having Delilah Durant maintain her Prenatal Vitamin, fish oil, SUPER B COMPLEX/C PO, Vitamin D3, and acetaminophen. Return in about 4 weeks (around 6/29/2022) for ob. There are no Patient Instructions on file for this visit.           GERMANIA Escudero CNM,6/1/2022 9:13 AM

## 2022-06-29 ENCOUNTER — ROUTINE PRENATAL (OUTPATIENT)
Dept: OBGYN | Age: 27
End: 2022-06-29

## 2022-06-29 VITALS — BODY MASS INDEX: 30.47 KG/M2 | DIASTOLIC BLOOD PRESSURE: 72 MMHG | SYSTOLIC BLOOD PRESSURE: 118 MMHG | WEIGHT: 172 LBS

## 2022-06-29 DIAGNOSIS — Z3A.25 25 WEEKS GESTATION OF PREGNANCY: ICD-10-CM

## 2022-06-29 DIAGNOSIS — Z34.82 ENCOUNTER FOR SUPERVISION OF OTHER NORMAL PREGNANCY IN SECOND TRIMESTER: Primary | ICD-10-CM

## 2022-06-29 PROCEDURE — 0502F SUBSEQUENT PRENATAL CARE: CPT | Performed by: ADVANCED PRACTICE MIDWIFE

## 2022-06-29 NOTE — PROGRESS NOTES
Yogi Summers is here at 25w1d for:    Chief Complaint   Patient presents with    Routine Prenatal Visit     Instructions for 1 hour gtt. at 28 weeks. Estimated Due Date: Estimated Date of Delivery: 10/11/22    OB History    Para Term  AB Living   3 1 1 0 1 1   SAB IAB Ectopic Molar Multiple Live Births   1 0 0 0 0 1      # Outcome Date GA Lbr Abraham/2nd Weight Sex Delivery Anes PTL Lv   3 Current            2 Term 20 40w5d 04:29  00:15 7 lb 8.4 oz (3.413 kg) F Vag-Spont None N AIMEE      Complications: Post-dates pregnancy   1 2016 6w0d    SAB   ND        Past Medical History:   Diagnosis Date    Abnormal Pap smear of cervix     Anemia     Depression 9/15/2021    Endometriosis     Migraine        Past Surgical History:   Procedure Laterality Date    APPENDECTOMY      LAPAROSCOPY      x2    TONSILLECTOMY         Social History     Tobacco Use   Smoking Status Never Smoker   Smokeless Tobacco Never Used        Social History     Substance and Sexual Activity   Alcohol Use No       No results found for this visit on 22. HPI: here for routine ob visit, denies c/o     PT denies fever, chills, nausea and vomiting       Vitals:  Estimated body mass index is 30.47 kg/m² as calculated from the following:    Height as of 9/15/21: 5' 3\" (1.6 m). Weight as of this encounter: 172 lb (78 kg). BP: 118/72  Weight: 172 lb (78 kg)  Patient Position: Sitting  Albumin: Negative  Glucose: Negative  Movement: Present           Abdomen: round, soft, nontender      Results reviewed today:    No results found for this visit on 22. ASSESSMENT & Plan    Diagnosis Orders   1. Encounter for supervision of other normal pregnancy in second trimester     2. 25 weeks gestation of pregnancy               I am having Bernadette Stewart. Heater maintain her Prenatal Vitamin, fish oil, SUPER B COMPLEX/C PO, Vitamin D3, and acetaminophen.     Return in about 3 weeks (around 2022) for ob gtt.    There are no Patient Instructions on file for this visit.           Tori Crandall, [address] [facility] 10:03 AM

## 2022-07-19 ENCOUNTER — ROUTINE PRENATAL (OUTPATIENT)
Dept: OBGYN | Age: 27
End: 2022-07-19
Payer: COMMERCIAL

## 2022-07-19 VITALS — SYSTOLIC BLOOD PRESSURE: 114 MMHG | DIASTOLIC BLOOD PRESSURE: 68 MMHG | BODY MASS INDEX: 30.68 KG/M2 | WEIGHT: 173.2 LBS

## 2022-07-19 DIAGNOSIS — Z34.83 ENCOUNTER FOR SUPERVISION OF OTHER NORMAL PREGNANCY IN THIRD TRIMESTER: ICD-10-CM

## 2022-07-19 DIAGNOSIS — Z3A.28 28 WEEKS GESTATION OF PREGNANCY: Primary | ICD-10-CM

## 2022-07-19 LAB
CHP ED QC CHECK: NORMAL
GLUCOSE BLD-MCNC: 138 MG/DL
HGB, POC: 12.9

## 2022-07-19 PROCEDURE — 85018 HEMOGLOBIN: CPT | Performed by: ADVANCED PRACTICE MIDWIFE

## 2022-07-19 PROCEDURE — 82962 GLUCOSE BLOOD TEST: CPT | Performed by: ADVANCED PRACTICE MIDWIFE

## 2022-07-19 PROCEDURE — 0502F SUBSEQUENT PRENATAL CARE: CPT | Performed by: ADVANCED PRACTICE MIDWIFE

## 2022-07-19 NOTE — PROGRESS NOTES
Delores Liu is here at 28w0d for:    Chief Complaint   Patient presents with    Routine Prenatal Visit     1 hour gtt. Today. Estimated Due Date: Estimated Date of Delivery: 10/11/22    OB History    Para Term  AB Living   3 1 1 0 1 1   SAB IAB Ectopic Molar Multiple Live Births   1 0 0 0 0 1      # Outcome Date GA Lbr Abraham/2nd Weight Sex Delivery Anes PTL Lv   3 Current            2 Term 20 40w5d 04:29 / 00:15 7 lb 8.4 oz (3.413 kg) F Vag-Spont None N AIMEE      Complications: Post-dates pregnancy   1 2016 6w0d    SAB   ND        Past Medical History:   Diagnosis Date    Abnormal Pap smear of cervix     Anemia     Depression 9/15/2021    Endometriosis     Migraine        Past Surgical History:   Procedure Laterality Date    APPENDECTOMY      LAPAROSCOPY      x2    TONSILLECTOMY         Social History     Tobacco Use   Smoking Status Never   Smokeless Tobacco Never        Social History     Substance and Sexual Activity   Alcohol Use No       No results found for this visit on 22. HPI: here for routine ob visit and gtt, denies c/o     PT denies fever, chills, nausea and vomiting       Vitals:  Estimated body mass index is 30.68 kg/m² as calculated from the following:    Height as of 9/15/21: 5' 3\" (1.6 m). Weight as of this encounter: 173 lb 3.2 oz (78.6 kg). BP: 114/68  Weight: 173 lb 3.2 oz (78.6 kg)  Patient Position: Sitting  Albumin: Negative  Glucose: Negative  Movement: Present           Abdomen: round,soft, nontender    Results reviewed today:    No results found for this visit on 22. ASSESSMENT & Plan    Diagnosis Orders   1. 28 weeks gestation of pregnancy  POCT hemoglobin    POCT Glucose      2. Encounter for supervision of other normal pregnancy in third trimester                  I am having Suzanne Fields. Chinmayer maintain her Prenatal Vitamin, fish oil, SUPER B COMPLEX/C PO, Vitamin D3, and acetaminophen.     Return in about 2 weeks (around 8/2/2022) for ob 30wkUS+tdap. There are no Patient Instructions on file for this visit.           GERAMNIA Hawk CNM,7/19/2022 9:16 AM

## 2022-08-08 ENCOUNTER — ROUTINE PRENATAL (OUTPATIENT)
Dept: OBGYN | Age: 27
End: 2022-08-08
Payer: COMMERCIAL

## 2022-08-08 VITALS — BODY MASS INDEX: 30.15 KG/M2 | DIASTOLIC BLOOD PRESSURE: 74 MMHG | SYSTOLIC BLOOD PRESSURE: 112 MMHG | WEIGHT: 170.2 LBS

## 2022-08-08 DIAGNOSIS — Z34.83 PRENATAL CARE, SUBSEQUENT PREGNANCY, THIRD TRIMESTER: Primary | ICD-10-CM

## 2022-08-08 DIAGNOSIS — Z23 NEED FOR TDAP VACCINATION: ICD-10-CM

## 2022-08-08 DIAGNOSIS — Z3A.30 30 WEEKS GESTATION OF PREGNANCY: ICD-10-CM

## 2022-08-08 PROCEDURE — 90471 IMMUNIZATION ADMIN: CPT | Performed by: ADVANCED PRACTICE MIDWIFE

## 2022-08-08 PROCEDURE — 0502F SUBSEQUENT PRENATAL CARE: CPT | Performed by: ADVANCED PRACTICE MIDWIFE

## 2022-08-08 PROCEDURE — 90715 TDAP VACCINE 7 YRS/> IM: CPT | Performed by: ADVANCED PRACTICE MIDWIFE

## 2022-08-08 NOTE — PROGRESS NOTES
Park Dejesus is here at 30w6d for:    Chief Complaint   Patient presents with    Routine Prenatal Visit     Follow up in house ultrasound. C/O generalized back pain. TDAP given. Estimated Due Date: Estimated Date of Delivery: 10/11/22    OB History    Para Term  AB Living   3 1 1 0 1 1   SAB IAB Ectopic Molar Multiple Live Births   1 0 0 0 0 1      # Outcome Date GA Lbr Abraham/2nd Weight Sex Delivery Anes PTL Lv   3 Current            2 Term 20 40w5d 04:29 / 00:15 7 lb 8.4 oz (3.413 kg) F Vag-Spont None N AIMEE      Complications: Post-dates pregnancy   1 SAB 2016 6w0d    SAB   ND        Past Medical History:   Diagnosis Date    Abnormal Pap smear of cervix     Anemia     Depression 9/15/2021    Endometriosis     Migraine        Past Surgical History:   Procedure Laterality Date    APPENDECTOMY      LAPAROSCOPY      x2    TONSILLECTOMY         Social History     Tobacco Use   Smoking Status Never   Smokeless Tobacco Never        Social History     Substance and Sexual Activity   Alcohol Use No       No results found for this visit on 22. HPI: here for routine ob visit and growth sono WNL     PT denies fever, chills, nausea and vomiting       Vitals:  Estimated body mass index is 30.15 kg/m² as calculated from the following:    Height as of 9/15/21: 5' 3\" (1.6 m). Weight as of this encounter: 170 lb 3.2 oz (77.2 kg). BP: 112/74  Weight: 170 lb 3.2 oz (77.2 kg)  Patient Position: Sitting  Albumin: Negative  Glucose: Negative  Fundal Height (cm): 29 cm  Fetal HR: 140  Movement: Present  Presentation: Vertex           Abdomen: round,soft, nontender    Results reviewed today:  Sono:  31.4 WK IUP  EFW:48% (3lb 10oz)  CL:4.6cm  LEMUEL:15.7cm  HR:125bpm  posterior placenta, cephalic presentation  Active fetal movements   Prominent renal pelvis seen bilateral- both vipul 5mm  No results found for this visit on 22. ASSESSMENT & Plan    Diagnosis Orders   1.  Prenatal care, subsequent pregnancy, third trimester        2. Need for Tdap vaccination  Tdap, BOOSTRIX, (age 8 yrs+), IM      3. 30 weeks gestation of pregnancy                  I am having Kaity Veloz. Heater maintain her Prenatal Vitamin, fish oil, SUPER B COMPLEX/C PO, Vitamin D3, and acetaminophen. Return in about 2 weeks (around 8/22/2022) for ob. There are no Patient Instructions on file for this visit.           GERMANIA Dyson CNM,8/8/2022 10:45 PM

## 2022-08-22 ENCOUNTER — ROUTINE PRENATAL (OUTPATIENT)
Dept: OBGYN | Age: 27
End: 2022-08-22

## 2022-08-22 VITALS — SYSTOLIC BLOOD PRESSURE: 112 MMHG | BODY MASS INDEX: 30.68 KG/M2 | WEIGHT: 173.2 LBS | DIASTOLIC BLOOD PRESSURE: 68 MMHG

## 2022-08-22 DIAGNOSIS — Z3A.32 32 WEEKS GESTATION OF PREGNANCY: ICD-10-CM

## 2022-08-22 DIAGNOSIS — Z34.83 ENCOUNTER FOR SUPERVISION OF OTHER NORMAL PREGNANCY IN THIRD TRIMESTER: Primary | ICD-10-CM

## 2022-08-22 PROCEDURE — 0502F SUBSEQUENT PRENATAL CARE: CPT | Performed by: ADVANCED PRACTICE MIDWIFE

## 2022-08-25 ENCOUNTER — APPOINTMENT (OUTPATIENT)
Dept: ULTRASOUND IMAGING | Age: 27
End: 2022-08-25
Payer: COMMERCIAL

## 2022-08-25 ENCOUNTER — TELEPHONE (OUTPATIENT)
Dept: OBGYN | Age: 27
End: 2022-08-25

## 2022-08-25 ENCOUNTER — HOSPITAL ENCOUNTER (OUTPATIENT)
Age: 27
Discharge: HOME | End: 2022-08-25
Attending: ADVANCED PRACTICE MIDWIFE | Admitting: ADVANCED PRACTICE MIDWIFE
Payer: COMMERCIAL

## 2022-08-25 VITALS
SYSTOLIC BLOOD PRESSURE: 107 MMHG | HEART RATE: 93 BPM | TEMPERATURE: 100.4 F | BODY MASS INDEX: 30.65 KG/M2 | WEIGHT: 173 LBS | RESPIRATION RATE: 18 BRPM | HEIGHT: 63 IN | DIASTOLIC BLOOD PRESSURE: 63 MMHG

## 2022-08-25 PROCEDURE — 76815 OB US LIMITED FETUS(S): CPT

## 2022-08-25 PROCEDURE — 76819 FETAL BIOPHYS PROFIL W/O NST: CPT

## 2022-08-25 NOTE — PROGRESS NOTES
Mortimer Dawson is here at [address] [facility] for:    Chief Complaint   Patient presents with    Routine Prenatal Visit       Estimated Due Date: Estimated Date of Delivery: 10/11/22    OB History    Para Term  AB Living   3 1 1 0 1 1   SAB IAB Ectopic Molar Multiple Live Births   1 0 0 0 0 1      # Outcome Date GA Lbr Abraham/2nd Weight Sex Delivery Anes PTL Lv   3 Current            2 Term 20 40w5d 04:29 / 00:15 7 lb 8.4 oz (3.413 kg) F Vag-Spont None N AIMEE      Complications: Post-dates pregnancy   1 SAB 2016 6w0d    SAB   ND        Past Medical History:   Diagnosis Date    Abnormal Pap smear of cervix     Anemia     Depression 9/15/2021    Endometriosis     Migraine        Past Surgical History:   Procedure Laterality Date    APPENDECTOMY      LAPAROSCOPY      x2    TONSILLECTOMY         Social History     Tobacco Use   Smoking Status Never   Smokeless Tobacco Never        Social History     Substance and Sexual Activity   Alcohol Use No       No results found for this visit on 22. HPI: here for routine ob visit, denies c/o     PT denies fever, chills, nausea and vomiting       Vitals:  Estimated body mass index is 30.68 kg/m² as calculated from the following:    Height as of 9/15/21: 5' 3\" (1.6 m). Weight as of this encounter: 173 lb 3.2 oz (78.6 kg). BP: 112/68  Weight: 173 lb 3.2 oz (78.6 kg)  Patient Position: Sitting  Albumin: Negative  Glucose: Negative  Movement: Present           Abdomen: round,soft, nontender    Results reviewed today:    No results found for this visit on 22. ASSESSMENT & Plan    Diagnosis Orders   1. Encounter for supervision of other normal pregnancy in third trimester        2. 32 weeks gestation of pregnancy                  I am having Skeet Forth. Heater maintain her Prenatal Vitamin, fish oil, SUPER B COMPLEX/C PO, Vitamin D3, and acetaminophen. Return in about 2 weeks (around 2022) for ob.     There are no Patient Instructions on file for this visit.              GERMANIA Ricks CNM,8/25/2022 9:10 AM

## 2022-08-26 NOTE — FLOWSHEET NOTE
Discharge instructions reviewed with patient. All questions answered. Patient verbalizes understanding of pelvic rest and modified bedrest.  Patient knows to call office tomorrow to schedule an in office appointment for next week. Patient discharged ambulatory in no acute distress.

## 2022-08-26 NOTE — DISCHARGE INSTRUCTIONS
Patient is to be off work as of 8/25/2022 until seen in office next week. Activity at home - Modified bedrest with pelvic rest, nothing in vagina.

## 2022-08-29 ENCOUNTER — ROUTINE PRENATAL (OUTPATIENT)
Dept: OBGYN | Age: 27
End: 2022-08-29

## 2022-08-29 VITALS — WEIGHT: 172.6 LBS | SYSTOLIC BLOOD PRESSURE: 116 MMHG | DIASTOLIC BLOOD PRESSURE: 68 MMHG | BODY MASS INDEX: 30.57 KG/M2

## 2022-08-29 DIAGNOSIS — Z3A.33 33 WEEKS GESTATION OF PREGNANCY: ICD-10-CM

## 2022-08-29 DIAGNOSIS — O46.93 VAGINAL BLEEDING IN PREGNANCY, THIRD TRIMESTER: Primary | ICD-10-CM

## 2022-08-29 PROCEDURE — 0502F SUBSEQUENT PRENATAL CARE: CPT | Performed by: ADVANCED PRACTICE MIDWIFE

## 2022-08-29 NOTE — PROGRESS NOTES
Manuelito Gonzalez is here at [address] [facility] for:    Chief Complaint   Patient presents with    Routine Prenatal Visit       Estimated Due Date: Estimated Date of Delivery: 10/11/22    OB History    Para Term  AB Living   3 1 1 0 1 1   SAB IAB Ectopic Molar Multiple Live Births   1 0 0 0 0 1      # Outcome Date GA Lbr Abraham/2nd Weight Sex Delivery Anes PTL Lv   3 Current            2 Term 20 40w5d 04:29  00:15 7 lb 8.4 oz (3.413 kg) F Vag-Spont None N AIMEE      Complications: Post-dates pregnancy   1 SAB 2016 6w0d    SAB   ND        Past Medical History:   Diagnosis Date    Abnormal Pap smear of cervix     Anemia     Depression 9/15/2021    Endometriosis     Migraine        Past Surgical History:   Procedure Laterality Date    APPENDECTOMY      LAPAROSCOPY      x2    TONSILLECTOMY         Social History     Tobacco Use   Smoking Status Never   Smokeless Tobacco Never        Social History     Substance and Sexual Activity   Alcohol Use No       No results found for this visit on 22. HPI: here for routine ob visit     PT denies fever, chills, nausea and vomiting       Vitals:  Estimated body mass index is 30.57 kg/m² as calculated from the following:    Height as of 22: 5' 3\" (1.6 m). Weight as of this encounter: 172 lb 9.6 oz (78.3 kg). BP: 116/68  Weight: 172 lb 9.6 oz (78.3 kg)  Patient Position: Sitting  Albumin: Negative  Glucose: Negative  Movement: Present           Abdomen: soft, round  No tenderness    Results reviewed today:    No results found for this visit on 22. ASSESSMENT & Plan    Diagnosis Orders   1. Vaginal bleeding in pregnancy, third trimester        2. 33 weeks gestation of pregnancy                  I am having Delilah Durant maintain her Prenatal Vitamin, fish oil, SUPER B COMPLEX/C PO, Vitamin D3, and acetaminophen. Return in about 1 week (around 2022) for keep scheduled appointment.     There are no Patient Instructions on file for this visit.             Jm Mccurdy, [facility] 6:09 PM

## 2022-09-06 ENCOUNTER — ROUTINE PRENATAL (OUTPATIENT)
Dept: OBGYN | Age: 27
End: 2022-09-06

## 2022-09-06 VITALS — DIASTOLIC BLOOD PRESSURE: 66 MMHG | SYSTOLIC BLOOD PRESSURE: 122 MMHG | WEIGHT: 170 LBS | BODY MASS INDEX: 30.11 KG/M2

## 2022-09-06 DIAGNOSIS — Z34.83 ENCOUNTER FOR SUPERVISION OF OTHER NORMAL PREGNANCY IN THIRD TRIMESTER: Primary | ICD-10-CM

## 2022-09-06 DIAGNOSIS — Z3A.35 35 WEEKS GESTATION OF PREGNANCY: ICD-10-CM

## 2022-09-06 PROCEDURE — 0502F SUBSEQUENT PRENATAL CARE: CPT | Performed by: ADVANCED PRACTICE MIDWIFE

## 2022-09-07 NOTE — PROGRESS NOTES
Serg Talbert is here at 35w0d for:    Chief Complaint   Patient presents with    Routine Prenatal Visit       Estimated Due Date: Estimated Date of Delivery: 10/11/22    OB History    Para Term  AB Living   3 1 1 0 1 1   SAB IAB Ectopic Molar Multiple Live Births   1 0 0 0 0 1      # Outcome Date GA Lbr Abraham/2nd Weight Sex Delivery Anes PTL Lv   3 Current            2 Term 20 40w5d 04:29 / 00:15 7 lb 8.4 oz (3.413 kg) F Vag-Spont None N AIMEE      Complications: Post-dates pregnancy   1 SAB 2016 6w0d    SAB   ND        Past Medical History:   Diagnosis Date    Abnormal Pap smear of cervix     Anemia     Depression 9/15/2021    Endometriosis     Migraine        Past Surgical History:   Procedure Laterality Date    APPENDECTOMY      LAPAROSCOPY      x2    TONSILLECTOMY         Social History     Tobacco Use   Smoking Status Never   Smokeless Tobacco Never        Social History     Substance and Sexual Activity   Alcohol Use No       No results found for this visit on 22. HPI: here for routine ob visit, denies c/o     PT denies fever, chills, nausea and vomiting       Vitals:  Estimated body mass index is 30.11 kg/m² as calculated from the following:    Height as of 22: 5' 3\" (1.6 m). Weight as of this encounter: 170 lb (77.1 kg). BP: 122/66  Weight: 170 lb (77.1 kg)  Patient Position: Sitting  Albumin: Trace  Glucose: Negative  Movement: Present           Abdomen: round,soft, nontender    Results reviewed today:    No results found for this visit on 22. ASSESSMENT & Plan    Diagnosis Orders   1. Encounter for supervision of other normal pregnancy in third trimester        2. 35 weeks gestation of pregnancy                  I am having [employer]. Heater maintain her Prenatal Vitamin, fish oil, SUPER B COMPLEX/C PO, Vitamin D3, and acetaminophen. Return in about 1 week (around 2022) for ob no gbs.     There are no Patient Instructions on file for this visit.              GERMANIA Quinones - ROLAND,9/6/2022 9:11 PM

## 2022-09-14 ENCOUNTER — HOSPITAL ENCOUNTER (OUTPATIENT)
Age: 27
Setting detail: SPECIMEN
Discharge: HOME | End: 2022-09-14
Payer: COMMERCIAL

## 2022-09-14 ENCOUNTER — ROUTINE PRENATAL (OUTPATIENT)
Dept: OBGYN | Age: 27
End: 2022-09-14

## 2022-09-14 VITALS — DIASTOLIC BLOOD PRESSURE: 70 MMHG | BODY MASS INDEX: 30.43 KG/M2 | SYSTOLIC BLOOD PRESSURE: 118 MMHG | WEIGHT: 171.8 LBS

## 2022-09-14 DIAGNOSIS — Z34.83 ENCOUNTER FOR SUPERVISION OF OTHER NORMAL PREGNANCY IN THIRD TRIMESTER: Primary | ICD-10-CM

## 2022-09-14 DIAGNOSIS — Z3A.36 36 WEEKS GESTATION OF PREGNANCY: ICD-10-CM

## 2022-09-14 PROCEDURE — 87081 CULTURE SCREEN ONLY: CPT

## 2022-09-14 PROCEDURE — 0502F SUBSEQUENT PRENATAL CARE: CPT | Performed by: ADVANCED PRACTICE MIDWIFE

## 2022-09-14 PROCEDURE — 86403 PARTICLE AGGLUT ANTBDY SCRN: CPT

## 2022-09-14 NOTE — PROGRESS NOTES
Park Braun is here at 36w1d for:    Chief Complaint   Patient presents with    Routine Prenatal Visit     GBS today. Estimated Due Date: Estimated Date of Delivery: 10/11/22    OB History    Para Term  AB Living   3 1 1 0 1 1   SAB IAB Ectopic Molar Multiple Live Births   1 0 0 0 0 1      # Outcome Date GA Lbr Abraham/2nd Weight Sex Delivery Anes PTL Lv   3 Current            2 Term 20 40w5d 04:29 / 00:15 7 lb 8.4 oz (3.413 kg) F Vag-Spont None N AIMEE      Complications: Post-dates pregnancy   1 SAB 2016 6w0d    SAB   ND        Past Medical History:   Diagnosis Date    Abnormal Pap smear of cervix     Anemia     Depression 9/15/2021    Endometriosis     Migraine        Past Surgical History:   Procedure Laterality Date    APPENDECTOMY      LAPAROSCOPY      x2    TONSILLECTOMY         Social History     Tobacco Use   Smoking Status Never   Smokeless Tobacco Never        Social History     Substance and Sexual Activity   Alcohol Use No       No results found for this visit on 22. HPI: here for routine ob visit, denies c/o     PT denies fever, chills, nausea and vomiting       Vitals:  Estimated body mass index is 30.43 kg/m² as calculated from the following:    Height as of 22: 5' 3\" (1.6 m). Weight as of this encounter: 171 lb 12.8 oz (77.9 kg). BP: 118/70  Weight: 171 lb 12.8 oz (77.9 kg)  Patient Position: Sitting  Albumin: Trace  Glucose: Negative  Movement: Present           Abdomen: round,soft, nontender    Results reviewed today:    No results found for this visit on 22. ASSESSMENT & Plan    Diagnosis Orders   1. Encounter for supervision of other normal pregnancy in third trimester        2. 36 weeks gestation of pregnancy  Culture, Strep B Screen, Vaginal/Rectal                I am having Delilah Durant maintain her Prenatal Vitamin, fish oil, SUPER B COMPLEX/C PO, Vitamin D3, and acetaminophen.     Return in about 1 week (around 9/21/2022) for ob. There are no Patient Instructions on file for this visit.              GERMANIA Freeman - ROLAND,9/14/2022 5:23 PM

## 2022-09-15 LAB
CULTURE: ABNORMAL
SPECIMEN DESCRIPTION: ABNORMAL

## 2022-09-19 ENCOUNTER — ROUTINE PRENATAL (OUTPATIENT)
Dept: OBGYN | Age: 27
End: 2022-09-19

## 2022-09-19 VITALS — BODY MASS INDEX: 30.57 KG/M2 | WEIGHT: 172.6 LBS | DIASTOLIC BLOOD PRESSURE: 74 MMHG | SYSTOLIC BLOOD PRESSURE: 122 MMHG

## 2022-09-19 DIAGNOSIS — Z34.83 ENCOUNTER FOR SUPERVISION OF OTHER NORMAL PREGNANCY IN THIRD TRIMESTER: Primary | ICD-10-CM

## 2022-09-19 DIAGNOSIS — Z3A.36 36 WEEKS GESTATION OF PREGNANCY: ICD-10-CM

## 2022-09-19 PROCEDURE — 0502F SUBSEQUENT PRENATAL CARE: CPT | Performed by: ADVANCED PRACTICE MIDWIFE

## 2022-09-19 NOTE — PROGRESS NOTES
Mateo Park is here at 36w6d for:    Chief Complaint   Patient presents with    Routine Prenatal Visit     Discuss GBS results. Estimated Due Date: Estimated Date of Delivery: 10/11/22    OB History    Para Term  AB Living   3 1 1 0 1 1   SAB IAB Ectopic Molar Multiple Live Births   1 0 0 0 0 1      # Outcome Date GA Lbr Abraham/2nd Weight Sex Delivery Anes PTL Lv   3 Current            2 Term 20 40w5d 04:29 / 00:15 7 lb 8.4 oz (3.413 kg) F Vag-Spont None N AIMEE      Complications: Post-dates pregnancy   1 SAB 2016 6w0d    SAB   ND        Past Medical History:   Diagnosis Date    Abnormal Pap smear of cervix     Anemia     Depression 9/15/2021    Endometriosis     Migraine        Past Surgical History:   Procedure Laterality Date    APPENDECTOMY      LAPAROSCOPY      x2    TONSILLECTOMY         Social History     Tobacco Use   Smoking Status Never   Smokeless Tobacco Never        Social History     Substance and Sexual Activity   Alcohol Use No       No results found for this visit on 22. HPI: here for routine ob visit, denies c/o     PT denies fever, chills, nausea and vomiting       Vitals:  Estimated body mass index is 30.57 kg/m² as calculated from the following:    Height as of 22: 5' 3\" (1.6 m). Weight as of this encounter: 172 lb 9.6 oz (78.3 kg). BP: 122/74  Weight: 172 lb 9.6 oz (78.3 kg)  Patient Position: Sitting  Albumin: Trace  Glucose: Negative  Movement: Present           Abdomen: round,soft,nontender    Results reviewed today:    No results found for this visit on 22. ASSESSMENT & Plan    Diagnosis Orders   1. Encounter for supervision of other normal pregnancy in third trimester        2. 36 weeks gestation of pregnancy                  I am having Tete Durant maintain her Prenatal Vitamin, fish oil, SUPER B COMPLEX/C PO, Vitamin D3, and acetaminophen. Return in about 1 week (around 2022) for ob.     There are no Patient Instructions on file for this visit.              Kyara Browning, [address] [facility] 10:47 AM

## 2022-09-26 ENCOUNTER — ROUTINE PRENATAL (OUTPATIENT)
Dept: OBGYN | Age: 27
End: 2022-09-26

## 2022-09-26 VITALS — DIASTOLIC BLOOD PRESSURE: 72 MMHG | WEIGHT: 172 LBS | BODY MASS INDEX: 30.47 KG/M2 | SYSTOLIC BLOOD PRESSURE: 116 MMHG

## 2022-09-26 DIAGNOSIS — Z3A.38 38 WEEKS GESTATION OF PREGNANCY: ICD-10-CM

## 2022-09-26 DIAGNOSIS — Z34.83 ENCOUNTER FOR SUPERVISION OF OTHER NORMAL PREGNANCY IN THIRD TRIMESTER: Primary | ICD-10-CM

## 2022-09-26 PROCEDURE — 0502F SUBSEQUENT PRENATAL CARE: CPT | Performed by: ADVANCED PRACTICE MIDWIFE

## 2022-09-27 NOTE — PROGRESS NOTES
Nadiya Juarez is here at 38w0d for:    Chief Complaint   Patient presents with    Routine Prenatal Visit     Off and and cramps, slight spotting last week. Estimated Due Date: Estimated Date of Delivery: 10/11/22    OB History    Para Term  AB Living   3 1 1 0 1 1   SAB IAB Ectopic Molar Multiple Live Births   1 0 0 0 0 1      # Outcome Date GA Lbr Abraham/2nd Weight Sex Delivery Anes PTL Lv   3 Current            2 Term 20 40w5d 04:29 / 00:15 7 lb 8.4 oz (3.413 kg) F Vag-Spont None N AIMEE      Complications: Post-dates pregnancy   1 SAB 2016 6w0d    SAB   ND        Past Medical History:   Diagnosis Date    Abnormal Pap smear of cervix     Anemia     Depression 9/15/2021    Endometriosis     Migraine        Past Surgical History:   Procedure Laterality Date    APPENDECTOMY      LAPAROSCOPY      x2    TONSILLECTOMY         Social History     Tobacco Use   Smoking Status Never   Smokeless Tobacco Never        Social History     Substance and Sexual Activity   Alcohol Use No       No results found for this visit on 22. HPI: here for routine ob visit, denies c/o     PT denies fever, chills, nausea and vomiting       Vitals:  Estimated body mass index is 30.47 kg/m² as calculated from the following:    Height as of 22: 5' 3\" (1.6 m). Weight as of this encounter: 172 lb (78 kg). BP: 116/72  Weight: 172 lb (78 kg)  Patient Position: Sitting  Albumin: Trace  Glucose: Negative  Movement: Present           Abdomen: round,soft, nontender    Results reviewed today:    No results found for this visit on 22. ASSESSMENT & Plan    Diagnosis Orders   1. Encounter for supervision of other normal pregnancy in third trimester        2. 38 weeks gestation of pregnancy                  I am having Carolina Richards. Heater maintain her Prenatal Vitamin, fish oil, SUPER B COMPLEX/C PO, Vitamin D3, and acetaminophen. Return in about 1 week (around 10/3/2022) for ob.     There are no Patient Instructions on file for this visit.              GERMANIA Fairchild - ROLAND,9/27/2022 1:29 PM

## 2022-10-03 ENCOUNTER — ROUTINE PRENATAL (OUTPATIENT)
Dept: OBGYN | Age: 27
End: 2022-10-03

## 2022-10-03 VITALS — SYSTOLIC BLOOD PRESSURE: 118 MMHG | DIASTOLIC BLOOD PRESSURE: 72 MMHG | BODY MASS INDEX: 30.65 KG/M2 | WEIGHT: 173 LBS

## 2022-10-03 DIAGNOSIS — Z34.83 ENCOUNTER FOR SUPERVISION OF OTHER NORMAL PREGNANCY IN THIRD TRIMESTER: Primary | ICD-10-CM

## 2022-10-03 DIAGNOSIS — Z3A.38 38 WEEKS GESTATION OF PREGNANCY: ICD-10-CM

## 2022-10-03 PROCEDURE — 0502F SUBSEQUENT PRENATAL CARE: CPT | Performed by: ADVANCED PRACTICE MIDWIFE

## 2022-10-03 NOTE — PROGRESS NOTES
10/10/2022) for ob. There are no Patient Instructions on file for this visit.              GERMANIA Mazariegos CNM,10/3/2022 8:25 AM

## 2022-10-10 ENCOUNTER — ROUTINE PRENATAL (OUTPATIENT)
Dept: OBGYN | Age: 27
End: 2022-10-10

## 2022-10-10 VITALS — BODY MASS INDEX: 30.65 KG/M2 | SYSTOLIC BLOOD PRESSURE: 122 MMHG | DIASTOLIC BLOOD PRESSURE: 70 MMHG | WEIGHT: 173 LBS

## 2022-10-10 DIAGNOSIS — Z34.83 ENCOUNTER FOR SUPERVISION OF OTHER NORMAL PREGNANCY IN THIRD TRIMESTER: Primary | ICD-10-CM

## 2022-10-10 DIAGNOSIS — Z3A.39 39 WEEKS GESTATION OF PREGNANCY: ICD-10-CM

## 2022-10-10 PROCEDURE — 0502F SUBSEQUENT PRENATAL CARE: CPT | Performed by: ADVANCED PRACTICE MIDWIFE

## 2022-10-10 NOTE — PROGRESS NOTES
Sherwin Ramires is here at 39w6d for:    Chief Complaint   Patient presents with    Routine Prenatal Visit     Scheduled for induction 10/08/2022. Estimated Due Date: Estimated Date of Delivery: 10/11/22    OB History    Para Term  AB Living   3 1 1 0 1 1   SAB IAB Ectopic Molar Multiple Live Births   1 0 0 0 0 1      # Outcome Date GA Lbr Abraham/2nd Weight Sex Delivery Anes PTL Lv   3 Current            2 Term 20 40w5d 04:29 / 00:15 7 lb 8.4 oz (3.413 kg) F Vag-Spont None N AIMEE      Complications: Post-dates pregnancy   1 SAB 2016 6w0d    SAB   ND        Past Medical History:   Diagnosis Date    Abnormal Pap smear of cervix     Anemia     Depression 9/15/2021    Endometriosis     Migraine        Past Surgical History:   Procedure Laterality Date    APPENDECTOMY      LAPAROSCOPY      x2    TONSILLECTOMY         Social History     Tobacco Use   Smoking Status Never   Smokeless Tobacco Never        Social History     Substance and Sexual Activity   Alcohol Use No       No results found for this visit on 10/10/22. HPI: here for routine ob visit, requests induction at term     PT denies fever, chills, nausea and vomiting       Vitals:  Estimated body mass index is 30.65 kg/m² as calculated from the following:    Height as of 22: 5' 3\" (1.6 m). Weight as of this encounter: 173 lb (78.5 kg). BP: 122/70  Weight: 173 lb (78.5 kg)  Patient Position: Sitting  Albumin: Trace  Glucose: Negative  Movement: Present           Abdomen: round,soft, nontender    Results reviewed today:    No results found for this visit on 10/10/22. ASSESSMENT & Plan    Diagnosis Orders   1. Encounter for supervision of other normal pregnancy in third trimester        2. 39 weeks gestation of pregnancy                  I am having Diane Wood. Heater maintain her Prenatal Vitamin, fish oil, SUPER B COMPLEX/C PO, Vitamin D3, and acetaminophen. Return for induction tomorrow.     There are no Patient Instructions on file for this visit.              GERMANIA Paez CNM,10/10/2022 8:51 AM

## 2022-10-11 ENCOUNTER — HOSPITAL ENCOUNTER (INPATIENT)
Age: 27
LOS: 2 days | Discharge: HOME | End: 2022-10-13
Attending: ADVANCED PRACTICE MIDWIFE | Admitting: ADVANCED PRACTICE MIDWIFE
Payer: COMMERCIAL

## 2022-10-11 ENCOUNTER — APPOINTMENT (OUTPATIENT)
Dept: LABOR AND DELIVERY | Age: 27
End: 2022-10-11
Payer: COMMERCIAL

## 2022-10-11 PROBLEM — Z34.90 TERM PREGNANCY: Status: ACTIVE | Noted: 2022-10-11

## 2022-10-11 LAB
ABSOLUTE EOS #: 0.08 K/UL (ref 0–0.44)
ABSOLUTE IMMATURE GRANULOCYTE: <0.03 K/UL (ref 0–0.3)
ABSOLUTE LYMPH #: 2.04 K/UL (ref 1.1–3.7)
ABSOLUTE MONO #: 0.65 K/UL (ref 0.1–1.2)
AMPHETAMINE SCREEN URINE: NEGATIVE
BARBITURATE SCREEN URINE: NEGATIVE
BASOPHILS # BLD: 0 % (ref 0–2)
BASOPHILS ABSOLUTE: <0.03 K/UL (ref 0–0.2)
BENZODIAZEPINE SCREEN, URINE: NEGATIVE
BUPRENORPHINE URINE: NEGATIVE
CANNABINOID SCREEN URINE: NEGATIVE
COCAINE METABOLITE, URINE: NEGATIVE
EOSINOPHILS RELATIVE PERCENT: 1 % (ref 1–4)
HCT VFR BLD CALC: 38.5 % (ref 36.3–47.1)
HEMOGLOBIN: 13.5 G/DL (ref 11.9–15.1)
IMMATURE GRANULOCYTES: 0 %
LYMPHOCYTES # BLD: 32 % (ref 24–43)
MCH RBC QN AUTO: 33.3 PG (ref 25.2–33.5)
MCHC RBC AUTO-ENTMCNC: 35.1 G/DL (ref 28.4–34.8)
MCV RBC AUTO: 94.8 FL (ref 82.6–102.9)
METHADONE SCREEN, URINE: NEGATIVE
METHAMPHETAMINE, URINE: NEGATIVE
MONOCYTES # BLD: 10 % (ref 3–12)
NRBC AUTOMATED: 0 PER 100 WBC
OPIATES, URINE: NEGATIVE
OXYCODONE SCREEN URINE: NEGATIVE
PDW BLD-RTO: 13.4 % (ref 11.8–14.4)
PHENCYCLIDINE, URINE: NEGATIVE
PLATELET # BLD: 168 K/UL (ref 138–453)
PMV BLD AUTO: 11.3 FL (ref 8.1–13.5)
PROPOXYPHENE, URINE: NEGATIVE
RBC # BLD: 4.06 M/UL (ref 3.95–5.11)
SEG NEUTROPHILS: 57 % (ref 36–65)
SEGMENTED NEUTROPHILS ABSOLUTE COUNT: 3.63 K/UL (ref 1.5–8.1)
TRICYCLIC ANTIDEPRESSANTS, UR: NEGATIVE
WBC # BLD: 6.4 K/UL (ref 3.5–11.3)

## 2022-10-11 PROCEDURE — 3E033VJ INTRODUCTION OF OTHER HORMONE INTO PERIPHERAL VEIN, PERCUTANEOUS APPROACH: ICD-10-PCS | Performed by: ADVANCED PRACTICE MIDWIFE

## 2022-10-11 PROCEDURE — 80306 DRUG TEST PRSMV INSTRMNT: CPT

## 2022-10-11 PROCEDURE — 2500000003 HC RX 250 WO HCPCS: Performed by: ADVANCED PRACTICE MIDWIFE

## 2022-10-11 PROCEDURE — 59400 OBSTETRICAL CARE: CPT | Performed by: ADVANCED PRACTICE MIDWIFE

## 2022-10-11 PROCEDURE — 10907ZC DRAINAGE OF AMNIOTIC FLUID, THERAPEUTIC FROM PRODUCTS OF CONCEPTION, VIA NATURAL OR ARTIFICIAL OPENING: ICD-10-PCS | Performed by: ADVANCED PRACTICE MIDWIFE

## 2022-10-11 PROCEDURE — 2580000003 HC RX 258: Performed by: ADVANCED PRACTICE MIDWIFE

## 2022-10-11 PROCEDURE — 1220000000 HC SEMI PRIVATE OB R&B

## 2022-10-11 PROCEDURE — 6360000002 HC RX W HCPCS: Performed by: ADVANCED PRACTICE MIDWIFE

## 2022-10-11 PROCEDURE — 6370000000 HC RX 637 (ALT 250 FOR IP): Performed by: ADVANCED PRACTICE MIDWIFE

## 2022-10-11 PROCEDURE — 85025 COMPLETE CBC W/AUTO DIFF WBC: CPT

## 2022-10-11 PROCEDURE — 7200000001 HC VAGINAL DELIVERY

## 2022-10-11 PROCEDURE — 36415 COLL VENOUS BLD VENIPUNCTURE: CPT

## 2022-10-11 RX ORDER — SODIUM CHLORIDE 0.9 % (FLUSH) 0.9 %
5-40 SYRINGE (ML) INJECTION PRN
Status: DISCONTINUED | OUTPATIENT
Start: 2022-10-11 | End: 2022-10-13 | Stop reason: HOSPADM

## 2022-10-11 RX ORDER — TRANEXAMIC ACID 10 MG/ML
1000 INJECTION, SOLUTION INTRAVENOUS
Status: DISCONTINUED | OUTPATIENT
Start: 2022-10-11 | End: 2022-10-11

## 2022-10-11 RX ORDER — ONDANSETRON 2 MG/ML
4 INJECTION INTRAMUSCULAR; INTRAVENOUS EVERY 6 HOURS PRN
Status: DISCONTINUED | OUTPATIENT
Start: 2022-10-11 | End: 2022-10-11

## 2022-10-11 RX ORDER — SODIUM CHLORIDE 0.9 % (FLUSH) 0.9 %
5-40 SYRINGE (ML) INJECTION EVERY 12 HOURS SCHEDULED
Status: DISCONTINUED | OUTPATIENT
Start: 2022-10-11 | End: 2022-10-11

## 2022-10-11 RX ORDER — MISOPROSTOL 100 UG/1
800 TABLET ORAL PRN
Status: DISCONTINUED | OUTPATIENT
Start: 2022-10-11 | End: 2022-10-13 | Stop reason: HOSPADM

## 2022-10-11 RX ORDER — NALBUPHINE HCL 10 MG/ML
10 AMPUL (ML) INJECTION
Status: DISCONTINUED | OUTPATIENT
Start: 2022-10-11 | End: 2022-10-11

## 2022-10-11 RX ORDER — DOCUSATE SODIUM 100 MG/1
100 CAPSULE, LIQUID FILLED ORAL 2 TIMES DAILY PRN
Status: DISCONTINUED | OUTPATIENT
Start: 2022-10-11 | End: 2022-10-13 | Stop reason: HOSPADM

## 2022-10-11 RX ORDER — METHYLERGONOVINE MALEATE 0.2 MG/ML
200 INJECTION INTRAVENOUS PRN
Status: DISCONTINUED | OUTPATIENT
Start: 2022-10-11 | End: 2022-10-13 | Stop reason: HOSPADM

## 2022-10-11 RX ORDER — SODIUM CHLORIDE, SODIUM LACTATE, POTASSIUM CHLORIDE, AND CALCIUM CHLORIDE .6; .31; .03; .02 G/100ML; G/100ML; G/100ML; G/100ML
500 INJECTION, SOLUTION INTRAVENOUS PRN
Status: DISCONTINUED | OUTPATIENT
Start: 2022-10-11 | End: 2022-10-11

## 2022-10-11 RX ORDER — MISOPROSTOL 100 UG/1
900 TABLET ORAL PRN
Status: DISCONTINUED | OUTPATIENT
Start: 2022-10-11 | End: 2022-10-11

## 2022-10-11 RX ORDER — SODIUM CHLORIDE, SODIUM LACTATE, POTASSIUM CHLORIDE, AND CALCIUM CHLORIDE .6; .31; .03; .02 G/100ML; G/100ML; G/100ML; G/100ML
1000 INJECTION, SOLUTION INTRAVENOUS PRN
Status: DISCONTINUED | OUTPATIENT
Start: 2022-10-11 | End: 2022-10-11

## 2022-10-11 RX ORDER — SODIUM CHLORIDE 9 MG/ML
25 INJECTION, SOLUTION INTRAVENOUS PRN
Status: DISCONTINUED | OUTPATIENT
Start: 2022-10-11 | End: 2022-10-11

## 2022-10-11 RX ORDER — ACETAMINOPHEN 500 MG
1000 TABLET ORAL EVERY 8 HOURS PRN
Status: DISCONTINUED | OUTPATIENT
Start: 2022-10-11 | End: 2022-10-13 | Stop reason: HOSPADM

## 2022-10-11 RX ORDER — MODIFIED LANOLIN
OINTMENT (GRAM) TOPICAL PRN
Status: DISCONTINUED | OUTPATIENT
Start: 2022-10-11 | End: 2022-10-13 | Stop reason: HOSPADM

## 2022-10-11 RX ORDER — SODIUM CHLORIDE 0.9 % (FLUSH) 0.9 %
5-40 SYRINGE (ML) INJECTION PRN
Status: DISCONTINUED | OUTPATIENT
Start: 2022-10-11 | End: 2022-10-11

## 2022-10-11 RX ORDER — LIDOCAINE HYDROCHLORIDE 10 MG/ML
30 INJECTION, SOLUTION EPIDURAL; INFILTRATION; INTRACAUDAL; PERINEURAL PRN
Status: COMPLETED | OUTPATIENT
Start: 2022-10-11 | End: 2022-10-11

## 2022-10-11 RX ORDER — CARBOPROST TROMETHAMINE 250 UG/ML
250 INJECTION, SOLUTION INTRAMUSCULAR PRN
Status: DISCONTINUED | OUTPATIENT
Start: 2022-10-11 | End: 2022-10-11

## 2022-10-11 RX ORDER — SODIUM CHLORIDE 9 MG/ML
INJECTION, SOLUTION INTRAVENOUS PRN
Status: DISCONTINUED | OUTPATIENT
Start: 2022-10-11 | End: 2022-10-13 | Stop reason: HOSPADM

## 2022-10-11 RX ORDER — CARBOPROST TROMETHAMINE 250 UG/ML
250 INJECTION, SOLUTION INTRAMUSCULAR PRN
Status: DISCONTINUED | OUTPATIENT
Start: 2022-10-11 | End: 2022-10-13 | Stop reason: HOSPADM

## 2022-10-11 RX ORDER — SODIUM CHLORIDE, SODIUM LACTATE, POTASSIUM CHLORIDE, CALCIUM CHLORIDE 600; 310; 30; 20 MG/100ML; MG/100ML; MG/100ML; MG/100ML
INJECTION, SOLUTION INTRAVENOUS CONTINUOUS
Status: DISCONTINUED | OUTPATIENT
Start: 2022-10-11 | End: 2022-10-11

## 2022-10-11 RX ORDER — METHYLERGONOVINE MALEATE 0.2 MG/ML
200 INJECTION INTRAVENOUS PRN
Status: DISCONTINUED | OUTPATIENT
Start: 2022-10-11 | End: 2022-10-11

## 2022-10-11 RX ORDER — IBUPROFEN 800 MG/1
800 TABLET ORAL EVERY 8 HOURS PRN
Status: DISCONTINUED | OUTPATIENT
Start: 2022-10-11 | End: 2022-10-13 | Stop reason: HOSPADM

## 2022-10-11 RX ORDER — SEVOFLURANE 250 ML/250ML
1 LIQUID RESPIRATORY (INHALATION) CONTINUOUS PRN
Status: DISCONTINUED | OUTPATIENT
Start: 2022-10-11 | End: 2022-10-11

## 2022-10-11 RX ORDER — ACETAMINOPHEN 325 MG/1
650 TABLET ORAL EVERY 4 HOURS PRN
Status: DISCONTINUED | OUTPATIENT
Start: 2022-10-11 | End: 2022-10-11

## 2022-10-11 RX ORDER — SODIUM CHLORIDE 0.9 % (FLUSH) 0.9 %
5-40 SYRINGE (ML) INJECTION EVERY 12 HOURS SCHEDULED
Status: DISCONTINUED | OUTPATIENT
Start: 2022-10-11 | End: 2022-10-13 | Stop reason: HOSPADM

## 2022-10-11 RX ADMIN — DEXTROSE MONOHYDRATE 5 MILLION UNITS: 50 INJECTION, SOLUTION INTRAVENOUS at 06:04

## 2022-10-11 RX ADMIN — Medication 2.5 MILLION UNITS: at 09:50

## 2022-10-11 RX ADMIN — BENZOCAINE AND LEVOMENTHOL: 200; 5 SPRAY TOPICAL at 20:16

## 2022-10-11 RX ADMIN — SODIUM CHLORIDE, POTASSIUM CHLORIDE, SODIUM LACTATE AND CALCIUM CHLORIDE: 600; 310; 30; 20 INJECTION, SOLUTION INTRAVENOUS at 05:57

## 2022-10-11 RX ADMIN — SODIUM CHLORIDE, POTASSIUM CHLORIDE, SODIUM LACTATE AND CALCIUM CHLORIDE: 600; 310; 30; 20 INJECTION, SOLUTION INTRAVENOUS at 08:48

## 2022-10-11 RX ADMIN — LIDOCAINE HYDROCHLORIDE 3 ML: 10 INJECTION, SOLUTION EPIDURAL; INFILTRATION; INTRACAUDAL; PERINEURAL at 15:40

## 2022-10-11 RX ADMIN — Medication: at 20:16

## 2022-10-11 RX ADMIN — ACETAMINOPHEN 1000 MG: 500 TABLET ORAL at 20:16

## 2022-10-11 RX ADMIN — METHYLERGONOVINE MALEATE 200 MCG: 0.2 INJECTION INTRAVENOUS at 15:50

## 2022-10-11 RX ADMIN — Medication 1 MILLI-UNITS/MIN: at 06:04

## 2022-10-11 RX ADMIN — DOCUSATE SODIUM 100 MG: 100 CAPSULE, LIQUID FILLED ORAL at 20:16

## 2022-10-11 RX ADMIN — Medication 2.5 MILLION UNITS: at 13:59

## 2022-10-11 ASSESSMENT — PAIN DESCRIPTION - DESCRIPTORS
DESCRIPTORS: CRAMPING

## 2022-10-11 ASSESSMENT — PAIN - FUNCTIONAL ASSESSMENT: PAIN_FUNCTIONAL_ASSESSMENT: ACTIVITIES ARE NOT PREVENTED

## 2022-10-11 ASSESSMENT — PAIN SCALES - GENERAL: PAINLEVEL_OUTOF10: 2

## 2022-10-11 ASSESSMENT — PAIN DESCRIPTION - LOCATION: LOCATION: ABDOMEN

## 2022-10-11 NOTE — PROGRESS NOTES
Patient calls out at this time c/o pressure. SVE 5/80/-1. Pt laid on her right side with peanut ball for 5 mins and patient did not want to stay in that position any longer. Pt back to sitting on the birthing ball.

## 2022-10-11 NOTE — PROGRESS NOTES
Patient continues to sit on the birthing ball, pt comfortable at this time. Rates contractions a 2/10.  Chicken broth given to patient per request.

## 2022-10-11 NOTE — PLAN OF CARE
Problem: Vaginal Birth or  Section  Goal: Fetal and maternal status remain reassuring during the birth process  Description:  Birth OB-Pregnancy care plan goal which identifies if the fetal and maternal status remain reassuring during the birth process  10/11/2022 1616 by Zay Obrien RN  Outcome: Completed  10/11/2022 1616 by Zay Obrien RN  Outcome: Progressing

## 2022-10-11 NOTE — PROGRESS NOTES
Minimal variability with late decelerations continue. Pitocin turned off at this time. Will notify Walker Dueñas CNM.

## 2022-10-11 NOTE — PROGRESS NOTES
IV fluid bolus initiated at this time for minimal variability.  Pt also given apple juice per request.

## 2022-10-11 NOTE — FLOWSHEET NOTE
Patient ambulates to room 210 accompanied by  for an elective induction. Oriented patient to room and call system and she verbalized understanding.   Patient to bathroom to change into gown and

## 2022-10-11 NOTE — PROGRESS NOTES
Patient called out c/o pressure in her bottom. SVE completed and pt is 9/100/0. S. Joe Singh phoned at [phone] to attend delivery.

## 2022-10-11 NOTE — H&P
Department of Obstetrics and Gynecology   Obstetrics History and Physical  Ana Gonsales  H&P Admission Inpatient  Note        CHIEF COMPLAINT:  term multip, request for induction    HISTORY OF PRESENT ILLNESS:      The patient is a 28 y.o. female at 37w0d. OB History          3    Para   2    Term   2       0    AB   1    Living   2         SAB   1    IAB   0    Ectopic   0    Molar   0    Multiple   0    Live Births   2            Patient presents with a chief complaint as above and is being admitted for induction    Estimated Due Date: Estimated Date of Delivery: 10/11/22    PRENATAL CARE:    Complicated by: none    PAST OB HISTORY:  OB History          3    Para   2    Term   2       0    AB   1    Living   2         SAB   1    IAB   0    Ectopic   0    Molar   0    Multiple   0    Live Births   2                Past Medical History:        Diagnosis Date    Abnormal Pap smear of cervix     Anemia     Depression 09/15/2021    Endometriosis      Past Surgical History:        Procedure Laterality Date    APPENDECTOMY      LAPAROSCOPY      x2    TONSILLECTOMY       Allergies:  Patient has no known allergies.     Social History:    Social History     Socioeconomic History    Marital status:      Spouse name: Not on file    Number of children: Not on file    Years of education: Not on file    Highest education level: Not on file   Occupational History    Not on file   Tobacco Use    Smoking status: Never    Smokeless tobacco: Never   Vaping Use    Vaping Use: Never used   Substance and Sexual Activity    Alcohol use: No    Drug use: No    Sexual activity: Yes     Partners: Male     Birth control/protection: Condom   Other Topics Concern    Not on file   Social History Narrative    Not on file     Social Determinants of Health     Financial Resource Strain: Not on file   Food Insecurity: Not on file   Transportation Needs: Not on file   Physical Activity: Not on file   Stress: Not on file   Social Connections: Not on file   Intimate Partner Violence: Not on file   Housing Stability: Not on file     Family History:       Problem Relation Age of Onset    Other Other         no family h/o stroke or ovarian cancer     Breast Cancer Maternal Aunt     No Known Problems Mother     No Known Problems Father     No Known Problems Brother     No Known Problems Maternal Grandmother     Cirrhosis Maternal Grandfather     Cancer Paternal Grandfather         Bone    No Known Problems Brother     No Known Problems Brother      Medications Prior to Admission:  Medications Prior to Admission: acetaminophen (TYLENOL) 500 MG tablet, Take 1,000 mg by mouth every 6 hours as needed for Pain  Omega-3 Fatty Acids (FISH OIL) 1000 MG CAPS, Take 3,000 mg by mouth 3 times daily  SUPER B COMPLEX/C PO, Take by mouth  Cholecalciferol (VITAMIN D3) 30 MCG/15ML LIQD, Take by mouth  Prenatal Vit-Fe Fumarate-FA (PRENATAL VITAMIN) 27-0.8 MG TABS, Take 1 tablet by mouth    REVIEW OF SYSTEMS:    CONSTITUTIONAL:  negative  RESPIRATORY:  negative  CARDIOVASCULAR:  negative  GASTROINTESTINAL:  negative  ALLERGIC/IMMUNOLOGIC:  negative  NEUROLOGICAL:  negative  BEHAVIOR/PSYCH:  negative    PHYSICAL EXAM:  Vitals:    10/11/22 1711 10/11/22 1725 10/11/22 1740 10/11/22 1804   BP: (!) 144/84 119/64 115/62 (!) 117/59   Pulse: 92 68 69 66   Resp: 16 16 16 16   Temp:       TempSrc:         General appearance:  awake, alert, cooperative, no apparent distress, and appears stated age  Neurologic:  Awake, alert, oriented to name, place and time. Lungs:  No increased work of breathing, good air exchange  Abdomen:  Soft, non tender, gravid, consistent with her gestational age, EFW by Leopald's manouever was 7 1/2 lbs   Fetal heart rate:  Reassuring.   Pelvis:  Adequate pelvis  Cervix: 4 cm   Contraction frequency:  3-6 minutes    Membranes:  Intact    Labs: CBC:   Lab Results   Component Value Date/Time    WBC 6.4 10/11/2022 05:54 AM    RBC 4.06 10/11/2022 05:54 AM    HGB 13.5 10/11/2022 05:54 AM    HCT 38.5 10/11/2022 05:54 AM    MCV 94.8 10/11/2022 05:54 AM    MCH 33.3 10/11/2022 05:54 AM    MCHC 35.1 10/11/2022 05:54 AM    RDW 13.4 10/11/2022 05:54 AM     10/11/2022 05:54 AM    MPV 11.3 10/11/2022 05:54 AM       ASSESSMENT AND PLAN:    Estimated length of stay: 2 days    Principal Problem:    Term pregnancy  Plan: induction of labor      Labor: Admit, anticipate normal delivery, routine labor orders  Fetus: Reassuring, Cat 1  GBS: Yes  Other: pitocin, arom with consult Dr. Karel Parada.  ROLAND Roberts,10/11/2022 7:42 PM

## 2022-10-11 NOTE — PROGRESS NOTES
Noel Rdz CNM in unit and made aware of minimal variability with late decelerations and pitocin being shut off at 0744. Orders to restart pitocin at 1milli/unit in 30 minutes.

## 2022-10-11 NOTE — PROGRESS NOTES
Message left with Chu Simms [facility] office to have CNM phone unit when available.

## 2022-10-11 NOTE — PROGRESS NOTES
Rick Blackwell CNM phones unit back at this time. Notified of pitocin infusion rate and SVE. Orders to increase pitocin to 24milli/unit, leave it at that rate for 1 hour and recheck SVE and notify CNM.

## 2022-10-11 NOTE — PLAN OF CARE
Problem: Postpartum  Goal: Experiences normal postpartum course  Description:  Postpartum OB-Pregnancy care plan goal which identifies if the mother is experiencing a normal postpartum course  10/11/2022 1932 by Efrain Osorio RN  Outcome: Progressing  10/11/2022 161 by Lissett Tolentino RN  Outcome: Progressing  Goal: Appropriate maternal -  bonding  Description:  Postpartum OB-Pregnancy care plan goal which identifies if the mother and  are bonding appropriately  10/11/2022 1932 by Efrain Osorio RN  Outcome: Progressing  10/11/2022 161 by Lissett Tolentino RN  Outcome: Progressing  Goal: Establishment of infant feeding pattern  Description:  Postpartum OB-Pregnancy care plan goal which identifies if the mother is establishing a feeding pattern with their   10/11/2022 1932 by Efrain Osorio RN  Outcome: Progressing  10/11/2022 1616 by Lissett Tolentino RN  Outcome: Progressing  Goal: Incisions, wounds, or drain sites healing without S/S of infection  10/11/2022 1932 by Efrain Osorio RN  Outcome: Progressing  10/11/2022 1616 by Lissett Tolentino RN  Outcome: Progressing     Problem: Pain  Goal: Verbalizes/displays adequate comfort level or baseline comfort level  10/11/2022 1932 by Efrain Osorio RN  Outcome: Progressing  10/11/2022 161 by Lissett Tolentino RN  Outcome: Progressing     Problem: Infection - Adult  Goal: Absence of infection at discharge  10/11/2022 1932 by Efrain Osorio RN  Outcome: Progressing  10/11/2022 1616 by Lissett Tolentino RN  Outcome: Progressing  Goal: Absence of infection during hospitalization  10/11/2022 1932 by Efrain Osorio RN  Outcome: Progressing  10/11/2022 1616 by Lissett Tolentino RN  Outcome: Progressing  Goal: Absence of fever/infection during anticipated neutropenic period  10/11/2022 1932 by Efrain Osorio RN  Outcome: Progressing  10/11/2022 161 by Lissett Tolentino RN  Outcome: Progressing     Problem: Safety - Adult  Goal: Free from fall injury  10/11/2022 1932 by Tavo Willard RN  Outcome: Progressing  10/11/2022 1616 by Moris Davis RN  Outcome: Progressing     Problem: Discharge Planning  Goal: Discharge to home or other facility with appropriate resources  10/11/2022 1932 by Tavo Willard RN  Outcome: Progressing  10/11/2022 1616 by Moris Davis RN  Outcome: Progressing     Problem: Chronic Conditions and Co-morbidities  Goal: Patient's chronic conditions and co-morbidity symptoms are monitored and maintained or improved  10/11/2022 1932 by Tavo Willard RN  Outcome: Progressing  10/11/2022 1616 by Moris Davis RN  Outcome: Progressing

## 2022-10-11 NOTE — PROGRESS NOTES
Writer spoke with pediatrician on call, Dr. Russell Levi about  admission. Maternal history, GBS positive status and prominent renal pelvis reviewed. Okay to place normal  orders and hypoglycemia orders as needed.

## 2022-10-12 PROCEDURE — 1220000000 HC SEMI PRIVATE OB R&B

## 2022-10-12 PROCEDURE — 99024 POSTOP FOLLOW-UP VISIT: CPT | Performed by: ADVANCED PRACTICE MIDWIFE

## 2022-10-12 PROCEDURE — 6370000000 HC RX 637 (ALT 250 FOR IP): Performed by: ADVANCED PRACTICE MIDWIFE

## 2022-10-12 RX ADMIN — IBUPROFEN 800 MG: 800 TABLET, FILM COATED ORAL at 00:38

## 2022-10-12 RX ADMIN — IBUPROFEN 800 MG: 800 TABLET, FILM COATED ORAL at 14:31

## 2022-10-12 RX ADMIN — ACETAMINOPHEN 1000 MG: 500 TABLET ORAL at 20:07

## 2022-10-12 ASSESSMENT — PAIN DESCRIPTION - DESCRIPTORS
DESCRIPTORS: CRAMPING

## 2022-10-12 ASSESSMENT — PAIN SCALES - GENERAL
PAINLEVEL_OUTOF10: 0
PAINLEVEL_OUTOF10: 1
PAINLEVEL_OUTOF10: 1

## 2022-10-12 ASSESSMENT — PAIN - FUNCTIONAL ASSESSMENT
PAIN_FUNCTIONAL_ASSESSMENT: ACTIVITIES ARE NOT PREVENTED
PAIN_FUNCTIONAL_ASSESSMENT: ACTIVITIES ARE NOT PREVENTED

## 2022-10-12 ASSESSMENT — PAIN DESCRIPTION - LOCATION
LOCATION: ABDOMEN

## 2022-10-12 NOTE — LACTATION NOTE
This note was copied from a baby's chart. Oral exam per mom's request.  Upper lip flanges to nares, noted slight blanching of gums when lip lifted; prominent labial frenum. No forward protrusion of tongue noted. Limited lateral movement. Infant gaggy. Unable to elicit suckling on gloved finger. Thin, short lingual frenum visualized when tongue lifted. Attachment to tip of tongue. Noted that tongue lies at floor of mouth when infant cries. Discussed oral exam with parents. Mom states that pediatrician has offered to clip anterior lingual frenulum here. They had a previous child's oral ties lasered at a pediatric dentist, so mom is aware of that procedure. They will discuss and will let staff know if they want ped to clip here at the hospital.    Also discussed massage or chiropractic adjustment for tension. Noted that infant's hips, shoulders, and head curve to his right. Mom will consider therapy.       [x] Tongue Tie Information for Families - Lactation Education Resources      [x] Contact information for dentists, craniosacral therapists, and chiropractors      [x] Facial massage and wound care video link      [x] After tongue/lip release instructions      [x] [facility] for [address] [place] [address]

## 2022-10-12 NOTE — LACTATION NOTE
Lactation education:    [x] Latch/ good latch vs shallow latch/ steps to obtaining deep latch    [x] How to know if infant is eating enough/ feedings per 24 hours, wet/dirty diapers    [x] Feeding/satiety cues      Lactation education resources given:     [x]  How to Breastfeed your baby - [address] publication      [x]  Follow up support information    [x]  Breast milk storage guidelines - [facility]    [x]  Breastpump cleaning guidelines - [facility]     [x]  Breastfeeding & Safe Sleep handout - [address] publication    [x]  Calling All Dads! Handout - [address] publication      []  Breast and Nipple Care - [facility]     []  [address]    []  [place]    []  Going Back to Work - [facility]    []  Preventing Engorgement - [facility]    Supplies given:    [x]  Brush, soap and basin for breastpump cleaning    []  Insurance pump provided     [x]  [facility] pump set up for patient to use    Explained to patient, patient verbalizes understanding.         Signed:  Charlotte Manriquez RN, BSN, IBCLC

## 2022-10-12 NOTE — PROGRESS NOTES
Department of Obstetrics and Gynecology  Labor and Delivery       Post Partum Progress Note             SUBJECTIVE:  Patient is currently s/p vaginal delivery day #1. Reports did sleep approximately 5 hours last night of broken sleep. Is voiding without difficulty. Denies perineal pain, has minimal cramping. Lochia minimal.  Is not passing flatus as of yet. Infant is not nursing well - possible tongue/lip tie and lactation is working with her on this.       OBJECTIVE:      Vitals:  /65   Pulse 82   Temp 98.2 °F (36.8 °C)   Resp 16   LMP 01/04/2022   Breastfeeding Unknown   Patient Vitals for the past 24 hrs:   BP Temp Temp src Pulse Resp   10/12/22 0830 -- 98.2 °F (36.8 °C) -- -- 16   10/12/22 0724 108/65 -- -- 82 --   10/12/22 0722 108/65 97.7 °F (36.5 °C) Oral 82 16   10/12/22 0409 119/66 97.7 °F (36.5 °C) Oral 83 18   10/12/22 0026 104/62 98.3 °F (36.8 °C) Oral 78 16   10/11/22 1950 117/66 99 °F (37.2 °C) Oral 67 18   10/11/22 1804 (!) 117/59 -- -- 66 16   10/11/22 1740 115/62 -- -- 69 16   10/11/22 1725 119/64 -- -- 68 16   10/11/22 1711 (!) 144/84 -- -- 92 16   10/11/22 1655 (!) 116/55 -- -- 63 18   10/11/22 1640 108/75 -- -- 72 18   10/11/22 1625 124/79 -- -- 78 18   10/11/22 1610 122/76 -- -- 62 16   10/11/22 1556 111/71 98.1 °F (36.7 °C) Oral 70 16   10/11/22 1504 (!) 140/66 97.9 °F (36.6 °C) Oral 58 16   10/11/22 1404 120/71 -- -- 80 16   10/11/22 1334 110/61 -- -- 61 16   10/11/22 1303 104/69 97.4 °F (36.3 °C) Oral 91 18   10/11/22 1235 110/68 -- -- 80 16   10/11/22 1203 106/61 -- -- 79 16   10/11/22 1137 117/74 -- -- 68 16   10/11/22 1106 (!) 117/58 -- -- 85 16   10/11/22 1034 96/67 98.4 °F (36.9 °C) Oral 81 16   10/11/22 1004 122/78 -- -- 84 16         ABDOMEN: normal shape, position and consistency  GENITAL/URINARY:  External Genitalia:  General appearance; normal, Hair distribution; normal  Uterus:  normal postpartum day #1  Breast:normal appearance, no masses or tenderness  Cor: RRR no Murmurs  Pulmonary: clear to auscultation anterior and posterior  Extremities: no Clubbing cyanosis or ecchymosis    DATA:    CBC:   Lab Results   Component Value Date/Time    WBC 6.4 10/11/2022 05:54 AM    RBC 4.06 10/11/2022 05:54 AM    HGB 13.5 10/11/2022 05:54 AM    HCT 38.5 10/11/2022 05:54 AM    MCV 94.8 10/11/2022 05:54 AM    MCH 33.3 10/11/2022 05:54 AM    MCHC 35.1 10/11/2022 05:54 AM    RDW 13.4 10/11/2022 05:54 AM     10/11/2022 05:54 AM    MPV 11.3 10/11/2022 05:54 AM         ASSESSMENT :      Principal Problem:    Term pregnancy  Plan: Delivered        Plan:  Discharge home tomorrow  Continue to work closely with lactation

## 2022-10-13 VITALS
TEMPERATURE: 98.6 F | DIASTOLIC BLOOD PRESSURE: 71 MMHG | SYSTOLIC BLOOD PRESSURE: 112 MMHG | RESPIRATION RATE: 16 BRPM | HEART RATE: 66 BPM

## 2022-10-13 PROCEDURE — 6370000000 HC RX 637 (ALT 250 FOR IP): Performed by: ADVANCED PRACTICE MIDWIFE

## 2022-10-13 PROCEDURE — 99024 POSTOP FOLLOW-UP VISIT: CPT | Performed by: ADVANCED PRACTICE MIDWIFE

## 2022-10-13 RX ADMIN — IBUPROFEN 800 MG: 800 TABLET, FILM COATED ORAL at 00:07

## 2022-10-13 RX ADMIN — IBUPROFEN 800 MG: 800 TABLET, FILM COATED ORAL at 11:57

## 2022-10-13 ASSESSMENT — PAIN SCALES - GENERAL
PAINLEVEL_OUTOF10: 2
PAINLEVEL_OUTOF10: 3

## 2022-10-13 ASSESSMENT — PAIN DESCRIPTION - LOCATION: LOCATION: ABDOMEN

## 2022-10-13 ASSESSMENT — PAIN DESCRIPTION - DESCRIPTORS: DESCRIPTORS: CRAMPING

## 2022-10-13 NOTE — PROGRESS NOTES
Department of Obstetrics and Gynecology  Labor and Delivery       Post Partum Progress Note             SUBJECTIVE:  pt doing well today. Pt states she would like to go home today. OBJECTIVE:      Vitals:  BP (!) 101/58   Pulse 68   Temp 98.6 °F (37 °C) (Oral)   Resp 16   LMP 01/04/2022   Breastfeeding Unknown   Patient Vitals for the past 24 hrs:   BP Temp Temp src Pulse Resp   10/13/22 0352 (!) 101/58 98.6 °F (37 °C) Oral 68 16   10/12/22 2332 (!) 110/58 98.3 °F (36.8 °C) Oral 67 16   10/12/22 1952 111/73 97.9 °F (36.6 °C) Oral 75 15   10/12/22 1615 106/64 -- -- 93 --   10/12/22 1609 110/60 98.2 °F (36.8 °C) Oral -- 16   10/12/22 1126 (!) 120/59 97.9 °F (36.6 °C) Oral 78 16   10/12/22 0830 -- 98.2 °F (36.8 °C) -- -- 16         ABDOMEN: normal shape, position and consistency  GENITAL/URINARY:  External Genitalia:  General appearance; normal, Hair distribution; normal, Lesions absent  Uterus:  Size normal, Contour normal  Breast:normal appearance, no masses or tenderness  Cor: RRR no Murmurs  Pulmonary: clear to auscultation anterior and posterior  Extremities: no Clubbing cyanosis or ecchymosis    DATA:          ASSESSMENT :      Principal Problem:     Active Problems:    Postpartum state          Plan:  Continue care

## 2022-10-13 NOTE — PLAN OF CARE
Problem: Postpartum  Goal: Experiences normal postpartum course  Description:  Postpartum OB-Pregnancy care plan goal which identifies if the mother is experiencing a normal postpartum course  Outcome: Progressing  Goal: Appropriate maternal -  bonding  Description:  Postpartum OB-Pregnancy care plan goal which identifies if the mother and  are bonding appropriately  Outcome: Progressing  Goal: Establishment of infant feeding pattern  Description:  Postpartum OB-Pregnancy care plan goal which identifies if the mother is establishing a feeding pattern with their   Outcome: Progressing  Goal: Incisions, wounds, or drain sites healing without S/S of infection  Outcome: Progressing  Flowsheets (Taken 10/12/2022 1952)  Incisions, Wounds, or Drain Sites Healing Without Sign and Symptoms of Infection: ADMISSION and DAILY: Assess and document risk factors for pressure ulcer development     Problem: Pain  Goal: Verbalizes/displays adequate comfort level or baseline comfort level  Outcome: Progressing

## 2022-10-13 NOTE — PLAN OF CARE
Problem: Postpartum  Goal: Experiences normal postpartum course  Description:  Postpartum OB-Pregnancy care plan goal which identifies if the mother is experiencing a normal postpartum course  10/13/2022 1042 by Ameya Dumas RN  Outcome: Completed  10/12/2022 2130 by Per Keller RN  Outcome: Progressing  Goal: Appropriate maternal -  bonding  Description:  Postpartum OB-Pregnancy care plan goal which identifies if the mother and  are bonding appropriately  10/13/2022 1042 by Ameya Dumas RN  Outcome: Completed  10/12/2022 2130 by Per Keller RN  Outcome: Progressing  Goal: Establishment of infant feeding pattern  Description:  Postpartum OB-Pregnancy care plan goal which identifies if the mother is establishing a feeding pattern with their   10/13/2022 1042 by Ameya Dumas RN  Outcome: Completed  10/12/2022 2130 by Per Keller RN  Outcome: Progressing  Goal: Incisions, wounds, or drain sites healing without S/S of infection  10/13/2022 1042 by Ameya Dumas RN  Outcome: Completed  10/12/2022 2130 by Per Keller RN  Outcome: Progressing  Flowsheets (Taken 10/12/2022 1952)  Incisions, Wounds, or Drain Sites Healing Without Sign and Symptoms of Infection: ADMISSION and DAILY: Assess and document risk factors for pressure ulcer development     Problem: Pain  Goal: Verbalizes/displays adequate comfort level or baseline comfort level  10/13/2022 1042 by Ameya Dumas RN  Outcome: Completed  10/12/2022 2130 by Per Keller RN  Outcome: Progressing     Problem: Infection - Adult  Goal: Absence of infection at discharge  Outcome: Completed  Goal: Absence of infection during hospitalization  Outcome: Completed  Goal: Absence of fever/infection during anticipated neutropenic period  Outcome: Completed     Problem: Safety - Adult  Goal: Free from fall injury  Outcome: Completed     Problem: Discharge Planning  Goal: Discharge to home or other facility with appropriate resources  Outcome: Completed     Problem: Chronic Conditions and Co-morbidities  Goal: Patient's chronic conditions and co-morbidity symptoms are monitored and maintained or improved  Outcome: Completed

## 2022-10-13 NOTE — FLOWSHEET NOTE
Pt discharged home. Discharge instructions reviewed with pt. Copy of instructions given. Pt called and scheduled postpartum f/u appointment. All questions answered, pt verbalized understanding of discharge instructions.

## 2022-10-13 NOTE — DISCHARGE SUMMARY
Obstetrical Discharge Form        Gestational Age:40w0d    Antepartum complications: none    Date of Delivery: 10/11/22    Type of Delivery:        Delivered By:  Sariah SOLO CNM    Assisted By:N/A}      Baby: male    Anesthesia:  epidural      Intrapartum complications: None    Feeding method: breast    Blood type: A POSITIVE      Rubella:    Rubella Antibody, IGG   Date Value Ref Range Status   2022 152.2 IU/mL Final     Comment:                 REFERENCE RANGE:  <5.0       NON-REACTIVE (non-immune)  5.0 TO 9.9 EQUIVOCAL  >=10.0     REACTIVE     (immune)             T. Pallidium, IGG:    T. pallidum, IgG   Date Value Ref Range Status   2022 NONREACTIVE NONREACTIVE Final     Comment:           T. pallidum antibodies are not detected. There is no serological evidence of infection with T. pallidum (early primary syphilis   cannot be excluded). Retest in 2-4 weeks if syphilis is clinically suspect.              Hepatitis B Surface Antigen:   Hepatitis B Surface Ag   Date Value Ref Range Status   2022 NONREACTIVE NONREACTIVE Final     HIV:  No results found for: [id number]    Results for orders placed or performed during the hospital encounter of 10/11/22   DRUG SCREEN MULTI URINE   Result Value Ref Range    Amphetamine Screen, Ur NEGATIVE NEGATIVE    Barbiturate Screen, Ur NEGATIVE NEGATIVE    Benzodiazepine Screen, Urine NEGATIVE NEGATIVE    Cocaine Metabolite, Urine NEGATIVE NEGATIVE    Methadone Screen, Urine NEGATIVE NEGATIVE    Opiates, Urine NEGATIVE NEGATIVE    Phencyclidine, Urine NEGATIVE NEGATIVE    Propoxyphene, Urine NEGATIVE NEGATIVE    Cannabinoid Scrn, Ur NEGATIVE NEGATIVE    Oxycodone Screen, Ur NEGATIVE NEGATIVE    Methamphetamine, Urine NEGATIVE NEGATIVE    Tricyclic Antidepressants, Urine NEGATIVE NEGATIVE    Buprenorphine Urine NEGATIVE NEGATIVE   CBC auto differential   Result Value Ref Range    WBC 6.4 3.5 - 11.3 k/uL    RBC 4.06 3.95 - 5.11 m/uL    Hemoglobin 13.5 11.9 - 15.1 g/dL    Hematocrit 38.5 36.3 - 47.1 %    MCV 94.8 82.6 - 102.9 fL    MCH 33.3 25.2 - 33.5 pg    MCHC 35.1 (H) 28.4 - 34.8 g/dL    RDW 13.4 11.8 - 14.4 %    Platelets 326 277 - 024 k/uL    MPV 11.3 8.1 - 13.5 fL    NRBC Automated 0.0 0.0 per 100 WBC    Seg Neutrophils 57 36 - 65 %    Lymphocytes 32 24 - 43 %    Monocytes 10 3 - 12 %    Eosinophils % 1 1 - 4 %    Basophils 0 0 - 2 %    Immature Granulocytes 0 0 %    Segs Absolute 3.63 1.50 - 8.10 k/uL    Absolute Lymph # 2.04 1.10 - 3.70 k/uL    Absolute Mono # 0.65 0.10 - 1.20 k/uL    Absolute Eos # 0.08 0.00 - 0.44 k/uL    Basophils Absolute <0.03 0.00 - 0.20 k/uL    Absolute Immature Granulocyte <0.03 0.00 - 0.30 k/uL         Postpartum complications: none    Discharge Medication:      Medication List        CONTINUE taking these medications      Prenatal Vitamin 27-0.8 MG Tabs            STOP taking these medications      acetaminophen 500 MG tablet  Commonly known as: TYLENOL     fish oil 1000 MG capsule     SUPER B COMPLEX/C PO     Vitamin D3 30 MCG/15ML Liqd                  Admit date: 10/11/2022  5:25 AM    Discharge Date: 10/13/2022    Discharged to: Home in stable condition        Plan:   Follow up in 6 week(s) for post partum visit, breast feeding check, and post partum depression check

## 2022-10-13 NOTE — DISCHARGE INSTRUCTIONS
does not help. BREAST CARE  Take medications as recommended by your doctor or midwife for pain  If you develop a warm, red, tender area on your breast or develop a fever contact your OB provider. For breastfeeding moms:  If you become engorged, feeding may be more difficult or painful for 1-2 days. You may find it helpful to hand express some milk so that the infant can latch on more easily. While breastfeeding, continue to take your prenatal vitamins as directed by your doctor or midwife. Refer to the breastfeeding booklet in the  folder/binder for more information. If you feel you need more assistance or have questions, please call Lexi Perez, lactation consultant, at [phone] or the OB department to schedule an appointment or phone consultation. For more FREE breastfeeding help, visit the Breastfeeding Support Group on Monday evenings at 7 pm in the [facility] department. For NON-breastfeeding moms:  You may apply ice packs to your breasts over your bra for twenty minutes at a time for comfort. Avoid stimulation to your breasts, when showering allow the water to strike your back not your breasts. Wear a good fitting bra until your milk dries, such as a sports bra. LAYNE CARE  Use the layne-bottle after toileting until bleeding stops. Cleanse your perineum from front to back  If used, stitches will dissolve in 4-6 weeks. You may use a sitz bath or soak in a clean tub as needed for comfort. Kegel exercises will help restore bladder control. SWELLING  Try to keep your legs elevated when you are sitting. When lying down keep your legs elevated. When wearing stocking or socks, make sure they are not too tight. WHEN TO CALL THE DOCTOR  If you have a temp of 100.6 or more. If your bleeding has increased and you are saturating a pad in an hour. Your abdomen is tender to touch. You are passing blood clots bigger than the size of a lemon.   If you are experiencing extreme weakness or dizziness. If you are having flu-like symptoms such as achy muscles or joints. There is a foul smell or a green color to your vaginal bleeding. If you have pain that cannot be relieved. You have persistent burning or frequency with urination. Call if you have concerns about your well-being. You are unable to sleep, eat, or are having thoughts of harming yourself or your baby. You have swelling, bleeding, drainage, foul odor, redness, or warmth in/around your incision or stitches. You have a red, warm, tender area in your calf.

## 2022-10-20 NOTE — L&D DELIVERY NOTE
Mother's Information      Labor Events     Labor?: No  Cervical Ripening:   Now               Paige Mancera [[id number]]      Labor Events     Labor?: No   Steroids?: None  Cervical Ripening Date/Time:     Antibiotics Received during Labor?: Yes  Rupture Identifier: Sac 1   Rupture Date/Time: 10/11/22 12:55:00   Rupture Type: AROM, Intact  Fluid Color: Clear  Fluid Odor: None  Fluid Volume: None  Induction: AROM, Oxytocin  Augmentation: None  Labor Complications: None       Anesthesia    Method: None       Start Pushing      Labor onset date/time:   Now     Dilation complete date/time: 10/11/22 15:30:00 Now     Start pushing date/time: 10/11/2022 15:29:00   Decision date/time (emergent ):           Labor Length    2nd stage: 0h 01m  3rd stage: 0h 07m       Delivery ()      Delivery Date/Time:  10/11/22 15:31:00   Delivery Type: Vaginal, Spontaneous    Details:             Presentation    Presentation: Vertex       Shoulder Dystocia    Shoulder Dystocia Present?: No  Add Second Maneuver  Add Third Maneuver  Add Fourth Maneuver  Add Fifth Maneuver  Add Sixth Maneuver  Add Seventh Maneuver  Add Eighth Maneuver  Add Ninth Maneuver       Assisted Delivery Details    Forceps Attempted?: No  Vacuum Extractor Attempted?: No       Document Additional Attempt         Document Additional Attempt                 Cord    Vessels: 3 Vessels  Complications: None  Delayed Cord Clamping?: Yes  Cord Clamped Date/Time: 10/11/2022 15:37:00  Cord Blood Disposition: Lab  Gases Sent?: No       Placenta    Date/Time: 10/11/2022 15:38:00  Removal: Spontaneous  Appearance: Intact  Disposition: Discarded       Lacerations    Episiotomy: None  Perineal Lacerations: 1st       Vaginal Counts    Initial Count Personnel: Donn Donohue LPN  Initial Count Verified By: James Stauffer RN    Sponges Needles Instruments   Initial Counts Correct  Correct   Final Counts Correct Correct Correct   Final Count Personnel: Christina Seals LPN  Final Count Verified By: Nicky Pink CNM  Accurate Final Count?: Yes  If the count is incorrect due to Intentionally Retained Foreign Object (IRFO) add the IRFO LDA in Lines/Drains. Add LDA: Link to [facility]       Blood Loss  Mother: Xavier Palomares #[id number]     Start of Mother's Information      Delivery Blood Loss  10/11/22 0331 - 10/12/22 1531      Quantitative Blood Loss (mL) Hospital Encounter 546 grams    Total  546 mL               End of Mother's Information  Mother: Xavier Palomares #[id number]                Delivery Providers    Delivering clinician: GERMANIA Parker - ROLAND     Provider Role     Obstetrician    Kimmy Ferreira, RN Primary Nurse     Primary  Nurse     NICU Nurse     Neonatologist     Anesthesiologist     Nurse Anesthetist     Nurse Practitioner     Midwife    Vedia Fleischer, RN Nursery Nurse    Alysha Chung, DAVID LPN               Assessment    Living Status: Living     Apgar Scoring Key:    0 1 2    Skin Color: Blue or pale Acrocyanotic Completely pink    Heart Rate: Absent <100 bpm >100 bpm    Reflex Irritability: No response Grimace Cry or active withdrawal    Muscle Tone: Limp Some flexion Active motion    Respiratory Effort: Absent Weak cry; hypoventilation Good, crying                      Skin Color:   Heart Rate:   Reflex Irritability:   Muscle Tone:   Respiratory Effort:    Total:            1 Minute:    0    2    2    2    2    8        Apgar 1 total from OB History    5 Minute:    1    2    2    2    2    9        Apgar 5 total from OB History    10 Minute:              15 Minute:              20 Minute:                        Apgars Assigned Richard Tovar RN              Resuscitation    Method: Bulb Suction, Stimulation              Measurements      Birth Weight: 3728 g Birth Length: 0.508 m     Head Circumference: 0.356 m               Title      Skin to Skin Initiation Date/Time: 10/11/22 15:35:00 EDT     Skin to Skin With: Mother Skin to Skin End Date/Time: 10/11/22 17:15:00

## 2022-10-28 ENCOUNTER — TELEPHONE (OUTPATIENT)
Dept: OBGYN | Age: 27
End: 2022-10-28

## 2022-10-28 NOTE — TELEPHONE ENCOUNTER
Post Partum Phone Call Vaginal delivery  Follow Up 2 weeks marylin after discharge      Date of service: 10/28/2022    Geneva Sanford  Is a 28 y.o. Delivery date 10/11    Type of delivery:  Spontaneous vaginal delivery    Laceration:Yes, First degree laceration. Suture used for repair:      Infant gender: male    Infant name: Judith Day     Are you breast or bottle feeding? Breast, going well    How did first pediatrician visit go: went great, re-checking hearing. How are you feeling: doing well. How is your bleeding: mostly done    Any questions or concerns: None. Are you on any medications for depression. no    Information given to pt. EPPDS:   Postpartum Depression Scale 10/28/2022   I have been able to laugh and see the funny side of things As much as I always could   I have looked forward with enjoyment to things As much as I ever did   I have blamed myself unnecessarily when things went wrong No, never   I have been anxious or worried for no good reason No, not at all   I have felt scared or panicky for no good reason No, not at all   I haven't been able to cope lately No, I have been coping as well as ever   I have been so unhappy that I have had difficulty sleeping Not at all   I have felt sad or miserable No, not at all   I have been so unhappy that I have been crying No, never   The thought of harming myself has occurred to me Never   Total 0       If above 10 schedule pt and appointment. Within a few days    Lets schedule your appt now -   Date - 11/21 @940A  What for -     History of thyroid issues? No  If yes order TSH with reflux to have done in lab within a week

## 2022-11-08 ENCOUNTER — OFFICE VISIT (OUTPATIENT)
Dept: PRIMARY CARE CLINIC | Age: 27
End: 2022-11-08
Payer: COMMERCIAL

## 2022-11-08 VITALS
TEMPERATURE: 98 F | SYSTOLIC BLOOD PRESSURE: 110 MMHG | HEART RATE: 82 BPM | DIASTOLIC BLOOD PRESSURE: 60 MMHG | BODY MASS INDEX: 30.65 KG/M2 | OXYGEN SATURATION: 94 % | HEIGHT: 63 IN | RESPIRATION RATE: 18 BRPM | WEIGHT: 173 LBS

## 2022-11-08 DIAGNOSIS — J06.9 ACUTE UPPER RESPIRATORY INFECTION: Primary | ICD-10-CM

## 2022-11-08 PROCEDURE — 99213 OFFICE O/P EST LOW 20 MIN: CPT | Performed by: FAMILY MEDICINE

## 2022-11-08 RX ORDER — AZITHROMYCIN 250 MG/1
250 TABLET, FILM COATED ORAL SEE ADMIN INSTRUCTIONS
Qty: 6 TABLET | Refills: 0 | Status: SHIPPED | OUTPATIENT
Start: 2022-11-08 | End: 2022-11-13

## 2022-11-08 NOTE — PROGRESS NOTES
Pharyngitis (Patient is here for headache, cough, sore throat that continues to worsen. She reports that is has been ongoing x3 days.  ) and Cough (Patient reports each morning she feels congestion but she is unable to bring up anything. She denies, fever, chills or fatigue.)         Allergies:  Patient has no known allergies. Past Medical History:    Past Medical History:   Diagnosis Date    Abnormal Pap smear of cervix     Anemia     Depression 09/15/2021    Endometriosis        Past Surgical History:    Past Surgical History:   Procedure Laterality Date    APPENDECTOMY      LAPAROSCOPY      x2    TONSILLECTOMY         Social History:   Social History     Tobacco Use    Smoking status: Never    Smokeless tobacco: Never   Substance Use Topics    Alcohol use: No       Family History:   Family History   Problem Relation Age of Onset    Other Other         no family h/o stroke or ovarian cancer     Breast Cancer Maternal Aunt     No Known Problems Mother     No Known Problems Father     No Known Problems Brother     No Known Problems Maternal Grandmother     Cirrhosis Maternal Grandfather     Cancer Paternal Grandfather         Bone    No Known Problems Brother     No Known Problems Brother          Review of Systems:  Constitutional: neg for fever , chills, neg for headache  Eyes: negative for visual disturbance   ENT: positive  for sore throat , positive nasal congestion, neg for ear pain  Respiratory: positive for cough, neg for shortness of breath neg for sputum   Cardiovascular: negative for chest pain,pnd or palpitations  Gastrointestinal: negative for abd pain, nausea, vomiting, diarrhea or constipation  Integument/breast: negative for skin rash or lesions  Neurological: negative for unilateral weakness, numbness or tingling. No joint pain,bodyache       Objective:  Physical Exam:  GEN:   A & O x3, no apparent distress  EYES: No gross abnormalities.   ENT:right and left TM normal without fluid or infection, neck without nodes, pharynx erythematous without exudate, and nasal mucosa congested  NECK: normal, supple, no lymphadenopathy,    PULM: clear to auscultation bilaterally- no wheezes, rales or rhonchi, normal air movement, no respiratory distress  COR: regular rate & rhythm and no gallops  EXT: Extremities: + 2 pedal pulses, no edema or calf tenderness, and warm to touch. Normal nails without lesions  SKIN:  No skin lesions or rashes        Assessment:  1. Acute upper respiratory infection          Plan:  1. Acute upper respiratory infection      Encouraged increased oral fluids  May use OTC meds:    Tylenol po q6 hrs PRN fever   Robitussin prn No orders of the defined types were placed in this encounter. Return if symptoms worsen or fail to improve.    Orders Placed This Encounter   Medications    azithromycin (ZITHROMAX) 250 MG tablet     Sig: Take 1 tablet by mouth See Admin Instructions for 5 days 500mg on day 1 followed by 250mg on days 2 - 5     Dispense:  6 tablet     Refill:  0         Electronically signed by Song Zafar MD on 11/8/2022 at 4:47 PM

## 2022-11-08 NOTE — PATIENT INSTRUCTIONS
SURVEY:    You may be receiving a survey from [employer] regarding your visit today. You may get this in the mail, through your MyChart, or in your email. Please complete the survey to enable us to provide the highest quality of care to you and your family. If you cannot score us a very good (5 Stars) on any question, please call the office to discuss how we could of made your experience exceptional.    Thank you!     Dr. Gregg Dey, LPHARSHAL John RN   [facility], [address] [place] [address]    Phone: [phone]  Fax: [phone]    Office Hours:   Lillie Garcias, [address], F: 8-5

## 2022-11-21 ENCOUNTER — POSTPARTUM VISIT (OUTPATIENT)
Dept: OBGYN | Age: 27
End: 2022-11-21
Payer: COMMERCIAL

## 2022-11-21 VITALS
BODY MASS INDEX: 26.79 KG/M2 | WEIGHT: 151.2 LBS | SYSTOLIC BLOOD PRESSURE: 122 MMHG | DIASTOLIC BLOOD PRESSURE: 64 MMHG | HEIGHT: 63 IN

## 2022-11-21 DIAGNOSIS — N81.0 URETHROCELE: ICD-10-CM

## 2022-11-21 LAB — HGB, POC: 14.8

## 2022-11-21 PROCEDURE — 0503F POSTPARTUM CARE VISIT: CPT | Performed by: ADVANCED PRACTICE MIDWIFE

## 2022-11-21 PROCEDURE — 85018 HEMOGLOBIN: CPT | Performed by: ADVANCED PRACTICE MIDWIFE

## 2022-11-21 NOTE — PROGRESS NOTES
POSTPARTUM EXAM    Date of service: 2022    Chayito Nicholas  Is a 28 y.o.  female    PT's PCP is: Song Zafar MD     : 1995     OB History    Para Term  AB Living   3 2 2 0 1 2   SAB IAB Ectopic Molar Multiple Live Births   1 0 0 0 0 2      # Outcome Date GA Lbr Abraham/2nd Weight Sex Delivery Anes PTL Lv   3 Term 10/11/22 40w0d / 00:01 8 lb 3.5 oz (3.728 kg) M Vag-Spont None N AIMEE   2 Term 20 40w5d 04:29 / 00:15 7 lb 8.4 oz (3.413 kg) F Vag-Spont None N AIMEE      Complications: Post-dates pregnancy   1 SAB 2016 6w0d    SAB   ND        Social History     Tobacco Use   Smoking Status Never   Smokeless Tobacco Never         Social History     Substance and Sexual Activity   Alcohol Use No         Delivery date 10/11/2022    Type of delivery:  Spontaneous vaginal delivery    Laceration:Yes,     Infant gender: male    Infant name: Verner Pines    Are you breast or bottle feeding? Breast    Have you been sexually active since delivery? No    Vital Signs: Blood pressure 122/64, height 5' 3\" (1.6 m), weight 151 lb 3.2 oz (68.6 kg), last menstrual period 2022, currently breastfeeding. Labs:    Blood Type and Rh: A POSITIVE          Allergies: Patient has no known allergies. Current Outpatient Medications:     Prenatal Vit-Fe Fumarate-FA (PRENATAL VITAMIN) 27-0.8 MG TABS, Take 1 tablet by mouth, Disp: , Rfl:     Last Yearly:  09/15/2021    Last pap: 09/15/2021    Last HPV: never    Chief Complaint   Patient presents with    Postpartum Care     Postpartum exam. Patient had vaginal delivery 10/11/2022. Patient is breastfeeding. Last pap 09/15/2021 negative.          How many Hours of sleep do you get a night: 9-10    Do you have a normal appetite: yes    Any problems or pain: check stitches    Do you feel like you coping well: yes    Is baby sleeping:good    How is baby eating: good    How did first pediatrician visit go: good    EPPDS:   Postpartum Depression Scale 11/21/2022   I have been able to laugh and see the funny side of things As much as I always could   I have looked forward with enjoyment to things As much as I ever did   I have blamed myself unnecessarily when things went wrong No, never   I have been anxious or worried for no good reason No, not at all   I have felt scared or panicky for no good reason No, not at all   I haven't been able to cope lately No, I have been coping as well as ever   I have been so unhappy that I have had difficulty sleeping Not at all   I have felt sad or miserable No, not at all   I have been so unhappy that I have been crying No, never   The thought of harming myself has occurred to me Never   Total 0         No results found for this visit on 11/21/22. History of gestational diabetes? No  If yes order 2 hr GTT    Nurse: Joan HERNANDEZ    HPI:  PT presents for Post partum exam Follow up 6 weeks after delivery. Objective   No acute distress  Excellent communications  Well-nourished    Breasts: Breast:lactating   Pelvic Exam: GENITAL/URINARY:  External Genitalia:  General appearance; normal, Hair distribution; normal, Lesions absent, well healed, patient points to area \"never felt before\" just inside introitus at 12 oclock, no lesion but minor urethrocele noted                                      Assessment and Plan          Diagnosis Orders   1. Postpartum care and examination  POCT hemoglobin      2. Urethrocele            reviewed kegel exercises      I am having Delilah Durant maintain her Prenatal Vitamin. Return in about 6 months (around 5/21/2023) for yearly. There are no Patient Instructions on file for this visit.     Time spent 30 minutes      Santosh Mak [address] [place] [address] 10:16 AM

## 2023-01-20 ENCOUNTER — HOSPITAL ENCOUNTER (OUTPATIENT)
Age: 28
Discharge: HOME | End: 2023-01-20
Payer: COMMERCIAL

## 2023-01-20 ENCOUNTER — OFFICE VISIT (OUTPATIENT)
Dept: PRIMARY CARE CLINIC | Age: 28
End: 2023-01-20
Payer: COMMERCIAL

## 2023-01-20 VITALS
HEART RATE: 82 BPM | RESPIRATION RATE: 16 BRPM | BODY MASS INDEX: 26.75 KG/M2 | SYSTOLIC BLOOD PRESSURE: 118 MMHG | DIASTOLIC BLOOD PRESSURE: 71 MMHG | WEIGHT: 151 LBS

## 2023-01-20 DIAGNOSIS — R51.9 HEADACHE, UNSPECIFIED HEADACHE TYPE: ICD-10-CM

## 2023-01-20 DIAGNOSIS — R42 DIZZINESS AND GIDDINESS: Primary | ICD-10-CM

## 2023-01-20 DIAGNOSIS — R42 DIZZINESS AND GIDDINESS: ICD-10-CM

## 2023-01-20 LAB
ABSOLUTE EOS #: 0.12 K/UL (ref 0–0.44)
ABSOLUTE IMMATURE GRANULOCYTE: <0.03 K/UL (ref 0–0.3)
ABSOLUTE LYMPH #: 2.39 K/UL (ref 1.1–3.7)
ABSOLUTE MONO #: 0.56 K/UL (ref 0.1–1.2)
ALBUMIN SERPL-MCNC: 4.5 G/DL (ref 3.5–5.2)
ALBUMIN/GLOBULIN RATIO: 1.5 (ref 1–2.5)
ALP BLD-CCNC: 73 U/L (ref 35–104)
ALT SERPL-CCNC: 26 U/L (ref 5–33)
ANION GAP SERPL CALCULATED.3IONS-SCNC: 6 MMOL/L (ref 9–17)
AST SERPL-CCNC: 18 U/L
BASOPHILS # BLD: 1 % (ref 0–2)
BASOPHILS ABSOLUTE: 0.04 K/UL (ref 0–0.2)
BILIRUB SERPL-MCNC: 0.5 MG/DL (ref 0.3–1.2)
BUN BLDV-MCNC: 13 MG/DL (ref 6–20)
BUN/CREAT BLD: 25 (ref 9–20)
CALCIUM SERPL-MCNC: 10.3 MG/DL (ref 8.6–10.4)
CHLORIDE BLD-SCNC: 103 MMOL/L (ref 98–107)
CO2: 31 MMOL/L (ref 20–31)
CREAT SERPL-MCNC: 0.51 MG/DL (ref 0.5–0.9)
EOSINOPHILS RELATIVE PERCENT: 2 % (ref 1–4)
GFR SERPL CREATININE-BSD FRML MDRD: >60 ML/MIN/1.73M2
GLUCOSE BLD-MCNC: 88 MG/DL (ref 70–99)
HCT VFR BLD CALC: 40.4 % (ref 36.3–47.1)
HEMOGLOBIN: 13.9 G/DL (ref 11.9–15.1)
IMMATURE GRANULOCYTES: 0 %
LYMPHOCYTES # BLD: 42 % (ref 24–43)
MCH RBC QN AUTO: 32.6 PG (ref 25.2–33.5)
MCHC RBC AUTO-ENTMCNC: 34.4 G/DL (ref 28.4–34.8)
MCV RBC AUTO: 94.6 FL (ref 82.6–102.9)
MONOCYTES # BLD: 10 % (ref 3–12)
NRBC AUTOMATED: 0 PER 100 WBC
PDW BLD-RTO: 12.6 % (ref 11.8–14.4)
PLATELET # BLD: 321 K/UL (ref 138–453)
PMV BLD AUTO: 9.8 FL (ref 8.1–13.5)
POTASSIUM SERPL-SCNC: 4.5 MMOL/L (ref 3.7–5.3)
RBC # BLD: 4.27 M/UL (ref 3.95–5.11)
SEG NEUTROPHILS: 45 % (ref 36–65)
SEGMENTED NEUTROPHILS ABSOLUTE COUNT: 2.63 K/UL (ref 1.5–8.1)
SODIUM BLD-SCNC: 140 MMOL/L (ref 135–144)
TOTAL PROTEIN: 7.6 G/DL (ref 6.4–8.3)
TSH SERPL DL<=0.05 MIU/L-ACNC: 1.88 UIU/ML (ref 0.3–5)
WBC # BLD: 5.8 K/UL (ref 3.5–11.3)

## 2023-01-20 PROCEDURE — 85025 COMPLETE CBC W/AUTO DIFF WBC: CPT

## 2023-01-20 PROCEDURE — 36415 COLL VENOUS BLD VENIPUNCTURE: CPT

## 2023-01-20 PROCEDURE — 84443 ASSAY THYROID STIM HORMONE: CPT

## 2023-01-20 PROCEDURE — 99213 OFFICE O/P EST LOW 20 MIN: CPT | Performed by: FAMILY MEDICINE

## 2023-01-20 PROCEDURE — 80053 COMPREHEN METABOLIC PANEL: CPT

## 2023-01-20 ASSESSMENT — PATIENT HEALTH QUESTIONNAIRE - PHQ9
3. TROUBLE FALLING OR STAYING ASLEEP: 0
2. FEELING DOWN, DEPRESSED OR HOPELESS: 0
8. MOVING OR SPEAKING SO SLOWLY THAT OTHER PEOPLE COULD HAVE NOTICED. OR THE OPPOSITE, BEING SO FIGETY OR RESTLESS THAT YOU HAVE BEEN MOVING AROUND A LOT MORE THAN USUAL: 0
9. THOUGHTS THAT YOU WOULD BE BETTER OFF DEAD, OR OF HURTING YOURSELF: 0
SUM OF ALL RESPONSES TO PHQ QUESTIONS 1-9: 0
4. FEELING TIRED OR HAVING LITTLE ENERGY: 0
6. FEELING BAD ABOUT YOURSELF - OR THAT YOU ARE A FAILURE OR HAVE LET YOURSELF OR YOUR FAMILY DOWN: 0
1. LITTLE INTEREST OR PLEASURE IN DOING THINGS: 0
SUM OF ALL RESPONSES TO PHQ QUESTIONS 1-9: 0
10. IF YOU CHECKED OFF ANY PROBLEMS, HOW DIFFICULT HAVE THESE PROBLEMS MADE IT FOR YOU TO DO YOUR WORK, TAKE CARE OF THINGS AT HOME, OR GET ALONG WITH OTHER PEOPLE: 0
5. POOR APPETITE OR OVEREATING: 0
SUM OF ALL RESPONSES TO PHQ QUESTIONS 1-9: 0
SUM OF ALL RESPONSES TO PHQ QUESTIONS 1-9: 0
SUM OF ALL RESPONSES TO PHQ9 QUESTIONS 1 & 2: 0
7. TROUBLE CONCENTRATING ON THINGS, SUCH AS READING THE NEWSPAPER OR WATCHING TELEVISION: 0

## 2023-01-20 NOTE — PATIENT INSTRUCTIONS
SURVEY:    You may be receiving a survey from [facility] regarding your visit today.    You may get this in the mail, through your MyChart, or in your email.     Please complete the survey to enable us to provide the highest quality of care to you and your family.    If you cannot score us a very good (5 Stars) on any question, please call the office to discuss how we could of made your experience exceptional.    Thank you!    DAVID Avila RN Kendra, ADRIAN Lopez LPN    Phone: [phone]  Fax: [phone]    Office Hours:   M, T, TH, F: 8-5

## 2023-01-20 NOTE — PROGRESS NOTES
Patient is here with complaints of dizziness, and nausea. She said that the dizziness has been going on for the past few weeks, and the nausea began on Sunday. She said today is the first day she has not had the nausea. She said that it does not happen all the time, but it does come and go. She is not currently taking any medication for treatment. She is requesting to get lab work completed. Patient states that during her last birth her urethra prolapsed. She said that she does go to the bathroom more often than before. CURRENT ALLERGIES: Patient has no known allergies. PAST MEDICAL HISTORY:   Past Medical History:   Diagnosis Date    Abnormal Pap smear of cervix     Anemia     Depression 09/15/2021    Endometriosis        SURGICAL HISTORY:   Past Surgical History:   Procedure Laterality Date    APPENDECTOMY      LAPAROSCOPY      x2    TONSILLECTOMY         FAMILY HISTORY:   Family History   Problem Relation Age of Onset    Other Other         no family h/o stroke or ovarian cancer     Breast Cancer Maternal Aunt     No Known Problems Mother     No Known Problems Father     No Known Problems Brother     No Known Problems Maternal Grandmother     Cirrhosis Maternal Grandfather     Cancer Paternal Grandfather         Bone    No Known Problems Brother     No Known Problems Brother        SOCIAL HISTORY:   Social History     Tobacco Use    Smoking status: Never    Smokeless tobacco: Never   Vaping Use    Vaping Use: Never used   Substance Use Topics    Alcohol use: No    Drug use: No     Prior to Admission medications    Medication Sig Start Date End Date Taking?  Authorizing Provider   Cyanocobalamin (B-12 PO) Take by mouth   Yes Historical Provider, MD   Prenatal Vit-Fe Fumarate-FA (PRENATAL VITAMIN) 27-0.8 MG TABS Take 1 tablet by mouth   Yes Historical Provider, MD       Review of Systems:  Constitutional: negative for fevers or chills,has diffuse headache and dizziness associated with nausea  Eyes: negative for visual disturbance   ENT: negative for sore throat or nasal congestion  Respiratory: negative for shortness of breath or cough  Cardiovascular: negative for chest pain ,palpitations,pnd,syncope  Gastrointestinal: negative for abd pain, positive for nausea,neg for  vomiting, diarrhea , constipation,hemetemesis,derrek,blood in stool  Genitourinary: negative for dysuria, urgency ,frequency,hematuria  Integument/breast: negative for skin rash or lesions  Neurological: negative for unilateral weakness, numbness or tingling. Skeletal Muscular: no joint pain,jont swelling,back pain    Subjective:  Vitals:    01/20/23 1337   BP: 118/71   Pulse: 82   Resp: 16         Exam:  GEN:   A & O x3, no apparent distress  EYES: No gross abnormalities. and PERRL  ENT:ENT exam normal, no neck nodes or sinus tenderness  NECK: normal, supple, no lymphadenopathy,  no carotid bruits  PULM: clear to auscultation bilaterally- no wheezes, rales or rhonchi, normal air movement, no respiratory distress  COR: regular rate & rhythm, no murmurs, and no gallops  ABD:  soft, non-tender, non-distended, normal bowel sounds, no masses or organomegaly  : deferred  EXT: Extremities: + 2 pedal pulses, no edema or calf tenderness, and warm to touch. Normal nails without lesions  Skeletomuscular:  NEURO: Motor and sensory grossly intact  SKIN:  No skin lesions or rashes      Assessment:  1. Dizziness and giddiness    2. Headache, unspecified headache type        Plan:  Prn tylenol  Discussed hydrtion  Orders Placed This Encounter   Procedures    CBC with Auto Differential     Standing Status:   Future     Standing Expiration Date:   1/20/2024    Comprehensive Metabolic Panel     Standing Status:   Future     Standing Expiration Date:   1/20/2024    TSH     Standing Status:   Future     Standing Expiration Date:   1/20/2024     No follow-ups on file. No orders of the defined types were placed in this encounter.     Medication directions and side effects discussed      Electronically signed by Uma Hollis MD on 1/20/2023 at 2:02 PM         Uma Hollis MD, MD

## 2023-01-24 ENCOUNTER — TELEPHONE (OUTPATIENT)
Dept: PRIMARY CARE CLINIC | Age: 28
End: 2023-01-24

## 2023-01-24 DIAGNOSIS — R79.89 ABNORMAL BUN-TO-CREATININE RATIO: Primary | ICD-10-CM

## 2023-02-13 ENCOUNTER — HOSPITAL ENCOUNTER (OUTPATIENT)
Age: 28
Setting detail: SPECIMEN
Discharge: HOME | End: 2023-02-13
Payer: COMMERCIAL

## 2023-02-13 ENCOUNTER — OFFICE VISIT (OUTPATIENT)
Dept: UROLOGY | Age: 28
End: 2023-02-13
Payer: COMMERCIAL

## 2023-02-13 VITALS
BODY MASS INDEX: 27.46 KG/M2 | WEIGHT: 155 LBS | TEMPERATURE: 97.5 F | DIASTOLIC BLOOD PRESSURE: 79 MMHG | SYSTOLIC BLOOD PRESSURE: 116 MMHG | HEART RATE: 89 BPM | HEIGHT: 63 IN

## 2023-02-13 DIAGNOSIS — R39.14 FEELING OF INCOMPLETE BLADDER EMPTYING: Primary | ICD-10-CM

## 2023-02-13 DIAGNOSIS — N81.0 URETHROCELE, FEMALE: ICD-10-CM

## 2023-02-13 DIAGNOSIS — R39.14 FEELING OF INCOMPLETE BLADDER EMPTYING: ICD-10-CM

## 2023-02-13 LAB
BACTERIA: ABNORMAL
BILIRUBIN URINE: NEGATIVE
COLOR: YELLOW
EPITHELIAL CELLS UA: ABNORMAL /HPF (ref 0–25)
GLUCOSE UR STRIP.AUTO-MCNC: NEGATIVE MG/DL
KETONES UR STRIP.AUTO-MCNC: NEGATIVE MG/DL
LEUKOCYTE ESTERASE UR QL STRIP.AUTO: NEGATIVE
NITRITE UR QL STRIP.AUTO: NEGATIVE
PROT UR STRIP.AUTO-MCNC: 7 MG/DL (ref 5–9)
PROT UR STRIP.AUTO-MCNC: NEGATIVE MG/DL
RBC CLUMPS #/AREA URNS AUTO: ABNORMAL /HPF (ref 0–2)
SPECIFIC GRAVITY UA: 1.02 (ref 1.01–1.02)
TURBIDITY: ABNORMAL
URINE HGB: ABNORMAL
UROBILINOGEN, URINE: NORMAL
WBC UA: ABNORMAL /HPF (ref 0–5)

## 2023-02-13 PROCEDURE — 99204 OFFICE O/P NEW MOD 45 MIN: CPT | Performed by: UROLOGY

## 2023-02-13 PROCEDURE — 87086 URINE CULTURE/COLONY COUNT: CPT

## 2023-02-13 PROCEDURE — 86403 PARTICLE AGGLUT ANTBDY SCRN: CPT

## 2023-02-13 PROCEDURE — 51798 US URINE CAPACITY MEASURE: CPT | Performed by: UROLOGY

## 2023-02-13 PROCEDURE — 81001 URINALYSIS AUTO W/SCOPE: CPT

## 2023-02-13 ASSESSMENT — ENCOUNTER SYMPTOMS
EYE REDNESS: 0
CONSTIPATION: 0
VOMITING: 0
ABDOMINAL PAIN: 0
COLOR CHANGE: 0
NAUSEA: 0
APNEA: 0
WHEEZING: 0
SHORTNESS OF BREATH: 0
COUGH: 0
BACK PAIN: 0

## 2023-02-13 NOTE — PATIENT INSTRUCTIONS
SURVEY:    You may be receiving a survey from [employer] regarding your visit today. Please complete the survey to enable us to provide the highest quality of care to you and your family. If you cannot score us a very good on any question, please call the office to discuss how we could have made your experience a very good one. Thank you.

## 2023-02-13 NOTE — PROGRESS NOTES
HPI:        Patient is a 28 y.o. female in no acute distress. She is alert and oriented to person, place, and time. Patient is here today as a new patient. Patient was referred by Dr. Coy Mitchell. Patient was sent to us for abnormal lab work. Patient has never seen urology before. She does report voiding approximately 1-2 times at night. She does have a bowel movement approximately every other day. She has had worsening urgency and frequency for about the last 4 months. She has no current urinary incontinence. Patient does have a history of having frequent urinary tract infections. She also has feelings of incomplete emptiness. She does think that her bladder has fallen since her most previous delivery. Patient was able to void 50 mL today. Postvoid residual was 123 mL. Patient was recently seen in the gynecology department. She was told she has a urethrocele. She does state that she can feel something bulging down there. She did not notice this until after her most recent delivery. Patient is having no baseline pain. She does have some discomfort. Past Medical History:   Diagnosis Date    Abnormal Pap smear of cervix     Anemia     Depression 09/15/2021    Endometriosis      Past Surgical History:   Procedure Laterality Date    APPENDECTOMY      LAPAROSCOPY      x2    TONSILLECTOMY       Outpatient Encounter Medications as of 2/13/2023   Medication Sig Dispense Refill    Cyanocobalamin (B-12 PO) Take by mouth      Prenatal Vit-Fe Fumarate-FA (PRENATAL VITAMIN) 27-0.8 MG TABS Take 1 tablet by mouth       No facility-administered encounter medications on file as of 2/13/2023. Current Outpatient Medications on File Prior to Visit   Medication Sig Dispense Refill    Cyanocobalamin (B-12 PO) Take by mouth      Prenatal Vit-Fe Fumarate-FA (PRENATAL VITAMIN) 27-0.8 MG TABS Take 1 tablet by mouth       No current facility-administered medications on file prior to visit.      Patient has no known allergies. Family History   Problem Relation Age of Onset    Other Other         no family h/o stroke or ovarian cancer     Breast Cancer Maternal Aunt     No Known Problems Mother     No Known Problems Father     No Known Problems Brother     No Known Problems Maternal Grandmother     Cirrhosis Maternal Grandfather     Cancer Paternal Grandfather         Bone    No Known Problems Brother     No Known Problems Brother      Social History     Tobacco Use   Smoking Status Never   Smokeless Tobacco Never       Social History     Substance and Sexual Activity   Alcohol Use No       Review of Systems   Constitutional:  Negative for appetite change, chills and fever. Eyes:  Negative for redness and visual disturbance. Respiratory:  Negative for apnea, cough, shortness of breath and wheezing. Cardiovascular:  Negative for chest pain and leg swelling. Gastrointestinal:  Negative for abdominal pain, constipation, nausea and vomiting. Genitourinary:  Positive for difficulty urinating. Negative for dyspareunia, dysuria, enuresis, flank pain, frequency, hematuria, pelvic pain, urgency, vaginal bleeding and vaginal discharge. Musculoskeletal:  Negative for back pain, joint swelling and myalgias. Skin:  Negative for color change, rash and wound. Neurological:  Negative for dizziness, tremors and numbness. Hematological:  Negative for adenopathy. Does not bruise/bleed easily. Psychiatric/Behavioral:  Negative for sleep disturbance. /79 (Site: Right Upper Arm, Position: Sitting, Cuff Size: Medium Adult)   Pulse 89   Temp 97.5 °F (36.4 °C) (Infrared)   Ht 5' 3\" (1.6 m)   Wt 155 lb (70.3 kg)   LMP 02/13/2023 (Exact Date)   BMI 27.46 kg/m²       PHYSICAL EXAM:  Constitutional: Patient resting comfortably, in no acute distress. Neuro: Alert and oriented to person place and time. Cranial nerves grossly intact.     Psych: Mood and affect normal.  Skin: Warm, dry  HEENT: normocephalic, atraumatic  Lymphatics: No palpable lymphadenopathy  Lungs: Respiratory effort normal, unlabored  Cardiovascular:  Normal peripheral pulses  Abdomen: Soft, non-tender, non-distended with no organomegaly or palpable masses. : No CVA tenderness. Bladder non-tender and not distended. Pelvic: deferred    Lab Results   Component Value Date    BUN 13 01/20/2023     Lab Results   Component Value Date    CREATININE 0.51 01/20/2023       ASSESSMENT:  This is a 28 y.o. female with the following diagnoses:   Diagnosis Orders   1. Feeling of incomplete bladder emptying        2. Urethrocele, female               PLAN:  We will plan for cystoscopy and pelvic exam.  We can further evaluate whether or not she has a urethrocele.

## 2023-02-14 ENCOUNTER — TELEPHONE (OUTPATIENT)
Dept: UROLOGY | Age: 28
End: 2023-02-14

## 2023-02-14 LAB
MICROORGANISM SPEC CULT: ABNORMAL
SPECIMEN DESCRIPTION: ABNORMAL

## 2023-02-14 RX ORDER — CEPHALEXIN 500 MG/1
500 CAPSULE ORAL 4 TIMES DAILY
Qty: 28 CAPSULE | Refills: 0 | Status: SHIPPED | OUTPATIENT
Start: 2023-02-14 | End: 2023-02-21

## 2023-02-14 NOTE — TELEPHONE ENCOUNTER
Urine culture is positive for infection. We sent in culture specific keflex. Take this antibiotic until gone. Take this with food and eat yogurt once per day to prevent GI upset. If you develop nausea, vomiting, or fevers call the office or go to the ER. If your urinary symptoms do not improve once completing the antibiotics call our office.

## 2023-03-03 ENCOUNTER — PROCEDURE VISIT (OUTPATIENT)
Dept: UROLOGY | Age: 28
End: 2023-03-03

## 2023-03-03 VITALS
TEMPERATURE: 98 F | HEART RATE: 96 BPM | BODY MASS INDEX: 27.29 KG/M2 | WEIGHT: 154 LBS | SYSTOLIC BLOOD PRESSURE: 115 MMHG | DIASTOLIC BLOOD PRESSURE: 76 MMHG | HEIGHT: 63 IN

## 2023-03-03 DIAGNOSIS — N81.0 URETHROCELE, FEMALE: ICD-10-CM

## 2023-03-03 DIAGNOSIS — N32.81 OVERACTIVE BLADDER: Primary | ICD-10-CM

## 2023-03-03 RX ORDER — OXYBUTYNIN CHLORIDE 5 MG/1
5 TABLET, EXTENDED RELEASE ORAL DAILY
Qty: 30 TABLET | Refills: 3 | Status: SHIPPED | OUTPATIENT
Start: 2023-03-03

## 2023-03-03 ASSESSMENT — ENCOUNTER SYMPTOMS
BACK PAIN: 0
VOMITING: 0
APNEA: 0
COLOR CHANGE: 0
ABDOMINAL PAIN: 0
CONSTIPATION: 0
WHEEZING: 0
EYE REDNESS: 0
COUGH: 0
NAUSEA: 0
SHORTNESS OF BREATH: 0

## 2023-03-03 NOTE — PROGRESS NOTES
During cystoscopy the following was utilized on patient with no adverse affects:    45% SODIUM CHLORIDE 500 ML BAG  Lot number: [id number]  Expiration date: 11/23      LIDOCAINE HYDROCHLORIDE JELLY 2%   Lot number: [id number]  Expiration date: 07/24

## 2023-03-03 NOTE — PROGRESS NOTES
HPI:        Patient is a 28 y.o. female in no acute distress. She is alert and oriented to person, place, and time. History  Patient is here today as a new patient. Patient was referred by Dr. Daniel Hugo. Patient was sent to us for abnormal lab work. Patient has never seen urology before. She does report voiding approximately 1-2 times at night. She does have a bowel movement approximately every other day. She has had worsening urgency and frequency for about the last 4 months. She has no current urinary incontinence. Patient does have a history of having frequent urinary tract infections. She also has feelings of incomplete emptiness. She does think that her bladder has fallen since her most previous delivery. Patient was able to void 50 mL today. Postvoid residual was 123 mL. Patient was recently seen in the gynecology department. She was told she has a urethrocele. She does state that she can feel something bulging down there. She did not notice this until after her most recent delivery. Patient is having no baseline pain. She does have some discomfort. Currently  Patient is here today for lower tract visualization. She is also going to have a pelvic examination. Patient was recently seen by gynecology. They did say that she had a urethrocele. Patient has had no new symptoms since last visit. No pain today. Cystoscopy Procedure Note    Pre-operative Diagnosis: urethrocele    Post-operative Diagnosis: Same     Surgeon: Chetan Fernandez    Assistants: None    Anesthesia : Local    Procedure Details   The risks, benefits, complications, treatment options, and expected outcomes were discussed with the patient. The patient concurred with the proposed plan, giving informed consent. Cystoscopy was performed today under local anesthesia, using sterile technique. The patient was placed in the dorsal lithotomy position, prepped with CHG, and draped in the usual sterile fashion.  A [address] [place] [address] flexible cystoscope was used to systematically inspect both the urethra and bladder in their entirety. Findings:  Anterior urethra: normal without strictures  Hyperplasia: na  Bladder: Normal mucosa, without lesions. Ureteral orifice(s) was/were seen in the normal position and effluxing clear urine  Trabeculations No  Diverticulum No  Description: normal anatomy         Specimens: Cytology/urine culture No                 Complications:  None; patient tolerated the procedure well. Disposition: home           Condition: stable     Past Medical History:   Diagnosis Date    Abnormal Pap smear of cervix     Anemia     Depression 09/15/2021    Endometriosis      Past Surgical History:   Procedure Laterality Date    APPENDECTOMY      LAPAROSCOPY      x2    TONSILLECTOMY       Outpatient Encounter Medications as of 3/3/2023   Medication Sig Dispense Refill    Cyanocobalamin (B-12 PO) Take by mouth      [DISCONTINUED] Prenatal Vit-Fe Fumarate-FA (PRENATAL VITAMIN) 27-0.8 MG TABS Take 1 tablet by mouth (Patient not taking: Reported on 3/3/2023)       No facility-administered encounter medications on file as of 3/3/2023. Current Outpatient Medications on File Prior to Visit   Medication Sig Dispense Refill    Cyanocobalamin (B-12 PO) Take by mouth       No current facility-administered medications on file prior to visit. Patient has no known allergies.   Family History   Problem Relation Age of Onset    Other Other         no family h/o stroke or ovarian cancer     Breast Cancer Maternal Aunt     No Known Problems Mother     No Known Problems Father     No Known Problems Brother     No Known Problems Maternal Grandmother     Cirrhosis Maternal Grandfather     Cancer Paternal Grandfather         Bone    No Known Problems Brother     No Known Problems Brother      Social History     Tobacco Use   Smoking Status Never   Smokeless Tobacco Never       Social History     Substance and Sexual Activity   Alcohol Use No       Review of Systems   Constitutional:  Negative for appetite change, chills and fever. Eyes:  Negative for redness and visual disturbance. Respiratory:  Negative for apnea, cough, shortness of breath and wheezing. Cardiovascular:  Negative for chest pain and leg swelling. Gastrointestinal:  Negative for abdominal pain, constipation, nausea and vomiting. Genitourinary:  Positive for frequency and urgency. Negative for difficulty urinating, dyspareunia, dysuria, enuresis, flank pain, hematuria, pelvic pain, vaginal bleeding and vaginal discharge. Musculoskeletal:  Negative for back pain, joint swelling and myalgias. Skin:  Negative for color change, rash and wound. Neurological:  Negative for dizziness, tremors and numbness. Hematological:  Negative for adenopathy. Does not bruise/bleed easily. Psychiatric/Behavioral:  Negative for sleep disturbance. /76 (Site: Right Upper Arm, Position: Sitting, Cuff Size: Medium Adult)   Pulse 96   Temp 98 °F (36.7 °C)   Ht 5' 3\" (1.6 m)   Wt 154 lb (69.9 kg)   LMP 02/13/2023 (Exact Date)   BMI 27.28 kg/m²       PHYSICAL EXAM:  Constitutional: Patient resting comfortably, in no acute distress. Neuro: Alert and oriented to person place and time. Cranial nerves grossly intact. Psych: Mood and affect normal.  Skin: Warm, dry  HEENT: normocephalic, atraumatic  Lymphatics: No palpable lymphadenopathy  Lungs: Respiratory effort normal, unlabored  Cardiovascular:  Normal peripheral pulses  Abdomen: Soft, non-tender, non-distended with no organomegaly or palpable masses. : No CVA tenderness. Bladder non-tender and not distended. Pelvic: deferred    Lab Results   Component Value Date    BUN 13 01/20/2023     Lab Results   Component Value Date    CREATININE 0.51 01/20/2023       ASSESSMENT:  This is a 28 y.o. female with the following diagnoses:   Diagnosis Orders   1.  Overactive bladder  oxybutynin (DITROPAN XL) 5 MG extended release tablet             PLAN:  At this point in time I do feel that the patient has some excess tissue in the anterior vaginal wall. I do not feel like the structure is cystic. This is causing minimal symptoms. Patient does have frequency. We did start her on a small dose of an anticholinergic. We did discuss bladder irritants today. We also recommended MiraLAX for bowel movements. Patient will follow up with us in approximately 6 weeks.

## 2023-03-03 NOTE — PATIENT INSTRUCTIONS
SURVEY:    You may be receiving a survey from [employer] regarding your visit today. Please complete the survey to enable us to provide the highest quality of care to you and your family. If you cannot score us a very good on any question, please call the office to discuss how we could have made your experience a very good one. Thank you.

## 2023-04-13 PROBLEM — N32.81 OVERACTIVE BLADDER: Status: ACTIVE | Noted: 2023-04-13

## 2023-05-25 ENCOUNTER — OFFICE VISIT (OUTPATIENT)
Dept: UROLOGY | Age: 28
End: 2023-05-25
Payer: COMMERCIAL

## 2023-05-25 VITALS
HEART RATE: 83 BPM | BODY MASS INDEX: 26.75 KG/M2 | TEMPERATURE: 98.6 F | HEIGHT: 63 IN | SYSTOLIC BLOOD PRESSURE: 118 MMHG | WEIGHT: 151 LBS | DIASTOLIC BLOOD PRESSURE: 77 MMHG

## 2023-05-25 DIAGNOSIS — R39.14 FEELING OF INCOMPLETE BLADDER EMPTYING: ICD-10-CM

## 2023-05-25 DIAGNOSIS — K59.00 CONSTIPATION, UNSPECIFIED CONSTIPATION TYPE: ICD-10-CM

## 2023-05-25 DIAGNOSIS — N32.81 OVERACTIVE BLADDER: Primary | ICD-10-CM

## 2023-05-25 PROCEDURE — 99214 OFFICE O/P EST MOD 30 MIN: CPT | Performed by: NURSE PRACTITIONER

## 2023-05-25 PROCEDURE — 51798 US URINE CAPACITY MEASURE: CPT | Performed by: NURSE PRACTITIONER

## 2023-05-25 RX ORDER — TROSPIUM CHLORIDE ER 60 MG/1
60 CAPSULE ORAL DAILY
Qty: 30 CAPSULE | Refills: 2 | Status: SHIPPED | OUTPATIENT
Start: 2023-05-25

## 2023-05-25 RX ORDER — POLYETHYLENE GLYCOL 3350 17 G/17G
17 POWDER, FOR SOLUTION ORAL DAILY
Qty: 1530 G | Refills: 3 | Status: SHIPPED | OUTPATIENT
Start: 2023-05-25 | End: 2023-06-24

## 2023-05-25 RX ORDER — DOCUSATE SODIUM 100 MG/1
100 CAPSULE, LIQUID FILLED ORAL NIGHTLY
COMMUNITY
End: 2023-05-25

## 2023-05-25 NOTE — PATIENT INSTRUCTIONS
It is very important to have regular, daily, bowel movements because this will improve urinary symptoms. Take Miralax (or generic equivalent) 17 g once daily (one capful). Do not skip days. If 1 dose daily causes loose stools/diarrhea, decrease to 1/2 dose or 1/4 dose but be sure to continue taking it daily (it is not the type of medication that you can just use \"as needed,\" must be taken daily to be effective). Drink 80 ounces of water every day    SURVEY:    You may be receiving a survey from [employer] regarding your visit today. Please complete the survey to enable us to provide the highest quality of care to you and your family. If you cannot score us a very good on any question, please call the office to discuss how we could have made your experience a very good one. Thank you.

## 2023-05-25 NOTE — PROGRESS NOTES
HPI:        Patient is a 33 y.o. female in no acute distress. She is alert and oriented to person, place, and time. History  Worsening frequency and urgency over the last several months. Nocturia 1-2 times at night. She does have a bowel movement approximately every other day. Denies urinary incontinence. History of having frequent urinary tract infections. She also has feelings of incomplete bladder emptying. Was told by gynecology she has a urethrocele. She does state that she can feel something \"bulging down there\". She did not notice this until after her most recent delivery. Patient is having no baseline pain. She does have some discomfort. 3/2023 cystoscopy and pelvic exam showed no significant abnormality   Started oxybutynin 5mg     Miralax daily    4/2023 continued urgency and frequency  increased oxybutynin to 10mg    Today  Here today to follow-up for overactive bladder and constipation. Since increasing the oxybutynin she is able to sleep through the night. She denies any daytime frequency, urgency or incontinence. She is having severe dry mouth and constipation. She has not started MiraLAX as prescribed. She denies any dysuria or gross hematuria. PVR is low.     Past Medical History:   Diagnosis Date    Abnormal Pap smear of cervix     Anemia     Depression 09/15/2021    Endometriosis     Overactive bladder 4/13/2023     Past Surgical History:   Procedure Laterality Date    APPENDECTOMY      LAPAROSCOPY      x2    TONSILLECTOMY       Outpatient Encounter Medications as of 5/25/2023   Medication Sig Dispense Refill    Omega-3 Fatty Acids (FP FISH OIL PO) Take by mouth daily      VITAMIN D PO Take by mouth daily      FIBER ADULT GUMMIES PO Take by mouth daily      docusate sodium (COLACE) 100 MG capsule Take 1 capsule by mouth at bedtime Take 3 capsules everynight      oxybutynin (DITROPAN XL) 5 MG extended release tablet Take 2 tablets by mouth daily 30 tablet 3

## 2023-05-26 ASSESSMENT — ENCOUNTER SYMPTOMS
COUGH: 0
EYE REDNESS: 0
NAUSEA: 0
BACK PAIN: 0
SHORTNESS OF BREATH: 0
VOMITING: 0
COLOR CHANGE: 0
WHEEZING: 0
CONSTIPATION: 1
ABDOMINAL PAIN: 0
APNEA: 0

## 2023-06-01 ENCOUNTER — OFFICE VISIT (OUTPATIENT)
Dept: OBGYN | Age: 28
End: 2023-06-01
Payer: COMMERCIAL

## 2023-06-01 VITALS
HEIGHT: 63 IN | BODY MASS INDEX: 26.58 KG/M2 | SYSTOLIC BLOOD PRESSURE: 118 MMHG | WEIGHT: 150 LBS | DIASTOLIC BLOOD PRESSURE: 68 MMHG

## 2023-06-01 DIAGNOSIS — N81.10 CYSTOURETHROCELE: ICD-10-CM

## 2023-06-01 DIAGNOSIS — N81.11 CYSTOCELE, MIDLINE: ICD-10-CM

## 2023-06-01 DIAGNOSIS — Z01.419 ENCOUNTER FOR ANNUAL ROUTINE GYNECOLOGICAL EXAMINATION: Primary | ICD-10-CM

## 2023-06-01 PROCEDURE — 99395 PREV VISIT EST AGE 18-39: CPT | Performed by: ADVANCED PRACTICE MIDWIFE

## 2023-06-07 ASSESSMENT — ENCOUNTER SYMPTOMS
SHORTNESS OF BREATH: 0
BACK PAIN: 0
ABDOMINAL PAIN: 0

## 2023-06-16 PROBLEM — G44.229 CHRONIC TENSION-TYPE HEADACHE, NOT INTRACTABLE: Status: ACTIVE | Noted: 2023-06-16

## 2023-06-16 PROBLEM — R63.5 WEIGHT GAIN DUE TO MEDICATION: Status: ACTIVE | Noted: 2023-06-16

## 2023-06-16 PROBLEM — T50.905A WEIGHT GAIN DUE TO MEDICATION: Status: ACTIVE | Noted: 2023-06-16

## 2023-07-05 ENCOUNTER — OFFICE VISIT (OUTPATIENT)
Dept: PRIMARY CARE CLINIC | Age: 28
End: 2023-07-05
Payer: COMMERCIAL

## 2023-07-05 VITALS
TEMPERATURE: 98.7 F | DIASTOLIC BLOOD PRESSURE: 68 MMHG | WEIGHT: 150.1 LBS | OXYGEN SATURATION: 99 % | HEART RATE: 78 BPM | BODY MASS INDEX: 26.59 KG/M2 | RESPIRATION RATE: 20 BRPM | SYSTOLIC BLOOD PRESSURE: 110 MMHG

## 2023-07-05 DIAGNOSIS — R63.2 BINGE EATING: ICD-10-CM

## 2023-07-05 DIAGNOSIS — G44.229 CHRONIC TENSION-TYPE HEADACHE, NOT INTRACTABLE: Primary | ICD-10-CM

## 2023-07-05 PROCEDURE — 99214 OFFICE O/P EST MOD 30 MIN: CPT | Performed by: NURSE PRACTITIONER

## 2023-07-05 RX ORDER — TOPIRAMATE 25 MG/1
25 TABLET ORAL 2 TIMES DAILY
Qty: 90 TABLET | Refills: 1 | Status: SHIPPED
Start: 2023-07-05

## 2023-07-05 SDOH — ECONOMIC STABILITY: FOOD INSECURITY: WITHIN THE PAST 12 MONTHS, THE FOOD YOU BOUGHT JUST DIDN'T LAST AND YOU DIDN'T HAVE MONEY TO GET MORE.: PATIENT DECLINED

## 2023-07-05 SDOH — ECONOMIC STABILITY: INCOME INSECURITY: HOW HARD IS IT FOR YOU TO PAY FOR THE VERY BASICS LIKE FOOD, HOUSING, MEDICAL CARE, AND HEATING?: PATIENT DECLINED

## 2023-07-05 SDOH — ECONOMIC STABILITY: FOOD INSECURITY: WITHIN THE PAST 12 MONTHS, YOU WORRIED THAT YOUR FOOD WOULD RUN OUT BEFORE YOU GOT MONEY TO BUY MORE.: PATIENT DECLINED

## 2023-07-05 SDOH — ECONOMIC STABILITY: HOUSING INSECURITY
IN THE LAST 12 MONTHS, WAS THERE A TIME WHEN YOU DID NOT HAVE A STEADY PLACE TO SLEEP OR SLEPT IN A SHELTER (INCLUDING NOW)?: PATIENT REFUSED

## 2023-07-05 ASSESSMENT — PATIENT HEALTH QUESTIONNAIRE - PHQ9
9. THOUGHTS THAT YOU WOULD BE BETTER OFF DEAD, OR OF HURTING YOURSELF: 0
6. FEELING BAD ABOUT YOURSELF - OR THAT YOU ARE A FAILURE OR HAVE LET YOURSELF OR YOUR FAMILY DOWN: 0
10. IF YOU CHECKED OFF ANY PROBLEMS, HOW DIFFICULT HAVE THESE PROBLEMS MADE IT FOR YOU TO DO YOUR WORK, TAKE CARE OF THINGS AT HOME, OR GET ALONG WITH OTHER PEOPLE: 0
8. MOVING OR SPEAKING SO SLOWLY THAT OTHER PEOPLE COULD HAVE NOTICED. OR THE OPPOSITE, BEING SO FIGETY OR RESTLESS THAT YOU HAVE BEEN MOVING AROUND A LOT MORE THAN USUAL: 0
SUM OF ALL RESPONSES TO PHQ QUESTIONS 1-9: 0
SUM OF ALL RESPONSES TO PHQ QUESTIONS 1-9: 0
4. FEELING TIRED OR HAVING LITTLE ENERGY: 0
7. TROUBLE CONCENTRATING ON THINGS, SUCH AS READING THE NEWSPAPER OR WATCHING TELEVISION: 0
SUM OF ALL RESPONSES TO PHQ9 QUESTIONS 1 & 2: 0
1. LITTLE INTEREST OR PLEASURE IN DOING THINGS: 0
2. FEELING DOWN, DEPRESSED OR HOPELESS: 0
3. TROUBLE FALLING OR STAYING ASLEEP: 0
5. POOR APPETITE OR OVEREATING: 0
SUM OF ALL RESPONSES TO PHQ QUESTIONS 1-9: 0
SUM OF ALL RESPONSES TO PHQ QUESTIONS 1-9: 0

## 2023-07-05 NOTE — PATIENT INSTRUCTIONS
SURVEY:     You may be receiving a survey from [employer] regarding your visit today. Please complete the survey to enable us to provide the highest quality of care to you and your family. If you cannot score us a very good on any question, please call the office to discuss how we could have made your experience a very good one.      Thank you,    Georgi Sellers, APRN-CNP  Mary Stein, APRN-CNP  Lotus Jose, CYDNEYN  Hany Hartman, CMA  Romana Dunn, CMA  Sowmya, CMA  Erika, PCA  Jennifer, PM

## 2023-07-05 NOTE — PROGRESS NOTES
Name: Rafael Ba  : 1995         Chief Complaint:     Chief Complaint   Patient presents with    Migraine     3 Week Topamax check. Does not notice anything yet. History of Present Illness:      Rafael Ba is a 33 y.o.  female who presents with Migraine (3 Week Topamax check. Does not notice anything yet.)      Yfn Radha is here today to establish care. She states she is concerned because she is unable to lose weight after using sertraline. She states she has been trying with diet and exercise but has only been partially successful. She also states she is to use Topamax for headaches which was very helpful and she knows she did lose weight with this medication. I did explain that is not a weight loss medication but if she is having headaches we could retry the medication to see if it helps. She is agreeable. UPDATE 2023-she states she really has not noticed any difference in starting Topamax. She is having intermittent headaches but no more than usual.  She has not lost any weight. Once again I explained this is not a weight loss medication and I encourage healthy eating and physical activity. She does states she has a problem with binge eating at times. See below for further comments.         Headache  Headache pattern:  Headache sometimes there, sometimes not at all  Initial event:  None  Recent changes:  Headaches come more often than they used to  Frequency:  1 per week  Previous testing:  Blood work, CT and EEG  Lifetime total:  20+  Longest time without a headache:  Months  Number of ER visits for headache:  0  Number of hospitalizations for headaches:  0  ADL impact frequency:  Fewer than 1 per month  Time of day symptoms are worse:  No specific time of day  Do headaches wake patient from sleep?: No    Days of the week symptoms are worse:  No specific day of the week  Season symptoms are worse:  No particular season  Quality:  Dull, pounding and throbbing  Laterality:  Both

## 2023-07-06 ENCOUNTER — OFFICE VISIT (OUTPATIENT)
Dept: UROLOGY | Age: 28
End: 2023-07-06
Payer: COMMERCIAL

## 2023-07-06 VITALS
SYSTOLIC BLOOD PRESSURE: 99 MMHG | DIASTOLIC BLOOD PRESSURE: 69 MMHG | WEIGHT: 152 LBS | BODY MASS INDEX: 26.93 KG/M2 | HEIGHT: 63 IN | HEART RATE: 91 BPM | TEMPERATURE: 97.1 F

## 2023-07-06 DIAGNOSIS — R39.14 FEELING OF INCOMPLETE BLADDER EMPTYING: ICD-10-CM

## 2023-07-06 DIAGNOSIS — K59.00 CONSTIPATION, UNSPECIFIED CONSTIPATION TYPE: ICD-10-CM

## 2023-07-06 DIAGNOSIS — N32.81 OVERACTIVE BLADDER: Primary | ICD-10-CM

## 2023-07-06 PROCEDURE — 51798 US URINE CAPACITY MEASURE: CPT | Performed by: UROLOGY

## 2023-07-06 PROCEDURE — 99213 OFFICE O/P EST LOW 20 MIN: CPT | Performed by: UROLOGY

## 2023-07-06 RX ORDER — TROSPIUM CHLORIDE ER 60 MG/1
60 CAPSULE ORAL DAILY
Qty: 30 CAPSULE | Refills: 11 | Status: SHIPPED | OUTPATIENT
Start: 2023-07-06

## 2023-07-06 ASSESSMENT — ENCOUNTER SYMPTOMS
NAUSEA: 0
VOMITING: 0
COUGH: 0
SORE THROAT: 0
ABDOMINAL PAIN: 0
CONSTIPATION: 0
DIARRHEA: 0
RHINORRHEA: 0
CONSTIPATION: 0
SHORTNESS OF BREATH: 0
WHEEZING: 0
SHORTNESS OF BREATH: 0
WHEEZING: 0
COUGH: 0
APNEA: 0
BACK PAIN: 0
ABDOMINAL PAIN: 0
NAUSEA: 0
EYE REDNESS: 0
COLOR CHANGE: 0
VOMITING: 0

## 2023-07-06 NOTE — PATIENT INSTRUCTIONS
SURVEY:    You may be receiving a survey from [employer] regarding your visit today. Please complete the survey to enable us to provide the highest quality of care to you and your family. If you cannot score us a very good on any question, please call the office to discuss how we could of made your experience a very good one. Thank you.

## 2023-07-06 NOTE — PROGRESS NOTES
Bladderscan performed in office today:  Pt voided - 400 mL, PVR - 40 mL
90 tablet 1    Omega-3 Fatty Acids (FP FISH OIL PO) Take by mouth daily      VITAMIN D PO Take by mouth daily      Cyanocobalamin (B-12 PO) Take by mouth daily      [DISCONTINUED] trospium (SANCTURA) 60 MG CP24 extended release capsule Take 1 capsule by mouth daily 30 capsule 2     No facility-administered encounter medications on file as of 7/6/2023. Current Outpatient Medications on File Prior to Visit   Medication Sig Dispense Refill    topiramate (TOPAMAX) 25 MG tablet Take 1 tablet by mouth 2 times daily 90 tablet 1    Omega-3 Fatty Acids (FP FISH OIL PO) Take by mouth daily      VITAMIN D PO Take by mouth daily      Cyanocobalamin (B-12 PO) Take by mouth daily       No current facility-administered medications on file prior to visit. Patient has no known allergies. Family History   Problem Relation Age of Onset    Other Other         no family h/o stroke or ovarian cancer     Breast Cancer Maternal Aunt     No Known Problems Mother     No Known Problems Father     No Known Problems Brother     No Known Problems Maternal Grandmother     Cirrhosis Maternal Grandfather     Cancer Paternal Grandfather         Bone    No Known Problems Brother     No Known Problems Brother      Social History     Tobacco Use   Smoking Status Never   Smokeless Tobacco Never       Social History     Substance and Sexual Activity   Alcohol Use No       Review of Systems   Constitutional:  Negative for appetite change, chills and fever. Eyes:  Negative for redness and visual disturbance. Respiratory:  Negative for apnea, cough, shortness of breath and wheezing. Cardiovascular:  Negative for chest pain and leg swelling. Gastrointestinal:  Negative for abdominal pain, constipation, nausea and vomiting. Genitourinary:  Positive for frequency and urgency. Negative for difficulty urinating, dyspareunia, dysuria, enuresis, flank pain, hematuria, pelvic pain, vaginal bleeding and vaginal discharge.    Musculoskeletal:

## 2023-07-19 ENCOUNTER — NURSE ONLY (OUTPATIENT)
Dept: PRIMARY CARE CLINIC | Age: 28
End: 2023-07-19

## 2023-07-19 VITALS
SYSTOLIC BLOOD PRESSURE: 98 MMHG | WEIGHT: 148.6 LBS | RESPIRATION RATE: 18 BRPM | HEIGHT: 63 IN | HEART RATE: 88 BPM | DIASTOLIC BLOOD PRESSURE: 70 MMHG | BODY MASS INDEX: 26.33 KG/M2 | OXYGEN SATURATION: 98 %

## 2023-08-09 DIAGNOSIS — G44.229 CHRONIC TENSION-TYPE HEADACHE, NOT INTRACTABLE: ICD-10-CM

## 2023-08-09 RX ORDER — TOPIRAMATE 25 MG/1
25 TABLET ORAL 2 TIMES DAILY
Qty: 180 TABLET | Refills: 0 | Status: SHIPPED | OUTPATIENT
Start: 2023-08-09

## 2023-08-09 RX ORDER — TOPIRAMATE 25 MG/1
25 TABLET ORAL 2 TIMES DAILY
Qty: 90 TABLET | Refills: 1 | Status: SHIPPED | OUTPATIENT
Start: 2023-08-09 | End: 2023-08-09 | Stop reason: SDUPTHER

## 2023-08-09 NOTE — TELEPHONE ENCOUNTER
Health Maintenance   Topic Date Due    Flu vaccine (1) 08/01/2023    COVID-19 Vaccine (3 - Booster for Refugio Jensens series) 06/16/2024 (Originally 12/26/2021)    Depression Monitoring  07/05/2024    Pap smear  09/15/2024    DTaP/Tdap/Td vaccine (10 - Td or Tdap) 08/08/2032    Hepatitis A vaccine  Completed    Hib vaccine  Completed    Varicella vaccine  Completed    Meningococcal (ACWY) vaccine  Completed    Hepatitis C screen  Completed    HIV screen  Completed    Pneumococcal 0-64 years Vaccine  Aged Out    Hepatitis B vaccine  Discontinued    HPV vaccine  Discontinued             (applicable per patient's age: Cancer Screenings, Depression Screening, Fall Risk Screening, Immunizations)    AST (U/L)   Date Value   01/20/2023 18     ALT (U/L)   Date Value   01/20/2023 26     BUN (mg/dL)   Date Value   01/20/2023 13      (goal A1C is < 7)   (goal LDL is <100) need 30-50% reduction from baseline     BP Readings from Last 3 Encounters:   07/19/23 98/70   07/06/23 99/69   07/05/23 110/68    (goal /80)      All Future Testing planned in [facility]:  Lab Frequency Next Occurrence       Next Visit Date:  Future Appointments   Date Time Provider [address]   8/15/2023  8:15 AM Belem Thomas DO [facility]   10/6/2023  7:20 AM GERMANIA Carranza - CNP [facility]   1/8/2024  7:45 AM SCHEDULE, [facility] SCHEDULE [facility]   6/4/2024  8:15 AM GERMANIA Davis - ROL[facility]            Patient Active Problem List:     History of migraine headaches     Normal delivery     Encounter for prenatal care in third trimester of first pregnancy     Depression     Term pregnancy     Postpartum state     Feeling of incomplete bladder emptying     Urethrocele, female     Overactive bladder     Chronic tension-type headache, not intractable     Weight gain due to medication

## 2023-08-21 ENCOUNTER — TELEPHONE (OUTPATIENT)
Dept: PRIMARY CARE CLINIC | Age: 28
End: 2023-08-21

## 2023-08-21 NOTE — TELEPHONE ENCOUNTER
Jameel Hamilton has been taking Topamax 25 mg 2 times daily, one a.m., one p.m. Recently Jameel Hamilton has been very fatigued. She has been taking 50 mg at night to help with fatigue but it is not helping. She also feels \"loopy\"    Jameel Hamilton wants to know if she could go back to 25 mg and if so, how to [place] down\"    Please advise, thanks!

## 2023-11-07 ENCOUNTER — OFFICE VISIT (OUTPATIENT)
Dept: OBGYN | Age: 28
End: 2023-11-07
Payer: COMMERCIAL

## 2023-11-07 VITALS
WEIGHT: 145.4 LBS | DIASTOLIC BLOOD PRESSURE: 78 MMHG | BODY MASS INDEX: 25.76 KG/M2 | HEIGHT: 63 IN | SYSTOLIC BLOOD PRESSURE: 116 MMHG

## 2023-11-07 DIAGNOSIS — N32.81 OVERACTIVE BLADDER: ICD-10-CM

## 2023-11-07 DIAGNOSIS — N81.6 RECTOCELE: ICD-10-CM

## 2023-11-07 DIAGNOSIS — N81.11 CYSTOCELE, MIDLINE: Primary | ICD-10-CM

## 2023-11-07 PROCEDURE — 99213 OFFICE O/P EST LOW 20 MIN: CPT | Performed by: OBSTETRICS & GYNECOLOGY

## 2023-12-04 ENCOUNTER — OFFICE VISIT (OUTPATIENT)
Dept: PRIMARY CARE CLINIC | Age: 28
End: 2023-12-04
Payer: COMMERCIAL

## 2023-12-04 VITALS
BODY MASS INDEX: 26.08 KG/M2 | SYSTOLIC BLOOD PRESSURE: 108 MMHG | WEIGHT: 147.2 LBS | DIASTOLIC BLOOD PRESSURE: 70 MMHG | RESPIRATION RATE: 16 BRPM | OXYGEN SATURATION: 98 % | HEART RATE: 100 BPM | TEMPERATURE: 98.5 F

## 2023-12-04 DIAGNOSIS — J06.9 UPPER RESPIRATORY TRACT INFECTION, UNSPECIFIED TYPE: ICD-10-CM

## 2023-12-04 DIAGNOSIS — J02.0 STREP PHARYNGITIS: Primary | ICD-10-CM

## 2023-12-04 DIAGNOSIS — J02.9 PHARYNGITIS, UNSPECIFIED ETIOLOGY: ICD-10-CM

## 2023-12-04 LAB — S PYO AG THROAT QL: POSITIVE

## 2023-12-04 PROCEDURE — 87880 STREP A ASSAY W/OPTIC: CPT | Performed by: NURSE PRACTITIONER

## 2023-12-04 PROCEDURE — 99214 OFFICE O/P EST MOD 30 MIN: CPT | Performed by: NURSE PRACTITIONER

## 2023-12-04 RX ORDER — DEXTROMETHORPHAN HYDROBROMIDE, GUAIFENESIN AND PSEUDOEPHEDRINE HYDROCHLORIDE 15; 400; 60 MG/1; MG/1; MG/1
1 TABLET ORAL EVERY 6 HOURS PRN
Qty: 28 TABLET | Refills: 0 | Status: SHIPPED | OUTPATIENT
Start: 2023-12-04 | End: 2023-12-11

## 2023-12-04 RX ORDER — PENICILLIN V POTASSIUM 500 MG/1
500 TABLET ORAL 2 TIMES DAILY
Qty: 20 TABLET | Refills: 0 | Status: SHIPPED | OUTPATIENT
Start: 2023-12-04 | End: 2023-12-14

## 2023-12-04 ASSESSMENT — ENCOUNTER SYMPTOMS
GASTROINTESTINAL NEGATIVE: 1
ALLERGIC/IMMUNOLOGIC NEGATIVE: 1
RHINORRHEA: 1
SORE THROAT: 1
COUGH: 1
EYES NEGATIVE: 1
SINUS PAIN: 0
SINUS PRESSURE: 0

## 2023-12-04 ASSESSMENT — COLUMBIA-SUICIDE SEVERITY RATING SCALE - C-SSRS
4. HAVE YOU HAD THESE THOUGHTS AND HAD SOME INTENTION OF ACTING ON THEM?: NO
3. HAVE YOU BEEN THINKING ABOUT HOW YOU MIGHT KILL YOURSELF?: NO
7. DID THIS OCCUR IN THE LAST THREE MONTHS: NO
5. HAVE YOU STARTED TO WORK OUT OR WORKED OUT THE DETAILS OF HOW TO KILL YOURSELF? DO YOU INTEND TO CARRY OUT THIS PLAN?: NO

## 2023-12-04 ASSESSMENT — PATIENT HEALTH QUESTIONNAIRE - PHQ9
SUM OF ALL RESPONSES TO PHQ QUESTIONS 1-9: 0
3. TROUBLE FALLING OR STAYING ASLEEP: 0
6. FEELING BAD ABOUT YOURSELF - OR THAT YOU ARE A FAILURE OR HAVE LET YOURSELF OR YOUR FAMILY DOWN: 0
4. FEELING TIRED OR HAVING LITTLE ENERGY: 0
9. THOUGHTS THAT YOU WOULD BE BETTER OFF DEAD, OR OF HURTING YOURSELF: 0
SUM OF ALL RESPONSES TO PHQ9 QUESTIONS 1 & 2: 0
8. MOVING OR SPEAKING SO SLOWLY THAT OTHER PEOPLE COULD HAVE NOTICED. OR THE OPPOSITE, BEING SO FIGETY OR RESTLESS THAT YOU HAVE BEEN MOVING AROUND A LOT MORE THAN USUAL: 0
SUM OF ALL RESPONSES TO PHQ QUESTIONS 1-9: 0
1. LITTLE INTEREST OR PLEASURE IN DOING THINGS: 0
7. TROUBLE CONCENTRATING ON THINGS, SUCH AS READING THE NEWSPAPER OR WATCHING TELEVISION: 0
5. POOR APPETITE OR OVEREATING: 0
SUM OF ALL RESPONSES TO PHQ QUESTIONS 1-9: 0
10. IF YOU CHECKED OFF ANY PROBLEMS, HOW DIFFICULT HAVE THESE PROBLEMS MADE IT FOR YOU TO DO YOUR WORK, TAKE CARE OF THINGS AT HOME, OR GET ALONG WITH OTHER PEOPLE: 0
SUM OF ALL RESPONSES TO PHQ QUESTIONS 1-9: 0
2. FEELING DOWN, DEPRESSED OR HOPELESS: 0

## 2023-12-04 NOTE — PATIENT INSTRUCTIONS
SURVEY:     You may be receiving a survey from [employer] regarding your visit today. Please complete the survey to enable us to provide the highest quality of care to you and your family. If you cannot score us a very good on any question, please call the office to discuss how we could have made your experience a very good one.      Thank you,    Denver Sellers, APRN-CNP  Jerome Mcnair, APRN-CNP  Stephan Briggs, LPN  Enedina Craven, CMA  Shelbi Cerna, CMA  Sowmya, CMA  Erika, PCA  Jennifer, PM

## 2023-12-04 NOTE — PROGRESS NOTES
[facility] PHYSICIANS  Jodie Arreaga, [address] PRIMARY CARE  [address] [facility] [address] [facility]  Dept: [phone]  Dept Fax: [phone]      Name: Thomas Dixon  : 1995         Chief Complaint:     Chief Complaint   Patient presents with    Sore Throat     X 3 days. Congestion     X 3 days. Cough     X 3 days. History of Present Illness:      Thomas Dixon is a 33 y.o.  female who presents with Sore Throat (X 3 days. ), Congestion (X 3 days. ), and Cough (X 3 days. )      HPI    Abdirashid Mendosa is here today for complaints of sore throat and congestion for 3 days. She states she is feeling worse today. She has rhinorrhea and congestion. She denies a headache. Denies sinus pain and pressure. She has a productive cough and feels it in her chest.  Denies a fever. Denies ear pain. She took some DayQuil and NyQuil on Saturday only. Past Medical History:     Past Medical History:   Diagnosis Date    Abnormal Pap smear of cervix     Anemia     Depression 09/15/2021    Endometriosis     Overactive bladder 2023      Reviewed all health maintenance requirements and ordered appropriate tests  Health Maintenance Due   Topic Date Due    COVID-19 Vaccine (3 - 2023-24 season) 2023       Past Surgical History:     Past Surgical History:   Procedure Laterality Date    APPENDECTOMY      LAPAROSCOPY      x2    TONSILLECTOMY          Medications:       Prior to Admission medications    Medication Sig Start Date End Date Taking?  Authorizing Provider   penicillin v potassium (VEETID) 500 MG tablet Take 1 tablet by mouth 2 times daily for 10 days 23 Yes GERMANIA Mitchell CNP   Pseudoephedrine-DM-GG (CAPMIST DM) 60- MG TABS Take 1 tablet by mouth every 6 hours as needed (Sinus pressure) 23 Yes GERMANIA Mitchell CNP   trospium (SANCTURA) 60 MG CP24 extended release capsule Take 1 capsule by mouth daily 23  Yes Gerhardt Neighbors, MD

## 2024-01-08 ENCOUNTER — OFFICE VISIT (OUTPATIENT)
Dept: UROLOGY | Age: 29
End: 2024-01-08
Payer: COMMERCIAL

## 2024-01-08 VITALS
TEMPERATURE: 96.9 F | DIASTOLIC BLOOD PRESSURE: 66 MMHG | HEIGHT: 63 IN | HEART RATE: 92 BPM | BODY MASS INDEX: 26.05 KG/M2 | WEIGHT: 147 LBS | RESPIRATION RATE: 16 BRPM | SYSTOLIC BLOOD PRESSURE: 107 MMHG

## 2024-01-08 DIAGNOSIS — N81.10 CYSTOCELE WITH RECTOCELE: ICD-10-CM

## 2024-01-08 DIAGNOSIS — R39.14 FEELING OF INCOMPLETE BLADDER EMPTYING: ICD-10-CM

## 2024-01-08 DIAGNOSIS — K59.00 CONSTIPATION, UNSPECIFIED CONSTIPATION TYPE: ICD-10-CM

## 2024-01-08 DIAGNOSIS — N81.6 CYSTOCELE WITH RECTOCELE: ICD-10-CM

## 2024-01-08 DIAGNOSIS — N32.81 OVERACTIVE BLADDER: Primary | ICD-10-CM

## 2024-01-08 PROCEDURE — 99214 OFFICE O/P EST MOD 30 MIN: CPT | Performed by: UROLOGY

## 2024-01-08 PROCEDURE — 51798 US URINE CAPACITY MEASURE: CPT | Performed by: UROLOGY

## 2024-01-08 RX ORDER — POLYETHYLENE GLYCOL 3350 17 G/17G
17 POWDER, FOR SOLUTION ORAL DAILY
COMMUNITY
End: 2024-01-08 | Stop reason: SDUPTHER

## 2024-01-08 RX ORDER — POLYETHYLENE GLYCOL 3350 17 G/17G
17 POWDER, FOR SOLUTION ORAL DAILY
Qty: 1 EACH | Refills: 3 | Status: SHIPPED | OUTPATIENT
Start: 2024-01-08

## 2024-01-08 ASSESSMENT — ENCOUNTER SYMPTOMS
BACK PAIN: 0
COUGH: 0
ABDOMINAL PAIN: 0
APNEA: 0
SHORTNESS OF BREATH: 0
WHEEZING: 0
VOMITING: 0
EYE REDNESS: 0
NAUSEA: 0
CONSTIPATION: 0
COLOR CHANGE: 0

## 2024-01-08 NOTE — PROGRESS NOTES
Voided 20cc dark shree urine.  Voids usually 2 x times in 3 hours during waking hours .  Nocturnalx1  Bowel movement daily  PVR 40cc

## 2024-01-08 NOTE — PROGRESS NOTES
HPI:        Patient is a 28 y.o. female in no acute distress.  She is alert and oriented to person, place, and time.        History  Worsening frequency and urgency over the last several months. Nocturia 1-2 times at night.  She does have a bowel movement approximately every other day. Denies urinary incontinence. History of having frequent urinary tract infections.  She also has feelings of incomplete bladder emptying.  Was told by gynecology she has a urethrocele.  She does state that she can feel something \"bulging down there\".  She did not notice this until after her most recent delivery.  Patient is having no baseline pain.  She does have some discomfort.     3/2023 cystoscopy and pelvic exam showed no significant abnormality              Started oxybutynin 5mg                 Miralax daily     4/2023 continued urgency and frequency  increased oxybutynin to 10mg     Currently  Patient is here today for 6-month follow-up.  We had placed the patient on extended release trospium.  Patient had seen gynecology.  She was diagnosed with a cystocele and a rectocele.  She was offered surgical intervention but wished to have more children.  Patient is doing well with the trospium.  She has minimal lower urinary tract symptoms of itching she takes the medicine.  She reports no side effects of the medication.  She has no pain today..  Past Medical History:   Diagnosis Date    Abnormal Pap smear of cervix     Anemia     Depression 09/15/2021    Endometriosis     Overactive bladder 4/13/2023     Past Surgical History:   Procedure Laterality Date    APPENDECTOMY      LAPAROSCOPY      x2    TONSILLECTOMY       Outpatient Encounter Medications as of 1/8/2024   Medication Sig Dispense Refill    trospium (SANCTURA) 60 MG CP24 extended release capsule Take 1 capsule by mouth daily 30 capsule 11    Omega-3 Fatty Acids (FP FISH OIL PO) Take by mouth daily      VITAMIN D PO Take by mouth daily      Cyanocobalamin (B-12 PO) Take

## 2024-04-03 ENCOUNTER — TELEPHONE (OUTPATIENT)
Dept: UROLOGY | Age: 29
End: 2024-04-03

## 2024-04-03 NOTE — TELEPHONE ENCOUNTER
I initiated a PA on the Trospium. Through [employer]. [id number] is pending.  She has new insurance.  [employer]. ID [id number].

## 2024-04-03 NOTE — TELEPHONE ENCOUNTER
The PA was denied. She has to try 2 covered meds for at least 30 days.  She failed Oxybutynin.  Can she try Myrbetriq, Solifenacin, or Toviaz?

## 2024-04-04 RX ORDER — SOLIFENACIN SUCCINATE 10 MG/1
10 TABLET, FILM COATED ORAL DAILY
Qty: 30 TABLET | Refills: 3 | Status: SHIPPED | OUTPATIENT
Start: 2024-04-04

## 2024-04-04 NOTE — TELEPHONE ENCOUNTER
When I went to place the order there was a stop in the system stating that its likely not covered.  We will send in prescription for Vesicare 10 mg.  It is a daily medication.  Possible side effects include dry eye, dry mouth, constipation, retaining urine.    Please make her an appointment for 6 to 8 weeks to discuss efficacy of new medication.

## 2024-04-30 ENCOUNTER — OFFICE VISIT (OUTPATIENT)
Dept: OBGYN | Age: 29
End: 2024-04-30
Payer: MEDICAID

## 2024-04-30 VITALS
HEIGHT: 63 IN | DIASTOLIC BLOOD PRESSURE: 74 MMHG | BODY MASS INDEX: 25.16 KG/M2 | SYSTOLIC BLOOD PRESSURE: 122 MMHG | WEIGHT: 142 LBS

## 2024-04-30 DIAGNOSIS — N92.0 MENORRHAGIA WITH REGULAR CYCLE: ICD-10-CM

## 2024-04-30 DIAGNOSIS — N80.9 ENDOMETRIOSIS DETERMINED BY LAPAROSCOPY: ICD-10-CM

## 2024-04-30 DIAGNOSIS — N81.6 RECTOCELE: ICD-10-CM

## 2024-04-30 DIAGNOSIS — N81.11 CYSTOCELE, MIDLINE: Primary | ICD-10-CM

## 2024-04-30 PROCEDURE — 99213 OFFICE O/P EST LOW 20 MIN: CPT | Performed by: OBSTETRICS & GYNECOLOGY

## 2024-04-30 NOTE — PROGRESS NOTES
time.   Skin:     General: Skin is warm and dry.   Psychiatric:         Mood and Affect: Mood normal.       The patient, Delilah Durant is a 29 y.o. female, was seen with a total time spent of 20 minutes for the visit on this date of service by the E/M provider. The time component had both face to face and non face to face time spent in determining the total time component.  Counseling and education regarding her diagnosis listed below and her options regarding those diagnoses were also included in determining her time component.      Diagnosis Orders   1. Cystocele, midline        2. Rectocele        3. Menorrhagia with regular cycle        4. Endometriosis determined by laparoscopy             The patient had her preventative health maintenance recommendations and follow-up reviewed with her at the completion of her visit.    ASSESSMENT:      1. Cystocele, midline    2. Rectocele    3. Menorrhagia with regular cycle    4. Endometriosis determined by laparoscopy        PLAN:  No orders of the defined types were placed in this encounter.    Return in about 4 weeks (around 5/28/2024) for usn, follow up.       Electronically signed by Elodia Bustillo DO on 04/30/24

## 2024-05-24 NOTE — PROGRESS NOTES
History  Worsening frequency and urgency over the last several months. Nocturia 1-2 times at night.  She does have a bowel movement approximately every other day. Denies urinary incontinence. History of having frequent urinary tract infections.  She also has feelings of incomplete bladder emptying.  Was told by gynecology she has a urethrocele.  She does state that she can feel something \"bulging down there\".  She did not notice this until after her most recent delivery.  Patient is having no baseline pain.  She does have some discomfort.     3/2023 cystoscopy and pelvic exam showed no significant abnormality              Started oxybutynin 5mg                 Miralax daily     4/2023 continued urgency and frequency  increased oxybutynin to 10mg     1/2024 changed to trospium    Today  Here today to follow for frequency and urgency.  She was previously on trospium, but due to her insurance we needed to change this to Vesicare.  She denies urgency or nocturia.  She does have frequency every hour, but states this is improved from every 5 minutes.  She is having a daily bowel movement.  She is having very heavy menstrual bleeding and feels this is due to her endometriosis.  She is working with gynecology.  She states they did offer her a rectocele and cystocele repair, but she is still considering having children.    Plan  Continue Vesicare for now    Continue MiraLAX daily    Continue gynecologic workup due to heavy bleeding    I did explain that first-line treatment for frequency, cystocele and rectocele is physical therapy    We will see her in a few weeks, after her GYN work-up, to determine the next best steps

## 2024-05-28 ENCOUNTER — OFFICE VISIT (OUTPATIENT)
Dept: UROLOGY | Age: 29
End: 2024-05-28
Payer: MEDICAID

## 2024-05-28 VITALS
HEART RATE: 88 BPM | SYSTOLIC BLOOD PRESSURE: 95 MMHG | WEIGHT: 140 LBS | DIASTOLIC BLOOD PRESSURE: 61 MMHG | BODY MASS INDEX: 24.8 KG/M2 | TEMPERATURE: 97.7 F | HEIGHT: 63 IN

## 2024-05-28 DIAGNOSIS — N32.81 OVERACTIVE BLADDER: ICD-10-CM

## 2024-05-28 DIAGNOSIS — R35.0 FREQUENCY OF URINATION: ICD-10-CM

## 2024-05-28 DIAGNOSIS — N81.6 CYSTOCELE WITH RECTOCELE: Primary | ICD-10-CM

## 2024-05-28 DIAGNOSIS — N81.10 CYSTOCELE WITH RECTOCELE: Primary | ICD-10-CM

## 2024-05-28 DIAGNOSIS — K59.00 CONSTIPATION, UNSPECIFIED CONSTIPATION TYPE: ICD-10-CM

## 2024-05-28 PROCEDURE — 99214 OFFICE O/P EST MOD 30 MIN: CPT | Performed by: NURSE PRACTITIONER

## 2024-05-28 PROCEDURE — 51798 US URINE CAPACITY MEASURE: CPT | Performed by: NURSE PRACTITIONER

## 2024-05-28 RX ORDER — POLYETHYLENE GLYCOL 3350 17 G/17G
17 POWDER, FOR SOLUTION ORAL DAILY
Qty: 1 EACH | Refills: 3 | Status: SHIPPED | OUTPATIENT
Start: 2024-05-28

## 2024-05-28 RX ORDER — SOLIFENACIN SUCCINATE 10 MG/1
10 TABLET, FILM COATED ORAL DAILY
Qty: 90 TABLET | Refills: 3 | Status: SHIPPED | OUTPATIENT
Start: 2024-05-28

## 2024-06-04 ENCOUNTER — OFFICE VISIT (OUTPATIENT)
Dept: OBGYN | Age: 29
End: 2024-06-04
Payer: MEDICAID

## 2024-06-04 VITALS
HEIGHT: 63 IN | SYSTOLIC BLOOD PRESSURE: 122 MMHG | DIASTOLIC BLOOD PRESSURE: 70 MMHG | WEIGHT: 136 LBS | BODY MASS INDEX: 24.1 KG/M2

## 2024-06-04 DIAGNOSIS — N94.89 PELVIC CONGESTION: ICD-10-CM

## 2024-06-04 DIAGNOSIS — N83.291 COMPLEX CYST OF RIGHT OVARY: ICD-10-CM

## 2024-06-04 DIAGNOSIS — N80.03 ADENOMYOSIS: ICD-10-CM

## 2024-06-04 DIAGNOSIS — N80.9 ENDOMETRIOSIS DETERMINED BY LAPAROSCOPY: ICD-10-CM

## 2024-06-04 DIAGNOSIS — N92.0 MENORRHAGIA WITH REGULAR CYCLE: Primary | ICD-10-CM

## 2024-06-04 PROCEDURE — 99213 OFFICE O/P EST LOW 20 MIN: CPT | Performed by: OBSTETRICS & GYNECOLOGY

## 2024-06-04 NOTE — PROGRESS NOTES
DATE OF VISIT:  6/4/24    PATIENT NAME:  Delilah Durant     YOB: 1995    REASON FOR VISIT:    Chief Complaint   Patient presents with    Follow-up     Pt is here today for follow up gyn usn, due to heavy bleeding.         HISTORY OF PRESENT ILLNESS:  Pt with complaint of one day of heavy bleeding with her cycles; states she has no pain; hx of endometriosis per dr ventura at time of laparoscopy; pt had usn that showed 9 cm adenomyotic uterus and pelvic congestion as well as 2 cm complex right ovarian cyst; pt planning to do pt for bladder and has decided that she wants to have another baby        Patient's last menstrual period was 05/24/2024 (exact date).  Vitals:    06/04/24 0950   BP: 122/70   Position: Sitting   Weight: 61.7 kg (136 lb)   Height: 1.6 m (5' 3\")     Body mass index is 24.09 kg/m².  No Known Allergies  Current Outpatient Medications   Medication Sig Dispense Refill    solifenacin (VESICARE) 10 MG tablet Take 1 tablet by mouth daily 90 tablet 3    polyethylene glycol (GLYCOLAX) 17 GM/SCOOP powder Take 17 g by mouth daily 1 each 3    Omega-3 Fatty Acids (FP FISH OIL PO) Take by mouth daily      VITAMIN D PO Take by mouth daily      Cyanocobalamin (B-12 PO) Take by mouth daily       No current facility-administered medications for this visit.     Social History     Socioeconomic History    Marital status:      Spouse name: None    Number of children: None    Years of education: None    Highest education level: None   Tobacco Use    Smoking status: Never    Smokeless tobacco: Never   Vaping Use    Vaping Use: Never used   Substance and Sexual Activity    Alcohol use: No    Drug use: No    Sexual activity: Yes     Partners: Male     Birth control/protection: Condom     Social Determinants of Health     Financial Resource Strain: Patient Declined (7/5/2023)    Overall Financial Resource Strain (CARDIA)     Difficulty of Paying Living Expenses: Patient declined   Transportation Needs:

## 2024-06-24 ENCOUNTER — TELEPHONE (OUTPATIENT)
Dept: UROLOGY | Age: 29
End: 2024-06-24

## 2024-06-24 ENCOUNTER — OFFICE VISIT (OUTPATIENT)
Dept: UROLOGY | Age: 29
End: 2024-06-24
Payer: MEDICAID

## 2024-06-24 VITALS
HEIGHT: 63 IN | HEART RATE: 77 BPM | SYSTOLIC BLOOD PRESSURE: 98 MMHG | DIASTOLIC BLOOD PRESSURE: 67 MMHG | BODY MASS INDEX: 24.84 KG/M2 | TEMPERATURE: 98 F | WEIGHT: 140.2 LBS

## 2024-06-24 DIAGNOSIS — N81.6 CYSTOCELE WITH RECTOCELE: Primary | ICD-10-CM

## 2024-06-24 DIAGNOSIS — R39.14 FEELING OF INCOMPLETE BLADDER EMPTYING: ICD-10-CM

## 2024-06-24 DIAGNOSIS — N81.10 CYSTOCELE WITH RECTOCELE: Primary | ICD-10-CM

## 2024-06-24 DIAGNOSIS — K59.00 CONSTIPATION, UNSPECIFIED CONSTIPATION TYPE: ICD-10-CM

## 2024-06-24 PROCEDURE — 99214 OFFICE O/P EST MOD 30 MIN: CPT | Performed by: NURSE PRACTITIONER

## 2024-06-24 PROCEDURE — 51798 US URINE CAPACITY MEASURE: CPT | Performed by: NURSE PRACTITIONER

## 2024-06-24 RX ORDER — MIRABEGRON 50 MG/1
50 TABLET, EXTENDED RELEASE ORAL DAILY
Qty: 30 TABLET | Refills: 3 | Status: SHIPPED | OUTPATIENT
Start: 2024-06-24

## 2024-06-24 RX ORDER — POLYETHYLENE GLYCOL 3350 17 G/17G
17 POWDER, FOR SOLUTION ORAL DAILY
Qty: 1850 G | Refills: 3 | Status: SHIPPED | OUTPATIENT
Start: 2024-06-24

## 2024-06-24 NOTE — PROGRESS NOTES
History  Worsening frequency and urgency over the last several months. Nocturia 1-2 times at night.  She does have a bowel movement approximately every other day. Denies urinary incontinence. History of having frequent urinary tract infections.  She also has feelings of incomplete bladder emptying.  Was told by gynecology she has a urethrocele.  She does state that she can feel something \"bulging down there\".  She did not notice this until after her most recent delivery.  Patient is having no baseline pain.  She does have some discomfort.     3/2023 cystoscopy and pelvic exam showed no significant abnormality              Started oxybutynin 5mg                 Miralax daily     4/2023 continued urgency and frequency  increased oxybutynin to 10mg     1/2024 changed to trospium    4/2024 changed to vesicare per insurance    Today  Here today to follow for frequency and urgency.  She is taking Vesicare.  She denies urgency or nocturia.  She does have frequency every 30 minutes to 1 hour, but states this is improved from every 5 minutes.  She is having a daily bowel movement with miralax.  She is having very heavy menstrual bleeding and feels this is due to her endometriosis.  She is working with gynecology.      Plan  Stop vesicare    Start myrbetriq    Discussed risks (including worsening symptoms, infection, pain) and benefits of pelvic floor rehab to help with frequency, cystocele and rectocele. We typically schedule PFR once per week for 8 weeks initially, then re-evaluate at that time. Sessions typically last one hour each week. We will check an explanation of benefits with patient's insurance company prior to starting therapy. Patient is agreeable with this plan.

## 2024-06-24 NOTE — TELEPHONE ENCOUNTER
Spoke to Miriam at [employer].  The PFR is covered, no auth required. Ref # [id number]. I will call and schedule patient.    I called [employer] to verify they needed a prior auth on the Myrbetriq.  They said it needed one, they ran it as generic.  I asked them to run it as brand and it went through with $0 copay.

## 2024-06-24 NOTE — PATIENT INSTRUCTIONS
Take vesicare for 6 more weeks, then stop      SURVEY:    You may be receiving a survey from [employer] regarding your visit today.    Please complete the survey to enable us to provide the highest quality of care to you and your family.    If you cannot score us a very good on any question, please call the office to discuss how we could have made your experience a very good one.    Thank you.

## 2024-06-27 ENCOUNTER — HOSPITAL ENCOUNTER (OUTPATIENT)
Age: 29
Discharge: HOME | End: 2024-06-27
Payer: MEDICAID

## 2024-06-27 ENCOUNTER — OFFICE VISIT (OUTPATIENT)
Dept: OBGYN | Age: 29
End: 2024-06-27
Payer: MEDICAID

## 2024-06-27 ENCOUNTER — HOSPITAL ENCOUNTER (OUTPATIENT)
Age: 29
Setting detail: SPECIMEN
Discharge: HOME | End: 2024-06-27
Payer: MEDICAID

## 2024-06-27 VITALS
WEIGHT: 137 LBS | HEIGHT: 63 IN | SYSTOLIC BLOOD PRESSURE: 118 MMHG | BODY MASS INDEX: 24.27 KG/M2 | DIASTOLIC BLOOD PRESSURE: 64 MMHG

## 2024-06-27 DIAGNOSIS — Z01.419 ENCOUNTER FOR ANNUAL ROUTINE GYNECOLOGICAL EXAMINATION: Primary | ICD-10-CM

## 2024-06-27 DIAGNOSIS — N64.3 GALACTORRHEA: ICD-10-CM

## 2024-06-27 DIAGNOSIS — Z12.4 SCREENING FOR MALIGNANT NEOPLASM OF CERVIX: ICD-10-CM

## 2024-06-27 PROCEDURE — 99395 PREV VISIT EST AGE 18-39: CPT | Performed by: ADVANCED PRACTICE MIDWIFE

## 2024-06-27 PROCEDURE — 87624 HPV HI-RISK TYP POOLED RSLT: CPT

## 2024-06-27 PROCEDURE — 84146 ASSAY OF PROLACTIN: CPT

## 2024-06-27 PROCEDURE — 36415 COLL VENOUS BLD VENIPUNCTURE: CPT

## 2024-06-27 PROCEDURE — G0145 SCR C/V CYTO,THINLAYER,RESCR: HCPCS

## 2024-06-27 RX ORDER — SOLIFENACIN SUCCINATE 10 MG/1
5 TABLET, FILM COATED ORAL DAILY
COMMUNITY

## 2024-06-27 ASSESSMENT — ENCOUNTER SYMPTOMS
BACK PAIN: 0
SHORTNESS OF BREATH: 0
ABDOMINAL PAIN: 0

## 2024-06-27 NOTE — PROGRESS NOTES
YEARLY PHYSICAL    Date of service: 2024    Delilah Durant  Is a 29 y.o.  , female    PT's PCP is: Michael Sellers, GERMANIA - CNP     : 1995                                             Subjective:       Patient's last menstrual period was 2024 (exact date).     Are your menses regular: yes    OB History    Para Term  AB Living   3 2 2 0 1 2   SAB IAB Ectopic Molar Multiple Live Births   1 0 0 0 0 2      # Outcome Date GA Lbr Abraham/2nd Weight Sex Delivery Anes PTL Lv   3 Term 10/11/22 40w0d / 00:01 3.728 kg (8 lb 3.5 oz) M Vag-Spont None N AIMEE   2 Term 20 40w5d 04:29 / 00:15 3.413 kg (7 lb 8.4 oz) F Vag-Spont None N AIMEE      Complications: Post-dates pregnancy   1 SAB 2016 6w0d    SAB   ND        Social History     Tobacco Use   Smoking Status Never   Smokeless Tobacco Never        Social History     Substance and Sexual Activity   Alcohol Use No       Family History   Problem Relation Age of Onset    Other Other         no family h/o stroke or ovarian cancer     Breast Cancer Maternal Aunt     No Known Problems Mother     No Known Problems Father     No Known Problems Brother     No Known Problems Maternal Grandmother     Cirrhosis Maternal Grandfather     Cancer Paternal Grandfather         Bone    No Known Problems Brother     No Known Problems Brother        Any family history of breast or ovarian cancer: Yes-Maunt-Breast    Any family history of blood clots: No    Allergies: Patient has no known allergies.      Current Outpatient Medications:     solifenacin (VESICARE) 10 MG tablet, Take 0.5 tablets by mouth daily, Disp: , Rfl:     polyethylene glycol (GLYCOLAX) 17 GM/SCOOP powder, Take 17 g by mouth daily, Disp: 1850 g, Rfl: 3    Omega-3 Fatty Acids (FP FISH OIL PO), Take by mouth daily, Disp: , Rfl:     VITAMIN D PO, Take by mouth daily, Disp: , Rfl:     Cyanocobalamin (B-12 PO), Take by mouth daily,

## 2024-06-28 LAB — PROLACTIN SERPL-MCNC: 20 NG/ML (ref 4.79–23.3)

## 2024-07-02 LAB
HPV I/H RISK 4 DNA CVX QL NAA+PROBE: NOT DETECTED
HPV SAMPLE: NORMAL
HPV, INTERPRETATION: NORMAL
HPV16 DNA CVX QL NAA+PROBE: NOT DETECTED
HPV18 DNA CVX QL NAA+PROBE: NOT DETECTED
SPECIMEN DESCRIPTION: NORMAL

## 2024-07-07 LAB — CYTOLOGY REPORT: NORMAL

## 2024-07-09 ENCOUNTER — PROCEDURE VISIT (OUTPATIENT)
Dept: UROLOGY | Age: 29
End: 2024-07-09

## 2024-07-09 VITALS
TEMPERATURE: 97.7 F | HEART RATE: 71 BPM | BODY MASS INDEX: 24.27 KG/M2 | DIASTOLIC BLOOD PRESSURE: 62 MMHG | WEIGHT: 137 LBS | SYSTOLIC BLOOD PRESSURE: 110 MMHG

## 2024-07-09 DIAGNOSIS — R39.14 FEELING OF INCOMPLETE BLADDER EMPTYING: ICD-10-CM

## 2024-07-09 DIAGNOSIS — M62.89 MYOTONIA: ICD-10-CM

## 2024-07-09 DIAGNOSIS — N81.10 CYSTOCELE WITH RECTOCELE: Primary | ICD-10-CM

## 2024-07-09 DIAGNOSIS — N81.6 CYSTOCELE WITH RECTOCELE: Primary | ICD-10-CM

## 2024-07-09 DIAGNOSIS — R35.0 FREQUENCY OF URINATION: ICD-10-CM

## 2024-07-09 NOTE — PROGRESS NOTES
INITIAL PELVIC FLOOR REHAB INTAKE    Symptoms  Daytime voiding frequency: 2-3 every hour  Nighttime voiding frequency: 1  Urgency: none  Incontinence episodes per day: 0, Causes: 0  Number of pads used per day: 0  Pelvic pain: no  Pain with intercourse: yes - little uncomfortable  Bowel habits: Soft, Large , Fecal incontinence: no    Pertinent History  Previous pelvic surgery: no  Number vaginal deliveries: 2 Episiotomy: No  Number c-sections: 0  Urology medications/diuretics: 1    Intake of spicy/citrus/tomato based foods: occasional  Caffeine intake per day: 1 -2 cups of coffee  Fluid intake per day: over 60 oz.   Alcohol intake: rare  Smoking: No    Contraindications  Pacemaker: No  Pregnant/trying to conceive: No  Metal IUD? No  Unstable seizure disorder: No  Active UTI: UACS from no reviewed and negative    Was able to hold for 6 seconds at strong amplitude.   Fatigue after 4 repetitions.     Extremely difficulty relaxing (almost 10 seconds to relax)    Home exercise regimen prescribed:   Repetitions - 4   Contract - 6 sec   Relax - 10 sec   + 6 Quick Flicks  Frequency- 4 times daily    Stimulated with 45 average mA for 8 minutes.

## 2024-07-15 ENCOUNTER — PROCEDURE VISIT (OUTPATIENT)
Dept: UROLOGY | Age: 29
End: 2024-07-15
Payer: MEDICAID

## 2024-07-15 VITALS
BODY MASS INDEX: 24.27 KG/M2 | DIASTOLIC BLOOD PRESSURE: 72 MMHG | TEMPERATURE: 97.8 F | SYSTOLIC BLOOD PRESSURE: 104 MMHG | WEIGHT: 137 LBS

## 2024-07-15 DIAGNOSIS — N81.10 CYSTOCELE WITH RECTOCELE: Primary | ICD-10-CM

## 2024-07-15 DIAGNOSIS — R39.14 FEELING OF INCOMPLETE BLADDER EMPTYING: ICD-10-CM

## 2024-07-15 DIAGNOSIS — R35.0 FREQUENCY OF URINATION: ICD-10-CM

## 2024-07-15 DIAGNOSIS — N81.6 CYSTOCELE WITH RECTOCELE: Primary | ICD-10-CM

## 2024-07-15 DIAGNOSIS — M62.89 MYOTONIA: ICD-10-CM

## 2024-07-15 PROCEDURE — 97750 PHYSICAL PERFORMANCE TEST: CPT | Performed by: NURSE PRACTITIONER

## 2024-07-15 PROCEDURE — 97032 APPL MODALITY 1+ESTIM EA 15: CPT | Performed by: NURSE PRACTITIONER

## 2024-07-15 PROCEDURE — 51784 ANAL/URINARY MUSCLE STUDY: CPT | Performed by: NURSE PRACTITIONER

## 2024-07-15 PROCEDURE — 91122 PR ANORECTAL MANOMETRY: CPT | Performed by: NURSE PRACTITIONER

## 2024-07-22 ENCOUNTER — PROCEDURE VISIT (OUTPATIENT)
Dept: UROLOGY | Age: 29
End: 2024-07-22
Payer: MEDICAID

## 2024-07-22 VITALS
TEMPERATURE: 97.3 F | WEIGHT: 136 LBS | DIASTOLIC BLOOD PRESSURE: 68 MMHG | SYSTOLIC BLOOD PRESSURE: 100 MMHG | HEART RATE: 71 BPM | BODY MASS INDEX: 24.09 KG/M2

## 2024-07-22 DIAGNOSIS — R39.14 FEELING OF INCOMPLETE BLADDER EMPTYING: ICD-10-CM

## 2024-07-22 DIAGNOSIS — M62.89 MYOTONIA: ICD-10-CM

## 2024-07-22 DIAGNOSIS — R35.0 FREQUENCY OF URINATION: ICD-10-CM

## 2024-07-22 DIAGNOSIS — N81.6 CYSTOCELE WITH RECTOCELE: Primary | ICD-10-CM

## 2024-07-22 DIAGNOSIS — N81.10 CYSTOCELE WITH RECTOCELE: Primary | ICD-10-CM

## 2024-07-22 PROCEDURE — 51784 ANAL/URINARY MUSCLE STUDY: CPT | Performed by: NURSE PRACTITIONER

## 2024-07-22 PROCEDURE — 97750 PHYSICAL PERFORMANCE TEST: CPT | Performed by: NURSE PRACTITIONER

## 2024-07-22 PROCEDURE — 91122 PR ANORECTAL MANOMETRY: CPT | Performed by: NURSE PRACTITIONER

## 2024-07-22 PROCEDURE — 97032 APPL MODALITY 1+ESTIM EA 15: CPT | Performed by: NURSE PRACTITIONER

## 2024-07-22 NOTE — PROGRESS NOTES
PELVIC FLOOR REHAB FOLLOW UP    At last visit (PFR # 2, 1 weeks ago):      Was able to hold for 6 seconds at moderate amplitude.   Fatigue after 4 repetitions.      Home exercise regimen prescribed:   Repetitions - 6   Contract - 6 sec   Relax - 10 sec   + 8 Quick Flicks  Frequency- 4 times daily     Stimulated with 39 average mA for 10 minutes    Symptoms  Daytime voiding frequency: q 20-30 minutes  , unchanged  Nighttime voiding frequency: 1 times per night, unchanged  Urgency: moderate, unchanged    Incontinence episodes per day: 0, Causes: 0, unchanged  Number of pads used per day: 0, unchanged    Pelvic pain: unchanged  Pain with intercourse unchanged    Bowel habits: unchanged  Fecal incontinence: unchanged    OVERALL IMPROVEMENT SINCE INITIAL SESSION:   mild in degree    Compliance:  Has pt completed exercises as instructed: Yes    Changes Since Initial Visit  Intake of spicy/citrus/tomato based foods: unchanged  Caffeine intake per day: unchanged 2 cups of coffee  Fluid intake per day: unchanged 60 oz daily  Alcohol intake: unchanged  Smoking: unchanged    Was able to hold for 6 seconds at weak amplitude.   Fatigue after 6 repetitions.     Home exercise regimen prescribed:   Repetitions - 6   Contract - 6 sec   Relax - 10 sec   + 10 Quick Flicks  Frequency- 4 times daily    Stimulated with 64 average mA for 12 minutes.

## 2024-07-30 ENCOUNTER — PROCEDURE VISIT (OUTPATIENT)
Dept: UROLOGY | Age: 29
End: 2024-07-30
Payer: MEDICAID

## 2024-07-30 VITALS
HEART RATE: 74 BPM | DIASTOLIC BLOOD PRESSURE: 68 MMHG | WEIGHT: 137 LBS | BODY MASS INDEX: 24.27 KG/M2 | TEMPERATURE: 98 F | SYSTOLIC BLOOD PRESSURE: 110 MMHG

## 2024-07-30 DIAGNOSIS — R39.14 FEELING OF INCOMPLETE BLADDER EMPTYING: ICD-10-CM

## 2024-07-30 DIAGNOSIS — M62.89 MYOTONIA: ICD-10-CM

## 2024-07-30 DIAGNOSIS — N81.10 CYSTOCELE WITH RECTOCELE: Primary | ICD-10-CM

## 2024-07-30 DIAGNOSIS — R35.0 FREQUENCY OF URINATION: ICD-10-CM

## 2024-07-30 DIAGNOSIS — N81.6 CYSTOCELE WITH RECTOCELE: Primary | ICD-10-CM

## 2024-07-30 PROCEDURE — 97750 PHYSICAL PERFORMANCE TEST: CPT | Performed by: NURSE PRACTITIONER

## 2024-07-30 PROCEDURE — 97032 APPL MODALITY 1+ESTIM EA 15: CPT | Performed by: NURSE PRACTITIONER

## 2024-07-30 PROCEDURE — 51784 ANAL/URINARY MUSCLE STUDY: CPT | Performed by: NURSE PRACTITIONER

## 2024-07-30 PROCEDURE — 91122 PR ANORECTAL MANOMETRY: CPT | Performed by: NURSE PRACTITIONER

## 2024-07-30 NOTE — PROGRESS NOTES
PELVIC FLOOR REHAB FOLLOW UP    At last visit (PFR # 3, 1 weeks ago):      Was able to hold for 6 seconds at weak amplitude.   Fatigue after 6 repetitions.      Home exercise regimen prescribed:   Repetitions - 6   Contract - 6 sec   Relax - 10 sec   + 10 Quick Flicks  Frequency- 4 times daily     Stimulated with 64 average mA for 12 minutes      Symptoms  Daytime voiding frequency: q 30 minutesunchanged  Nighttime voiding frequency: 1 times per night, unchanged  Urgency: moderate, unchanged    Incontinence episodes per day: 0, Causes: 0, unchanged  Number of pads used per day: 0, unchanged    Pelvic pain: unchanged  Pain with intercourse unchanged    Bowel habits: unchanged  Fecal incontinence: unchanged    OVERALL IMPROVEMENT SINCE INITIAL SESSION:   mild in degree    Compliance:  Has pt completed exercises as instructed: Yes    Changes Since Initial Visit  Intake of spicy/citrus/tomato based foods: unchanged  Caffeine intake per day: unchanged  Fluid intake per day: unchanged  Alcohol intake: unchanged  Smoking: unchanged    Was able to hold for 8 seconds at moderate amplitude.   Fatigue after 8 repetitions.     Home exercise regimen prescribed:   Repetitions - 8   Contract - 8 sec   Relax - 10 sec   + 10 Quick Flicks  Frequency- 4 times daily    Stimulated with 64 average mA for 15 minutes.

## 2024-08-05 ENCOUNTER — PROCEDURE VISIT (OUTPATIENT)
Dept: UROLOGY | Age: 29
End: 2024-08-05

## 2024-08-05 VITALS
WEIGHT: 134 LBS | DIASTOLIC BLOOD PRESSURE: 62 MMHG | BODY MASS INDEX: 23.74 KG/M2 | SYSTOLIC BLOOD PRESSURE: 110 MMHG | TEMPERATURE: 98.9 F | HEART RATE: 59 BPM

## 2024-08-05 DIAGNOSIS — N81.6 CYSTOCELE WITH RECTOCELE: Primary | ICD-10-CM

## 2024-08-05 DIAGNOSIS — R35.0 FREQUENCY OF URINATION: ICD-10-CM

## 2024-08-05 DIAGNOSIS — R39.14 FEELING OF INCOMPLETE BLADDER EMPTYING: ICD-10-CM

## 2024-08-05 DIAGNOSIS — M62.89 MYOTONIA: ICD-10-CM

## 2024-08-05 DIAGNOSIS — N81.10 CYSTOCELE WITH RECTOCELE: Primary | ICD-10-CM

## 2024-08-05 NOTE — PROGRESS NOTES
PELVIC FLOOR REHAB FOLLOW UP    At last visit (PFR # 4, 1 weeks ago):    Was able to hold for 8 seconds at moderate amplitude.   Fatigue after 8 repetitions.      Home exercise regimen prescribed:   Repetitions - 8   Contract - 8 sec   Relax - 10 sec   + 10 Quick Flicks  Frequency- 4 times daily     Stimulated with 64 average mA for 15 minutes      Symptoms  Daytime voiding frequency: q 1-2 hrs, decreased  Nighttime voiding frequency: 0 times per night, unchanged  Urgency: mild, unchanged    Incontinence episodes per day: 0, Causes: none, unchanged  Number of pads used per day: 0, unchanged    Pelvic pain: unchanged  Pain with intercourse unchanged    Bowel habits: unchanged  Fecal incontinence: unchanged    OVERALL IMPROVEMENT SINCE INITIAL SESSION:   moderate in degree    Compliance:  Has pt completed exercises as instructed: Yes    Changes Since Initial Visit  Intake of spicy/citrus/tomato based foods: unchanged  Caffeine intake per day: unchanged  Fluid intake per day: unchanged  Alcohol intake: unchanged  Smoking: unchanged    Was able to hold for 10 seconds at moderate-strong amplitude.   Fatigue after 10 repetitions.     Home exercise regimen prescribed:   Repetitions - 10   Contract - 10 sec   Relax - 10 sec   + 10 Quick Flicks  Frequency- 4 times daily    Stimulated with 55 average mA for 15 minutes.

## 2024-08-06 ENCOUNTER — OFFICE VISIT (OUTPATIENT)
Dept: OBGYN | Age: 29
End: 2024-08-06
Payer: MEDICAID

## 2024-08-06 VITALS
DIASTOLIC BLOOD PRESSURE: 64 MMHG | WEIGHT: 133.4 LBS | HEIGHT: 63 IN | BODY MASS INDEX: 23.64 KG/M2 | SYSTOLIC BLOOD PRESSURE: 106 MMHG

## 2024-08-06 DIAGNOSIS — N83.291 COMPLEX CYST OF RIGHT OVARY: Primary | ICD-10-CM

## 2024-08-06 DIAGNOSIS — N80.9 ENDOMETRIOSIS DETERMINED BY LAPAROSCOPY: ICD-10-CM

## 2024-08-06 PROCEDURE — 99213 OFFICE O/P EST LOW 20 MIN: CPT | Performed by: OBSTETRICS & GYNECOLOGY

## 2024-08-06 SDOH — ECONOMIC STABILITY: FOOD INSECURITY: WITHIN THE PAST 12 MONTHS, YOU WORRIED THAT YOUR FOOD WOULD RUN OUT BEFORE YOU GOT MONEY TO BUY MORE.: NEVER TRUE

## 2024-08-06 SDOH — ECONOMIC STABILITY: HOUSING INSECURITY
IN THE LAST 12 MONTHS, WAS THERE A TIME WHEN YOU DID NOT HAVE A STEADY PLACE TO SLEEP OR SLEPT IN A SHELTER (INCLUDING NOW)?: NO

## 2024-08-06 SDOH — ECONOMIC STABILITY: FOOD INSECURITY: WITHIN THE PAST 12 MONTHS, THE FOOD YOU BOUGHT JUST DIDN'T LAST AND YOU DIDN'T HAVE MONEY TO GET MORE.: NEVER TRUE

## 2024-08-06 SDOH — ECONOMIC STABILITY: INCOME INSECURITY: HOW HARD IS IT FOR YOU TO PAY FOR THE VERY BASICS LIKE FOOD, HOUSING, MEDICAL CARE, AND HEATING?: NOT HARD AT ALL

## 2024-08-06 ASSESSMENT — PATIENT HEALTH QUESTIONNAIRE - PHQ9
4. FEELING TIRED OR HAVING LITTLE ENERGY: NOT AT ALL
5. POOR APPETITE OR OVEREATING: NOT AT ALL
SUM OF ALL RESPONSES TO PHQ QUESTIONS 1-9: 0
SUM OF ALL RESPONSES TO PHQ QUESTIONS 1-9: 0
3. TROUBLE FALLING OR STAYING ASLEEP: NOT AT ALL
10. IF YOU CHECKED OFF ANY PROBLEMS, HOW DIFFICULT HAVE THESE PROBLEMS MADE IT FOR YOU TO DO YOUR WORK, TAKE CARE OF THINGS AT HOME, OR GET ALONG WITH OTHER PEOPLE: NOT DIFFICULT AT ALL
6. FEELING BAD ABOUT YOURSELF - OR THAT YOU ARE A FAILURE OR HAVE LET YOURSELF OR YOUR FAMILY DOWN: NOT AT ALL
SUM OF ALL RESPONSES TO PHQ QUESTIONS 1-9: 0
SUM OF ALL RESPONSES TO PHQ9 QUESTIONS 1 & 2: 0
9. THOUGHTS THAT YOU WOULD BE BETTER OFF DEAD, OR OF HURTING YOURSELF: NOT AT ALL
8. MOVING OR SPEAKING SO SLOWLY THAT OTHER PEOPLE COULD HAVE NOTICED. OR THE OPPOSITE, BEING SO FIGETY OR RESTLESS THAT YOU HAVE BEEN MOVING AROUND A LOT MORE THAN USUAL: NOT AT ALL
7. TROUBLE CONCENTRATING ON THINGS, SUCH AS READING THE NEWSPAPER OR WATCHING TELEVISION: NOT AT ALL
SUM OF ALL RESPONSES TO PHQ QUESTIONS 1-9: 0
1. LITTLE INTEREST OR PLEASURE IN DOING THINGS: NOT AT ALL
2. FEELING DOWN, DEPRESSED OR HOPELESS: NOT AT ALL

## 2024-08-06 NOTE — PROGRESS NOTES
DATE OF VISIT:  8/6/24    PATIENT NAME:  Delilah Durant     YOB: 1995    REASON FOR VISIT:    Chief Complaint   Patient presents with    Ovarian Cyst     Patient is being seen after GYN US to check ovarian cyst.  Patient states that there is no pelvic pain but continues to have very heavy periods.          HISTORY OF PRESENT ILLNESS:  Pt continues to have heavy menses; complex cyst on right ovary is stable; pt with hx of endometriosis, but wanting another baby; disc'd continued expectant management and annual as needed; pt on week 5 of PT for bladder and states she is doing well with medication and PT; denies any pain - no even with menses; states she has one day of heavy bleeding with cycle (d2-3), but it is manageable; pt aware that if she has pelvic pain that we will re-evaluate        Patient's last menstrual period was 07/17/2024.  Vitals:    08/06/24 0958   BP: 106/64   Weight: 60.5 kg (133 lb 6.4 oz)   Height: 1.6 m (5' 3\")     Body mass index is 23.63 kg/m².  No Known Allergies  Current Outpatient Medications   Medication Sig Dispense Refill    solifenacin (VESICARE) 10 MG tablet Take 0.5 tablets by mouth daily      mirabegron (MYRBETRIQ) 50 MG TB24 Take 50 mg by mouth daily 30 tablet 3    polyethylene glycol (GLYCOLAX) 17 GM/SCOOP powder Take 17 g by mouth daily 1850 g 3    Omega-3 Fatty Acids (FP FISH OIL PO) Take by mouth daily      VITAMIN D PO Take by mouth daily      Cyanocobalamin (B-12 PO) Take by mouth daily       No current facility-administered medications for this visit.     Social History     Socioeconomic History    Marital status:      Spouse name: None    Number of children: None    Years of education: None    Highest education level: None   Tobacco Use    Smoking status: Never    Smokeless tobacco: Never   Vaping Use    Vaping Use: Never used   Substance and Sexual Activity    Alcohol use: No    Drug use: No    Sexual activity: Yes     Partners: Male     Birth

## 2024-08-13 ENCOUNTER — PROCEDURE VISIT (OUTPATIENT)
Dept: UROLOGY | Age: 29
End: 2024-08-13
Payer: MEDICAID

## 2024-08-13 VITALS
HEART RATE: 75 BPM | DIASTOLIC BLOOD PRESSURE: 60 MMHG | SYSTOLIC BLOOD PRESSURE: 100 MMHG | TEMPERATURE: 98.7 F | BODY MASS INDEX: 23.91 KG/M2 | WEIGHT: 135 LBS

## 2024-08-13 DIAGNOSIS — R35.0 FREQUENCY OF URINATION: ICD-10-CM

## 2024-08-13 DIAGNOSIS — M62.89 MYOTONIA: Primary | ICD-10-CM

## 2024-08-13 DIAGNOSIS — N81.10 CYSTOCELE WITH RECTOCELE: ICD-10-CM

## 2024-08-13 DIAGNOSIS — N81.6 CYSTOCELE WITH RECTOCELE: ICD-10-CM

## 2024-08-13 DIAGNOSIS — R39.14 FEELING OF INCOMPLETE BLADDER EMPTYING: ICD-10-CM

## 2024-08-13 PROCEDURE — 97032 APPL MODALITY 1+ESTIM EA 15: CPT | Performed by: PHYSICIAN ASSISTANT

## 2024-08-13 NOTE — PROGRESS NOTES
STIM ONLY    Home exercise regimen prescribed as documented at last visit:   Repetitions - 10  Contract - 10 sec   Relax - 10 sec   + 10 Quick Flicks  Frequency- 4 times daily    Stimulated with 59 average mA for 15minutes.

## 2024-08-26 ENCOUNTER — PROCEDURE VISIT (OUTPATIENT)
Dept: UROLOGY | Age: 29
End: 2024-08-26
Payer: MEDICAID

## 2024-08-26 VITALS
TEMPERATURE: 98.9 F | WEIGHT: 133 LBS | SYSTOLIC BLOOD PRESSURE: 103 MMHG | BODY MASS INDEX: 23.56 KG/M2 | HEART RATE: 94 BPM | DIASTOLIC BLOOD PRESSURE: 59 MMHG

## 2024-08-26 DIAGNOSIS — M62.89 MYOTONIA: Primary | ICD-10-CM

## 2024-08-26 DIAGNOSIS — N81.10 CYSTOCELE WITH RECTOCELE: ICD-10-CM

## 2024-08-26 DIAGNOSIS — N81.6 CYSTOCELE WITH RECTOCELE: ICD-10-CM

## 2024-08-26 DIAGNOSIS — R39.14 FEELING OF INCOMPLETE BLADDER EMPTYING: ICD-10-CM

## 2024-08-26 DIAGNOSIS — R35.0 FREQUENCY OF URINATION: ICD-10-CM

## 2024-08-26 PROCEDURE — 97750 PHYSICAL PERFORMANCE TEST: CPT | Performed by: NURSE PRACTITIONER

## 2024-08-26 PROCEDURE — 91122 ANAL PRESSURE RECORD: CPT | Performed by: NURSE PRACTITIONER

## 2024-08-26 PROCEDURE — 51784 ANAL/URINARY MUSCLE STUDY: CPT | Performed by: NURSE PRACTITIONER

## 2024-08-26 PROCEDURE — 97032 APPL MODALITY 1+ESTIM EA 15: CPT | Performed by: NURSE PRACTITIONER

## 2024-08-26 NOTE — PROGRESS NOTES
PELVIC FLOOR REHAB FOLLOW UP    At last visit (PFR # 5, 1 weeks ago):  STIM ONLY     Home exercise regimen prescribed as documented at last visit:   Repetitions - 10  Contract - 10 sec   Relax - 10 sec   + 10 Quick Flicks  Frequency- 4 times daily     Stimulated with 59 average mA for 15minutes.   Symptoms  Daytime voiding frequency: q 1-2 hrs, unchanged  Nighttime voiding frequency: 0 times per night, unchanged  Urgency: mild, unchanged    Incontinence episodes per day: 0, Causes: none, unchanged  Number of pads used per day: 0, unchanged    Pelvic pain: unchanged  Pain with intercourse unchanged, uncomfortable but not painful    Bowel habits: unchanged  Fecal incontinence: unchanged, none    OVERALL IMPROVEMENT SINCE INITIAL SESSION:   moderate in degree    Compliance:  Has pt completed exercises as instructed: Yes    Changes Since Initial Visit  Intake of spicy/citrus/tomato based foods: decreased  Caffeine intake per day: unchanged  Fluid intake per day: unchanged  Alcohol intake: unchanged  Smoking: unchanged    Was able to hold for 10 seconds at moderate amplitude.   Fatigue after 10 repetitions.     Home exercise regimen prescribed:   Repetitions - 10   Contract - 10 sec   Relax - 10 sec   + 10 Quick Flicks  Frequency- 4 times daily    Stimulated with 53 average mA for 15 minutes.

## 2024-09-03 ENCOUNTER — PROCEDURE VISIT (OUTPATIENT)
Dept: UROLOGY | Age: 29
End: 2024-09-03
Payer: MEDICAID

## 2024-09-03 VITALS
WEIGHT: 133.6 LBS | HEART RATE: 88 BPM | TEMPERATURE: 98 F | HEIGHT: 63 IN | DIASTOLIC BLOOD PRESSURE: 64 MMHG | BODY MASS INDEX: 23.67 KG/M2 | SYSTOLIC BLOOD PRESSURE: 98 MMHG

## 2024-09-03 DIAGNOSIS — R35.0 FREQUENCY OF URINATION: ICD-10-CM

## 2024-09-03 DIAGNOSIS — N81.10 CYSTOCELE WITH RECTOCELE: ICD-10-CM

## 2024-09-03 DIAGNOSIS — M62.89 MYOTONIA: Primary | ICD-10-CM

## 2024-09-03 DIAGNOSIS — N81.6 CYSTOCELE WITH RECTOCELE: ICD-10-CM

## 2024-09-03 DIAGNOSIS — R39.14 FEELING OF INCOMPLETE BLADDER EMPTYING: ICD-10-CM

## 2024-09-03 PROCEDURE — 97032 APPL MODALITY 1+ESTIM EA 15: CPT | Performed by: NURSE PRACTITIONER

## 2024-09-03 PROCEDURE — 97750 PHYSICAL PERFORMANCE TEST: CPT | Performed by: NURSE PRACTITIONER

## 2024-09-03 PROCEDURE — 51784 ANAL/URINARY MUSCLE STUDY: CPT | Performed by: NURSE PRACTITIONER

## 2024-09-03 PROCEDURE — 91122 ANAL PRESSURE RECORD: CPT | Performed by: NURSE PRACTITIONER

## 2024-09-03 NOTE — PROGRESS NOTES
PELVIC FLOOR REHAB FOLLOW UP    At last visit (PFR # 6, 1 weeks ago):    Was able to hold for 10 seconds at moderate amplitude.   Fatigue after 10 repetitions.      Home exercise regimen prescribed:   Repetitions - 10   Contract - 10 sec   Relax - 10 sec   + 10 Quick Flicks  Frequency- 4 times daily     Stimulated with 53 average mA for 15 minutes.     Symptoms  Daytime voiding frequency: q 1-2 hrs, unchanged  Nighttime voiding frequency: 0 to 1 times per night, unchanged  Urgency: none, decreased    Incontinence episodes per day: 0, Causes: , unchanged  Number of pads used per day: 0, unchanged    Pelvic pain: none,unchanged    Bowel habits: normal/daily unchanged  Fecal incontinence: unchanged    OVERALL IMPROVEMENT SINCE INITIAL SESSION:   moderate    Compliance:  Has pt completed exercises as instructed: Yes    Changes Since Initial Visit  Intake of spicy/citrus/tomato based foods: unchanged  Caffeine intake per day: unchanged  Fluid intake per day: unchanged  Alcohol intake: unchanged  Smoking: unchanged      Was able to hold for 10 seconds at moderate-weak amplitude.   Fatigue after 10 repetitions.     Home exercise regimen prescribed:   Repetitions - 10   Contract - 10 sec   Relax - 10 sec   + 10 Quick Flicks  Frequency- 4 times daily    Stimulated with 55 average mA for 15 minutes.

## 2024-10-31 ENCOUNTER — OFFICE VISIT (OUTPATIENT)
Dept: UROLOGY | Age: 29
End: 2024-10-31

## 2024-10-31 VITALS
WEIGHT: 130 LBS | TEMPERATURE: 98.6 F | BODY MASS INDEX: 23.04 KG/M2 | DIASTOLIC BLOOD PRESSURE: 62 MMHG | SYSTOLIC BLOOD PRESSURE: 122 MMHG | HEIGHT: 63 IN

## 2024-10-31 DIAGNOSIS — N32.81 OVERACTIVE BLADDER: ICD-10-CM

## 2024-10-31 DIAGNOSIS — K59.00 CONSTIPATION, UNSPECIFIED CONSTIPATION TYPE: ICD-10-CM

## 2024-10-31 DIAGNOSIS — R35.0 FREQUENCY OF URINATION: Primary | ICD-10-CM

## 2024-10-31 RX ORDER — MIRABEGRON 50 MG/1
50 TABLET, FILM COATED, EXTENDED RELEASE ORAL DAILY
Qty: 90 TABLET | Refills: 3 | Status: SHIPPED | OUTPATIENT
Start: 2024-10-31

## 2024-10-31 NOTE — PROGRESS NOTES
History  Worsening frequency and urgency over the last several months. Nocturia 1-2 times at night.  She does have a bowel movement approximately every other day. Denies urinary incontinence. History of having frequent urinary tract infections.  She also has feelings of incomplete bladder emptying.  Was told by gynecology she has a urethrocele.  She does state that she can feel something \"bulging down there\".  She did not notice this until after her most recent delivery.  Patient is having no baseline pain.  She does have some discomfort.     3/2023 cystoscopy and pelvic exam showed no significant abnormality              Started oxybutynin 5mg                 Miralax daily     4/2023 continued urgency and frequency  increased oxybutynin to 10mg     1/2024 changed to trospium    4/2024 changed to vesicare per insurance    6/2024 She denies urgency or nocturia.  She does have frequency every 30 minutes to 1 hour, but states this is improved from every 5 minutes.  She is having a daily bowel movement with miralax.  She is having very heavy menstrual bleeding and feels this is due to her endometriosis.  She is working with gynecology.    Stop vesicare    Start myrbetriq    9/2024 completed 6 sessions of PFR.  At the completion of pelvic floor she was no longer complaining of urgency, incontinence or pelvic pain or sensation of pelvic fullness/bulging.  She did continue to have frequency every 1-2 hours during the day.  Nocturia 0-1 times per night.    Today  She is urinating every 2-3 hours during the day.  She has nocturia 0-1 times per night.  She denies any incontinence.  She denies any pelvic pain.  She denies any pelvic fullness or sensation of bulging.  She is having a daily bowel movement.  She is extremely happy with her improvement.  She does continue to perform pelvic floor exercises every day.    Plan  Continue Myrbetriq daily    Continue pelvic floor exercises at least once per day    Continue MiraLAX

## 2024-11-13 DIAGNOSIS — R35.0 FREQUENCY OF URINATION: ICD-10-CM

## 2024-11-13 DIAGNOSIS — N32.81 OVERACTIVE BLADDER: ICD-10-CM

## 2024-11-13 RX ORDER — MIRABEGRON 50 MG/1
50 TABLET, FILM COATED, EXTENDED RELEASE ORAL DAILY
Qty: 90 TABLET | Refills: 3 | OUTPATIENT
Start: 2024-11-13

## 2024-11-14 ENCOUNTER — OFFICE VISIT (OUTPATIENT)
Dept: OBGYN | Age: 29
End: 2024-11-14
Payer: MEDICAID

## 2024-11-14 ENCOUNTER — HOSPITAL ENCOUNTER (OUTPATIENT)
Age: 29
Discharge: HOME | End: 2024-11-14
Payer: MEDICAID

## 2024-11-14 VITALS
WEIGHT: 131.2 LBS | HEIGHT: 63 IN | DIASTOLIC BLOOD PRESSURE: 60 MMHG | BODY MASS INDEX: 23.25 KG/M2 | SYSTOLIC BLOOD PRESSURE: 112 MMHG

## 2024-11-14 DIAGNOSIS — F32.81 PMDD (PREMENSTRUAL DYSPHORIC DISORDER): Primary | ICD-10-CM

## 2024-11-14 DIAGNOSIS — F32.81 PMDD (PREMENSTRUAL DYSPHORIC DISORDER): ICD-10-CM

## 2024-11-14 LAB
T4 FREE SERPL-MCNC: 1.2 NG/DL (ref 0.92–1.68)
TSH SERPL DL<=0.05 MIU/L-ACNC: 1.32 UIU/ML (ref 0.27–4.2)

## 2024-11-14 PROCEDURE — 36415 COLL VENOUS BLD VENIPUNCTURE: CPT

## 2024-11-14 PROCEDURE — 84439 ASSAY OF FREE THYROXINE: CPT

## 2024-11-14 PROCEDURE — 99213 OFFICE O/P EST LOW 20 MIN: CPT | Performed by: ADVANCED PRACTICE MIDWIFE

## 2024-11-14 PROCEDURE — 84443 ASSAY THYROID STIM HORMONE: CPT

## 2024-11-14 PROCEDURE — 86376 MICROSOMAL ANTIBODY EACH: CPT

## 2024-11-14 RX ORDER — BUPROPION HYDROCHLORIDE 150 MG/1
150 TABLET ORAL EVERY MORNING
Qty: 30 TABLET | Refills: 1 | Status: SHIPPED | OUTPATIENT
Start: 2024-11-14

## 2024-11-14 RX ORDER — VITAMIN B COMPLEX
1 CAPSULE ORAL DAILY
COMMUNITY

## 2024-11-14 RX ORDER — MULTIVIT WITH MINERALS/LUTEIN
250 TABLET ORAL DAILY
COMMUNITY

## 2024-11-14 NOTE — PROGRESS NOTES
PROBLEM VISIT     Date of service: 2024    Delilah Durant  Is a 29 y.o. , female    PT's PCP is: Michael Sellers, APRN - CNP     : 1995                                             Subjective:       Patient's last menstrual period was 10/27/2024 (exact date).     OB History    Para Term  AB Living   3 2 2 0 1 2   SAB IAB Ectopic Molar Multiple Live Births   1 0 0 0 0 2      # Outcome Date GA Lbr Abraham/2nd Weight Sex Type Anes PTL Lv   3 Term 10/11/22 40w0d / 00:01 3.728 kg (8 lb 3.5 oz) M Vag-Spont None N AIMEE   2 Term 20 40w5d 04:29 / 00:15 3.413 kg (7 lb 8.4 oz) F Vag-Spont None N AIMEE      Complications: Post-dates pregnancy   1 SAB 2016 6w0d    SAB   ND        Social History     Tobacco Use   Smoking Status Never   Smokeless Tobacco Never        Social History     Substance and Sexual Activity   Alcohol Use No       Social History     Substance and Sexual Activity   Sexual Activity Yes    Partners: Male    Birth control/protection: Condom       Allergies: Patient has no known allergies.    Chief Complaint   Patient presents with    Mood Swings     Patient has concerns with fairly sever mood swings 1 week prior to period. Is easily irritated , anxious, tang and angry at times. Some months are worse than others. Symptoms are better as soon as period starts and have occurs for approx. Last 6 months.        Last Yearly date:  2024    Last pap date and results: 2024 negative    Last HPV date and results: 2024 negative    PE:  Vital Signs  Height 1.6 m (5' 3\"), weight 59.5 kg (131 lb 3.2 oz), last menstrual period 10/27/2024, not currently breastfeeding.  Estimated body mass index is 23.24 kg/m² as calculated from the following:    Height as of this encounter: 1.6 m (5' 3\").    Weight as of this encounter: 59.5 kg (131 lb 3.2 oz).    No data recorded      NURSE: FERDINAND Roberts LPN    HPI: here for premenstrual symptoms, specifically irritability.  Has become a stay at

## 2024-11-16 LAB — THYROPEROXIDASE AB SERPL IA-ACNC: <4 IU/ML (ref 0–25)

## 2024-12-11 ENCOUNTER — OFFICE VISIT (OUTPATIENT)
Dept: OBGYN | Age: 29
End: 2024-12-11
Payer: MEDICAID

## 2024-12-11 VITALS
HEIGHT: 63 IN | BODY MASS INDEX: 23.39 KG/M2 | DIASTOLIC BLOOD PRESSURE: 64 MMHG | SYSTOLIC BLOOD PRESSURE: 116 MMHG | WEIGHT: 132 LBS

## 2024-12-11 DIAGNOSIS — F32.81 PMDD (PREMENSTRUAL DYSPHORIC DISORDER): ICD-10-CM

## 2024-12-11 PROCEDURE — 99213 OFFICE O/P EST LOW 20 MIN: CPT | Performed by: ADVANCED PRACTICE MIDWIFE

## 2024-12-11 RX ORDER — BUPROPION HYDROCHLORIDE 150 MG/1
150 TABLET ORAL EVERY MORNING
Qty: 30 TABLET | Refills: 7 | Status: SHIPPED | OUTPATIENT
Start: 2024-12-11

## 2024-12-11 NOTE — PROGRESS NOTES
PROBLEM VISIT     Date of service: 2024    Delilah Durant  Is a 29 y.o. , female    PT's PCP is: Michael Sellers, APRN - CNP     : 1995                                             Subjective:       Patient's last menstrual period was 2024 (exact date).     OB History    Para Term  AB Living   3 2 2 0 1 2   SAB IAB Ectopic Molar Multiple Live Births   1 0 0 0 0 2      # Outcome Date GA Lbr Abraham/2nd Weight Sex Type Anes PTL Lv   3 Term 10/11/22 40w0d / 00:01 3.728 kg (8 lb 3.5 oz) M Vag-Spont None N AIMEE   2 Term 20 40w5d 04:29 / 00:15 3.413 kg (7 lb 8.4 oz) F Vag-Spont None N AIMEE      Complications: Post-dates pregnancy   1 SAB 2016 6w0d    SAB   ND        Social History     Tobacco Use   Smoking Status Never   Smokeless Tobacco Never        Social History     Substance and Sexual Activity   Alcohol Use No       Social History     Substance and Sexual Activity   Sexual Activity Yes    Partners: Male    Birth control/protection: Condom       Allergies: Patient has no known allergies.    Chief Complaint   Patient presents with    PMDD     Medication follow up. Patient currently taking Wellbutrin  mg. With good results.        Last Yearly date:  2024    Last pap date and results: 2024 negative    Last HPV date and results: 2024 negative    PE:  Vital Signs  Blood pressure 116/64, height 1.6 m (5' 3\"), weight 59.9 kg (132 lb), last menstrual period 2024, not currently breastfeeding.  Estimated body mass index is 23.38 kg/m² as calculated from the following:    Height as of this encounter: 1.6 m (5' 3\").    Weight as of this encounter: 59.9 kg (132 lb).    No data recorded      NURSE: Joan HERNANDEZ    HPI: here for med check wellbutrin, doing very well and desires continuation      PT denies fever, chills, nausea and vomiting       Objective: No acute distress  Excellent communications  Well-nourished     Results reviewed today:    No results

## 2024-12-27 ENCOUNTER — OFFICE VISIT (OUTPATIENT)
Dept: PRIMARY CARE CLINIC | Age: 29
End: 2024-12-27
Payer: MEDICAID

## 2024-12-27 VITALS
DIASTOLIC BLOOD PRESSURE: 70 MMHG | OXYGEN SATURATION: 99 % | SYSTOLIC BLOOD PRESSURE: 114 MMHG | BODY MASS INDEX: 23.51 KG/M2 | WEIGHT: 132.7 LBS | HEART RATE: 105 BPM | TEMPERATURE: 98.6 F

## 2024-12-27 DIAGNOSIS — R42 ORTHOSTATIC DIZZINESS: Primary | ICD-10-CM

## 2024-12-27 PROBLEM — F32.A DEPRESSION: Status: RESOLVED | Noted: 2021-09-15 | Resolved: 2024-12-27

## 2024-12-27 PROCEDURE — 99214 OFFICE O/P EST MOD 30 MIN: CPT | Performed by: NURSE PRACTITIONER

## 2024-12-27 SDOH — ECONOMIC STABILITY: FOOD INSECURITY: WITHIN THE PAST 12 MONTHS, THE FOOD YOU BOUGHT JUST DIDN'T LAST AND YOU DIDN'T HAVE MONEY TO GET MORE.: NEVER TRUE

## 2024-12-27 SDOH — ECONOMIC STABILITY: FOOD INSECURITY: WITHIN THE PAST 12 MONTHS, YOU WORRIED THAT YOUR FOOD WOULD RUN OUT BEFORE YOU GOT MONEY TO BUY MORE.: NEVER TRUE

## 2024-12-27 SDOH — ECONOMIC STABILITY: INCOME INSECURITY: HOW HARD IS IT FOR YOU TO PAY FOR THE VERY BASICS LIKE FOOD, HOUSING, MEDICAL CARE, AND HEATING?: NOT HARD AT ALL

## 2024-12-27 ASSESSMENT — PATIENT HEALTH QUESTIONNAIRE - PHQ9
3. TROUBLE FALLING OR STAYING ASLEEP: NOT AT ALL
10. IF YOU CHECKED OFF ANY PROBLEMS, HOW DIFFICULT HAVE THESE PROBLEMS MADE IT FOR YOU TO DO YOUR WORK, TAKE CARE OF THINGS AT HOME, OR GET ALONG WITH OTHER PEOPLE: NOT DIFFICULT AT ALL
5. POOR APPETITE OR OVEREATING: NOT AT ALL
SUM OF ALL RESPONSES TO PHQ QUESTIONS 1-9: 0
SUM OF ALL RESPONSES TO PHQ QUESTIONS 1-9: 0
6. FEELING BAD ABOUT YOURSELF - OR THAT YOU ARE A FAILURE OR HAVE LET YOURSELF OR YOUR FAMILY DOWN: NOT AT ALL
2. FEELING DOWN, DEPRESSED OR HOPELESS: NOT AT ALL
SUM OF ALL RESPONSES TO PHQ QUESTIONS 1-9: 0
9. THOUGHTS THAT YOU WOULD BE BETTER OFF DEAD, OR OF HURTING YOURSELF: NOT AT ALL
8. MOVING OR SPEAKING SO SLOWLY THAT OTHER PEOPLE COULD HAVE NOTICED. OR THE OPPOSITE, BEING SO FIGETY OR RESTLESS THAT YOU HAVE BEEN MOVING AROUND A LOT MORE THAN USUAL: NOT AT ALL
1. LITTLE INTEREST OR PLEASURE IN DOING THINGS: NOT AT ALL
SUM OF ALL RESPONSES TO PHQ9 QUESTIONS 1 & 2: 0
4. FEELING TIRED OR HAVING LITTLE ENERGY: NOT AT ALL
SUM OF ALL RESPONSES TO PHQ QUESTIONS 1-9: 0
7. TROUBLE CONCENTRATING ON THINGS, SUCH AS READING THE NEWSPAPER OR WATCHING TELEVISION: NOT AT ALL

## 2024-12-27 NOTE — PATIENT INSTRUCTIONS
SURVEY:     You may be receiving a survey from [facility] [employer] regarding your visit today.     Please complete the survey to enable us to provide the highest quality of care to you and your family.     If you cannot score us a very good on any question, please call the office to discuss how we could have made your experience a very good one.     Thank you,    Michael Sellers, APRN-CNP  Ирина Villalobos, APRN-CNP  Caity, LPN  Najma, CMA  Kane, CMA  Sowmya, CMA  Erika, PCA  Babs, CMA  Jennifer, PM

## 2024-12-27 NOTE — PROGRESS NOTES
Known Problems Brother     No Known Problems Brother        Review of Systems:     Positive and Negative as described in HPI    Review of Systems   Constitutional:  Negative for chills, fatigue and fever.   HENT:  Negative for congestion, rhinorrhea and sore throat.    Eyes:  Negative for visual disturbance.   Respiratory:  Negative for cough, shortness of breath and wheezing.    Cardiovascular:  Positive for palpitations. Negative for chest pain.   Gastrointestinal:  Negative for abdominal pain, constipation, diarrhea, nausea and vomiting.   Genitourinary:  Negative for difficulty urinating and dysuria.   Musculoskeletal:  Negative for gait problem, neck pain and neck stiffness.   Skin:  Negative for rash.   Neurological:  Positive for dizziness and light-headedness. Negative for tremors, seizures, syncope, facial asymmetry, speech difficulty, weakness, numbness and headaches.       Physical Exam:   Vitals:  /70 (Site: Left Upper Arm, Position: Standing, Cuff Size: Medium Adult)   Pulse (!) 105   Temp 98.6 °F (37 °C)   Wt 60.2 kg (132 lb 11.2 oz)   LMP 11/19/2024 (Exact Date)   SpO2 99%   BMI 23.51 kg/m²     Physical Exam  Vitals and nursing note reviewed.   Constitutional:       General: She is not in acute distress.     Appearance: Normal appearance. She is not ill-appearing.   HENT:      Right Ear: Tympanic membrane normal.      Left Ear: Tympanic membrane normal.      Mouth/Throat:      Mouth: Mucous membranes are moist.      Pharynx: Oropharynx is clear.   Eyes:      General: No scleral icterus.     Extraocular Movements: Extraocular movements intact.      Conjunctiva/sclera: Conjunctivae normal.      Pupils: Pupils are equal, round, and reactive to light.   Neck:      Vascular: No carotid bruit.   Cardiovascular:      Rate and Rhythm: Normal rate and regular rhythm.      Heart sounds: No murmur heard.  Pulmonary:      Effort: Pulmonary effort is normal.      Breath sounds: Normal breath sounds. No

## 2025-01-03 ENCOUNTER — HOSPITAL ENCOUNTER (OUTPATIENT)
Age: 30
End: 2025-01-03
Payer: MEDICAID

## 2025-01-03 DIAGNOSIS — R42 ORTHOSTATIC DIZZINESS: ICD-10-CM

## 2025-01-03 LAB
TILT CV INITIAL SUPINE HEART RATE: 92 BPM
TILT CV INITIAL SUPINE MAX BP: NORMAL BPM
TILT CV INITIAL SUPINE RHYTHM: NORMAL
TILT CV INITIAL TILT BLOOD PRESSURE: NORMAL MMHG
TILT CV INITIAL TILT HEART RATE: 111 BPM
TILT CV INITIAL TILT RHYTHM: NORMAL
TILT CV MAX BP BLOOD PRESSURE: NORMAL MMHG
TILT CV MAX BP HEART RATE: 111 BPM
TILT CV MAX BP MINUTES: 1
TILT CV MAX BP RHYTHM: NORMAL
TILT CV MAX HEART RATE: 128 BPM
TILT CV MAX HR BLOOD PRESSURE: NORMAL MMHG
TILT CV MAX HR MINUTES: 9
TILT CV MAX HR RHYTHM: NORMAL
TILT CV MINIMUM BP BLOOD PRESSURE: NORMAL MMHG
TILT CV MINIMUM BP HEART RATE: 92 BPM
TILT CV MINIMUM BP MINUTES: 21
TILT CV MINIMUM BP RHYTHM: NORMAL
TILT CV MINIMUM HR BP: NORMAL MMHG
TILT CV MINIMUM HR HEART RATE: 92 BPM
TILT CV MINIMUM HR MINUTES: 21
TILT CV MINIMUM HR RHYTHM: NORMAL

## 2025-01-03 PROCEDURE — 93660 TILT TABLE EVALUATION: CPT

## 2025-01-03 PROCEDURE — 93243 EXT ECG>48HR<7D SCAN A/R: CPT

## 2025-01-03 PROCEDURE — 93660 TILT TABLE EVALUATION: CPT | Performed by: INTERNAL MEDICINE

## 2025-01-04 ENCOUNTER — HOSPITAL ENCOUNTER (EMERGENCY)
Age: 30
Discharge: HOME | End: 2025-01-04
Attending: EMERGENCY MEDICINE
Payer: MEDICAID

## 2025-01-04 ENCOUNTER — APPOINTMENT (OUTPATIENT)
Dept: GENERAL RADIOLOGY | Age: 30
End: 2025-01-04
Payer: MEDICAID

## 2025-01-04 VITALS
WEIGHT: 126 LBS | HEART RATE: 100 BPM | DIASTOLIC BLOOD PRESSURE: 69 MMHG | OXYGEN SATURATION: 93 % | TEMPERATURE: 99 F | RESPIRATION RATE: 18 BRPM | SYSTOLIC BLOOD PRESSURE: 111 MMHG | BODY MASS INDEX: 22.32 KG/M2

## 2025-01-04 DIAGNOSIS — R55 NEAR SYNCOPE: Primary | ICD-10-CM

## 2025-01-04 DIAGNOSIS — R07.89 ATYPICAL CHEST PAIN: ICD-10-CM

## 2025-01-04 DIAGNOSIS — D64.9 ANEMIA, UNSPECIFIED TYPE: ICD-10-CM

## 2025-01-04 LAB
ALBUMIN SERPL-MCNC: 4.7 G/DL (ref 3.5–5.2)
ALBUMIN/GLOB SERPL: 1.8 {RATIO} (ref 1–2.5)
ALP SERPL-CCNC: 34 U/L (ref 35–104)
ALT SERPL-CCNC: 14 U/L (ref 10–35)
ANION GAP SERPL CALCULATED.3IONS-SCNC: 10 MMOL/L (ref 9–16)
AST SERPL-CCNC: 16 U/L (ref 10–35)
BASOPHILS # BLD: 0.03 K/UL (ref 0–0.2)
BASOPHILS NFR BLD: 1 % (ref 0–2)
BILIRUB SERPL-MCNC: 0.8 MG/DL (ref 0–1.2)
BUN SERPL-MCNC: 12 MG/DL (ref 6–20)
BUN/CREAT SERPL: 20 (ref 9–20)
CALCIUM SERPL-MCNC: 9.6 MG/DL (ref 8.6–10.4)
CHLORIDE SERPL-SCNC: 104 MMOL/L (ref 98–107)
CO2 SERPL-SCNC: 26 MMOL/L (ref 20–31)
CREAT SERPL-MCNC: 0.6 MG/DL (ref 0.5–0.9)
D DIMER PPP FEU-MCNC: <0.27 UG/ML FEU (ref 0–0.59)
EKG ATRIAL RATE: 97 BPM
EKG P AXIS: 63 DEGREES
EKG P-R INTERVAL: 126 MS
EKG Q-T INTERVAL: 320 MS
EKG QRS DURATION: 80 MS
EKG QTC CALCULATION (BAZETT): 406 MS
EKG R AXIS: 75 DEGREES
EKG T AXIS: -96 DEGREES
EKG VENTRICULAR RATE: 97 BPM
EOSINOPHIL # BLD: 0.06 K/UL (ref 0–0.44)
EOSINOPHILS RELATIVE PERCENT: 1 % (ref 1–4)
ERYTHROCYTE [DISTWIDTH] IN BLOOD BY AUTOMATED COUNT: 15.2 % (ref 11.8–14.4)
GFR, ESTIMATED: >90 ML/MIN/1.73M2
GLUCOSE SERPL-MCNC: 85 MG/DL (ref 74–99)
HCG SERPL QL: NEGATIVE
HCT VFR BLD AUTO: 33.5 % (ref 36.3–47.1)
HGB BLD-MCNC: 10.1 G/DL (ref 11.9–15.1)
IMM GRANULOCYTES # BLD AUTO: <0.03 K/UL (ref 0–0.3)
IMM GRANULOCYTES NFR BLD: 0 %
LYMPHOCYTES NFR BLD: 1.41 K/UL (ref 1.1–3.7)
LYMPHOCYTES RELATIVE PERCENT: 30 % (ref 24–43)
MCH RBC QN AUTO: 23.8 PG (ref 25.2–33.5)
MCHC RBC AUTO-ENTMCNC: 30.1 G/DL (ref 28.4–34.8)
MCV RBC AUTO: 78.8 FL (ref 82.6–102.9)
MONOCYTES NFR BLD: 0.46 K/UL (ref 0.1–1.2)
MONOCYTES NFR BLD: 10 % (ref 3–12)
NEUTROPHILS NFR BLD: 58 % (ref 36–65)
NEUTS SEG NFR BLD: 2.68 K/UL (ref 1.5–8.1)
NRBC BLD-RTO: 0 PER 100 WBC
PLATELET # BLD AUTO: 283 K/UL (ref 138–453)
PMV BLD AUTO: 10.1 FL (ref 8.1–13.5)
POTASSIUM SERPL-SCNC: 4.1 MMOL/L (ref 3.7–5.3)
PROT SERPL-MCNC: 7.3 G/DL (ref 6.6–8.7)
RBC # BLD AUTO: 4.25 M/UL (ref 3.95–5.11)
SODIUM SERPL-SCNC: 140 MMOL/L (ref 136–145)
TROPONIN I SERPL HS-MCNC: <6 NG/L (ref 0–14)
WBC OTHER # BLD: 4.7 K/UL (ref 3.5–11.3)

## 2025-01-04 PROCEDURE — 84703 CHORIONIC GONADOTROPIN ASSAY: CPT

## 2025-01-04 PROCEDURE — 2580000003 HC RX 258: Performed by: EMERGENCY MEDICINE

## 2025-01-04 PROCEDURE — 85379 FIBRIN DEGRADATION QUANT: CPT

## 2025-01-04 PROCEDURE — 96360 HYDRATION IV INFUSION INIT: CPT

## 2025-01-04 PROCEDURE — 99285 EMERGENCY DEPT VISIT HI MDM: CPT

## 2025-01-04 PROCEDURE — 84484 ASSAY OF TROPONIN QUANT: CPT

## 2025-01-04 PROCEDURE — 93005 ELECTROCARDIOGRAM TRACING: CPT | Performed by: EMERGENCY MEDICINE

## 2025-01-04 PROCEDURE — 85025 COMPLETE CBC W/AUTO DIFF WBC: CPT

## 2025-01-04 PROCEDURE — 80053 COMPREHEN METABOLIC PANEL: CPT

## 2025-01-04 PROCEDURE — 71045 X-RAY EXAM CHEST 1 VIEW: CPT

## 2025-01-04 RX ORDER — 0.9 % SODIUM CHLORIDE 0.9 %
1000 INTRAVENOUS SOLUTION INTRAVENOUS ONCE
Status: COMPLETED | OUTPATIENT
Start: 2025-01-04 | End: 2025-01-04

## 2025-01-04 RX ADMIN — SODIUM CHLORIDE 1000 ML: 9 INJECTION, SOLUTION INTRAVENOUS at 14:57

## 2025-01-04 ASSESSMENT — LIFESTYLE VARIABLES
HOW OFTEN DO YOU HAVE A DRINK CONTAINING ALCOHOL: NEVER
HOW MANY STANDARD DRINKS CONTAINING ALCOHOL DO YOU HAVE ON A TYPICAL DAY: PATIENT DOES NOT DRINK

## 2025-01-04 ASSESSMENT — PAIN DESCRIPTION - PAIN TYPE: TYPE: ACUTE PAIN

## 2025-01-04 ASSESSMENT — PAIN DESCRIPTION - DESCRIPTORS: DESCRIPTORS: DISCOMFORT

## 2025-01-04 ASSESSMENT — PAIN - FUNCTIONAL ASSESSMENT: PAIN_FUNCTIONAL_ASSESSMENT: 0-10

## 2025-01-04 ASSESSMENT — PAIN SCALES - GENERAL: PAINLEVEL_OUTOF10: 2

## 2025-01-04 ASSESSMENT — PAIN DESCRIPTION - LOCATION: LOCATION: CHEST

## 2025-01-04 NOTE — ED PROVIDER NOTES
[facility]  EMERGENCY DEPARTMENT ENCOUNTER      Pt Name: Delilah Durant  MRN: [id number]  Birthdate 1995  Date of evaluation: 1/4/2025  Provider: Dev Sepulveda MD  Time Note started  2:15 PM EST  1/4/25           NURSING NOTES REVIEWED     Pt evaluated in a timely manner      CURRENT MEDICATIONS       Previous Medications    ASCORBIC ACID (VITAMIN C) 250 MG TABLET    Take 1 tablet by mouth daily    B COMPLEX VITAMINS CAPSULE    Take 1 capsule by mouth daily    BUPROPION (WELLBUTRIN XL) 150 MG EXTENDED RELEASE TABLET    Take 1 tablet by mouth every morning    MAGNESIUM GLUCONATE (MAGNESIUM 27 PO)    Take by mouth    MIRABEGRON (MYRBETRIQ) 50 MG TB24    Take 50 mg by mouth daily    OMEGA-3 FATTY ACIDS (FP FISH OIL PO)    Take by mouth daily    POLYETHYLENE GLYCOL (GLYCOLAX) 17 GM/SCOOP POWDER    Take 17 g by mouth daily    PROBIOTIC PRODUCT (PROBIOTIC ADVANCED PO)    Take by mouth    VITAMIN D PO    Take by mouth daily       ALLERGIES     Patient has no known allergies.    FAMILY HISTORY       Family History   Problem Relation Age of Onset    Other Other         no family h/o stroke or ovarian cancer     Breast Cancer Maternal Aunt     No Known Problems Mother     No Known Problems Father     No Known Problems Brother     No Known Problems Maternal Grandmother     Cirrhosis Maternal Grandfather     Cancer Paternal Grandfather         Bone    No Known Problems Brother     No Known Problems Brother           SOCIAL HISTORY       Social History     Socioeconomic History    Marital status:      Spouse name: None    Number of children: None    Years of education: None    Highest education level: None   Tobacco Use    Smoking status: Never    Smokeless tobacco: Never   Vaping Use    Vaping status: Never Used   Substance and Sexual Activity    Alcohol use: No    Drug use: No    Sexual activity: Yes     Partners: Male     Birth control/protection: Condom     Social Determinants of Health

## 2025-01-04 NOTE — DISCHARGE INSTRUCTIONS
Please change positions slowly    Please call Dr. Brennan cardiologist office for follow-up (in addition to your healthcare provider

## 2025-01-05 NOTE — PROGRESS NOTES
Cardiology follow-up requested by the emergency room team.  Please call the patient to schedule an appointment.  Thank you

## 2025-01-06 ENCOUNTER — TELEPHONE (OUTPATIENT)
Dept: CARDIOLOGY | Age: 30
End: 2025-01-06

## 2025-01-06 ENCOUNTER — TELEPHONE (OUTPATIENT)
Dept: PRIMARY CARE CLINIC | Age: 30
End: 2025-01-06

## 2025-01-06 ENCOUNTER — HOSPITAL ENCOUNTER (OUTPATIENT)
Age: 30
Discharge: HOME | End: 2025-01-06
Payer: MEDICAID

## 2025-01-06 ENCOUNTER — TELEPHONE (OUTPATIENT)
Dept: OBGYN | Age: 30
End: 2025-01-06

## 2025-01-06 DIAGNOSIS — D50.9 MICROCYTIC ANEMIA: Primary | ICD-10-CM

## 2025-01-06 DIAGNOSIS — D50.9 MICROCYTIC ANEMIA: ICD-10-CM

## 2025-01-06 LAB
EKG ATRIAL RATE: 97 BPM
EKG P AXIS: 63 DEGREES
EKG P-R INTERVAL: 126 MS
EKG Q-T INTERVAL: 320 MS
EKG QRS DURATION: 80 MS
EKG QTC CALCULATION (BAZETT): 406 MS
EKG R AXIS: 75 DEGREES
EKG T AXIS: -96 DEGREES
EKG VENTRICULAR RATE: 97 BPM
FERRITIN SERPL-MCNC: 5 NG/ML (ref 15–150)
IMM RETICS NFR: 13 % (ref 2.7–18.3)
IRON SATN MFR SERPL: 53 % (ref 20–55)
IRON SERPL-MCNC: 174 UG/DL (ref 37–145)
RETIC HEMOGLOBIN: 24.9 PG (ref 28.2–35.7)
RETICS # AUTO: 0.04 M/UL (ref 0.03–0.08)
RETICS/RBC NFR AUTO: 0.9 % (ref 0.5–1.9)
TIBC SERPL-MCNC: 327 UG/DL (ref 250–450)
UNSATURATED IRON BINDING CAPACITY: 153 UG/DL (ref 112–347)
VIT B12 SERPL-MCNC: 632 PG/ML (ref 232–1245)

## 2025-01-06 PROCEDURE — 93010 ELECTROCARDIOGRAM REPORT: CPT | Performed by: INTERNAL MEDICINE

## 2025-01-06 PROCEDURE — 82607 VITAMIN B-12: CPT

## 2025-01-06 PROCEDURE — 83550 IRON BINDING TEST: CPT

## 2025-01-06 PROCEDURE — 82728 ASSAY OF FERRITIN: CPT

## 2025-01-06 PROCEDURE — 85045 AUTOMATED RETICULOCYTE COUNT: CPT

## 2025-01-06 PROCEDURE — 83540 ASSAY OF IRON: CPT

## 2025-01-06 PROCEDURE — 36415 COLL VENOUS BLD VENIPUNCTURE: CPT

## 2025-01-06 PROCEDURE — 82746 ASSAY OF FOLIC ACID SERUM: CPT

## 2025-01-06 ASSESSMENT — HEART SCORE: ECG: NON-SPECIFC REPOLARIZATION DISTURBANCE/LBTB/PM

## 2025-01-06 NOTE — TELEPHONE ENCOUNTER
----- Message from Dr. Ping Brennan MD sent at 1/5/2025  6:42 PM EST -----        ----- Message -----  From: Dev Sepulveda MD  Sent: 1/5/2025   4:05 AM EST  To: Ping Brennan MD

## 2025-01-06 NOTE — TELEPHONE ENCOUNTER
Patient was recently seen in the ER and it was discovered her Iron was low so she bought OTC Iron 45 mg supplements. She wanted to make sure she is taking the correct dose.

## 2025-01-06 NOTE — TELEPHONE ENCOUNTER
Patient called and left message that she is having \"side effects \" form Wellbutrin and would like to stop taking, however patient said you told her there were instructions how to stop. I called patient to further discuss what side effects she was having however no answer. Please advise.

## 2025-01-06 NOTE — TELEPHONE ENCOUNTER
[facility] Transitions Initial Follow Up Call    Outreach made within 2 business days of discharge: Yes    Patient: Delilah Durant Patient : 1995   MRN: [id number]  Reason for Admission: There are no discharge diagnoses documented for the most recent discharge.  Discharge Date: 25       Spoke with: Patient talked to the office (Najma)     Discharge department/facility: [facility]     TCM Interactive Patient Contact:  Was patient able to fill all prescriptions: Yes  Was patient instructed to bring all medications to the follow-up visit: Yes  Is patient taking all medications as directed in the discharge summary? Yes  Does patient understand their discharge instructions: Yes  Does patient have questions or concerns that need addressed prior to 7-14 day follow up office visit: no    Scheduled appointment with PCP within 7-14 days    Follow Up  Future Appointments   Date Time Provider Department Center   2025  2:40 PM Michael Sellers APRN - CNP [facility]   2025  9:00 AM Ping Brennan MD [facility]   2025  9:15 AM Yamileth Roberts APRN - [facility]   10/30/2025  1:00 PM Marietta Hernandez APRN - CNP [facility]       Sowmya Vincent MA

## 2025-01-06 NOTE — TELEPHONE ENCOUNTER
Patient states she feels she is more anxious rapid heart rate. . Feels dizzy at times however had recent tilt table performed and \"I failed it\" , patient will be seeing cardiologist. Was also in ED and is anemic. Will be taking iron PO. Please advise

## 2025-01-06 NOTE — TELEPHONE ENCOUNTER
I see she has some anemia but I don't see where they did any iron studies. Would recommend getting additional labs and then we can decide on iron dose. Ok to continue what she has for now.

## 2025-01-07 DIAGNOSIS — R79.0 LOW FERRITIN: ICD-10-CM

## 2025-01-07 DIAGNOSIS — D64.9 ANEMIA, UNSPECIFIED TYPE: Primary | ICD-10-CM

## 2025-01-07 LAB — FOLATE SERPL-MCNC: 26.1 NG/ML (ref 4.8–24.2)

## 2025-01-09 ENCOUNTER — TELEPHONE (OUTPATIENT)
Dept: PRIMARY CARE CLINIC | Age: 30
End: 2025-01-09

## 2025-01-09 ENCOUNTER — OFFICE VISIT (OUTPATIENT)
Dept: ONCOLOGY | Age: 30
End: 2025-01-09

## 2025-01-09 VITALS
TEMPERATURE: 98.2 F | BODY MASS INDEX: 23.57 KG/M2 | HEART RATE: 109 BPM | WEIGHT: 133 LBS | RESPIRATION RATE: 18 BRPM | DIASTOLIC BLOOD PRESSURE: 71 MMHG | SYSTOLIC BLOOD PRESSURE: 106 MMHG | HEIGHT: 63 IN

## 2025-01-09 DIAGNOSIS — N92.0 MENORRHAGIA WITH REGULAR CYCLE: ICD-10-CM

## 2025-01-09 DIAGNOSIS — D50.0 IRON DEFICIENCY ANEMIA DUE TO CHRONIC BLOOD LOSS: ICD-10-CM

## 2025-01-09 DIAGNOSIS — N80.9 ENDOMETRIOSIS: ICD-10-CM

## 2025-01-09 DIAGNOSIS — R00.0 TACHYCARDIA: ICD-10-CM

## 2025-01-09 DIAGNOSIS — R79.89 HIGH SERUM FERRITIN: Primary | ICD-10-CM

## 2025-01-09 RX ORDER — SODIUM CHLORIDE 9 MG/ML
INJECTION, SOLUTION INTRAVENOUS CONTINUOUS
OUTPATIENT
Start: 2025-01-16

## 2025-01-09 RX ORDER — UREA 10 %
45 LOTION (ML) TOPICAL DAILY
COMMUNITY

## 2025-01-09 RX ORDER — ONDANSETRON 2 MG/ML
8 INJECTION INTRAMUSCULAR; INTRAVENOUS
OUTPATIENT
Start: 2025-01-16

## 2025-01-09 RX ORDER — SODIUM CHLORIDE 9 MG/ML
5-250 INJECTION, SOLUTION INTRAVENOUS PRN
OUTPATIENT
Start: 2025-01-16

## 2025-01-09 RX ORDER — ALBUTEROL SULFATE 90 UG/1
4 INHALANT RESPIRATORY (INHALATION) PRN
OUTPATIENT
Start: 2025-01-16

## 2025-01-09 RX ORDER — FAMOTIDINE 10 MG/ML
20 INJECTION, SOLUTION INTRAVENOUS
OUTPATIENT
Start: 2025-01-16

## 2025-01-09 RX ORDER — HEPARIN SODIUM (PORCINE) LOCK FLUSH IV SOLN 100 UNIT/ML 100 UNIT/ML
500 SOLUTION INTRAVENOUS PRN
OUTPATIENT
Start: 2025-01-16

## 2025-01-09 RX ORDER — ACETAMINOPHEN 325 MG/1
650 TABLET ORAL
OUTPATIENT
Start: 2025-01-16

## 2025-01-09 RX ORDER — EPINEPHRINE 1 MG/ML
0.3 INJECTION, SOLUTION, CONCENTRATE INTRAVENOUS PRN
OUTPATIENT
Start: 2025-01-16

## 2025-01-09 RX ORDER — HYDROCORTISONE SODIUM SUCCINATE 100 MG/2ML
100 INJECTION INTRAMUSCULAR; INTRAVENOUS
OUTPATIENT
Start: 2025-01-16

## 2025-01-09 RX ORDER — DIPHENHYDRAMINE HYDROCHLORIDE 50 MG/ML
50 INJECTION INTRAMUSCULAR; INTRAVENOUS
OUTPATIENT
Start: 2025-01-16

## 2025-01-09 RX ORDER — SODIUM CHLORIDE 0.9 % (FLUSH) 0.9 %
5-40 SYRINGE (ML) INJECTION PRN
OUTPATIENT
Start: 2025-01-16

## 2025-01-09 NOTE — PROGRESS NOTES
Patient ID: Delilah Durant, 1995, [id number], 29 y.o.  Referred by :  No ref. provider found   Reason for consultation: Low ferritin/iron deficient anemia      HISTORY OF PRESENT ILLNESS:    Oncologic History:    Delilah Durant is a very pleasant 29 y.o. female.  Who was seen by primary care physician for dizziness, labs done on January 2025, WBC at 4.7 hemoglobin 10.1, MCV 78.8 and iron panel with ferritin at 5 and saturation 53 with borderline low absolute reticulocyte count  Patient did have dizziness and tachycardia and concern of this is related to the anemia  She describes a heavy menstrual cycle due to endometriosis even recently it slowing down    Past Medical History:   Diagnosis Date    Abnormal Pap smear of cervix     Anemia     Depression 09/15/2021    Endometriosis     Overactive bladder 4/13/2023       Past Surgical History:   Procedure Laterality Date    APPENDECTOMY      COLPOSCOPY      LAPAROSCOPY      x2    TONSILLECTOMY         No Known Allergies    Current Outpatient Medications   Medication Sig Dispense Refill    ferrous sulfate ER (SLOW FE) 45 MG TBCR extended release tablet Take 1 tablet by mouth Daily      Ascorbic Acid (VITAMIN C) 250 MG tablet Take 2 tablets by mouth daily      b complex vitamins capsule Take 1 capsule by mouth daily      Magnesium Gluconate (MAGNESIUM 27 PO) Take 250 mg by mouth daily      mirabegron (MYRBETRIQ) 50 MG TB24 Take 50 mg by mouth daily 90 tablet 3    polyethylene glycol (GLYCOLAX) 17 GM/SCOOP powder Take 17 g by mouth daily 1850 g 3    Omega-3 Fatty Acids (FP FISH OIL PO) Take by mouth daily      VITAMIN D PO Take 25 mcg by mouth daily      buPROPion (WELLBUTRIN XL) 150 MG extended release tablet Take 1 tablet by mouth every morning (Patient not taking: Reported on 1/9/2025) 30 tablet 7    Probiotic Product (PROBIOTIC ADVANCED PO) Take by mouth (Patient not taking: Reported on 1/9/2025)       No current facility-administered medications for this

## 2025-01-09 NOTE — TELEPHONE ENCOUNTER
----- Message from GERMANIA Pepe CNP sent at 1/7/2025  1:18 PM EST -----  How long has she been taking the current iron supplement?  Iron levels are actually normal, however her ferritin is low.  Most likely will refer her to hematology also for evaluation.  Thank you.

## 2025-01-14 ENCOUNTER — TELEPHONE (OUTPATIENT)
Dept: PRIMARY CARE CLINIC | Age: 30
End: 2025-01-14

## 2025-01-14 DIAGNOSIS — G90.A POTS (POSTURAL ORTHOSTATIC TACHYCARDIA SYNDROME): Primary | ICD-10-CM

## 2025-01-14 RX ORDER — METOPROLOL SUCCINATE 25 MG/1
25 TABLET, EXTENDED RELEASE ORAL DAILY
Qty: 30 TABLET | Refills: 0 | Status: SHIPPED | OUTPATIENT
Start: 2025-01-14

## 2025-01-14 NOTE — TELEPHONE ENCOUNTER
Patient notified and verbalized understanding. Patient states she had stopped taking her wellbutrin and it has helped tremendously with her heart rate. Patient to discuss at appointment on the 27th.

## 2025-01-14 NOTE — TELEPHONE ENCOUNTER
----- Message from GERMANIA Pepe CNP sent at 1/14/2025  4:40 PM EST -----  Aced on her cardiac monitor I would like her to start metoprolol succinate 25 mg daily.  She could take this at night if she wishes.  Just be consistent.  We will follow-up at her visit on the 27th.  Sooner if any issues.

## 2025-01-16 ENCOUNTER — OFFICE VISIT (OUTPATIENT)
Dept: PRIMARY CARE CLINIC | Age: 30
End: 2025-01-16
Payer: MEDICAID

## 2025-01-16 VITALS
HEART RATE: 94 BPM | DIASTOLIC BLOOD PRESSURE: 80 MMHG | BODY MASS INDEX: 23.56 KG/M2 | SYSTOLIC BLOOD PRESSURE: 126 MMHG | OXYGEN SATURATION: 98 % | WEIGHT: 133 LBS | TEMPERATURE: 98.9 F

## 2025-01-16 DIAGNOSIS — J06.9 ACUTE URI: Primary | ICD-10-CM

## 2025-01-16 LAB
INFLUENZA A ANTIGEN, POC: NEGATIVE
INFLUENZA B ANTIGEN, POC: NEGATIVE
LOT NUMBER POC: NORMAL
SARS-COV-2 RNA POC - COV: NORMAL
VALID INTERNAL CONTROL, POC: PRESENT
VENDOR AND KIT NAME POC: NORMAL

## 2025-01-16 PROCEDURE — 99213 OFFICE O/P EST LOW 20 MIN: CPT | Performed by: NURSE PRACTITIONER

## 2025-01-16 RX ORDER — AMOXICILLIN 500 MG/1
500 CAPSULE ORAL 2 TIMES DAILY
Qty: 20 CAPSULE | Refills: 0 | Status: SHIPPED | OUTPATIENT
Start: 2025-01-16 | End: 2025-01-26

## 2025-01-16 SDOH — ECONOMIC STABILITY: FOOD INSECURITY: WITHIN THE PAST 12 MONTHS, YOU WORRIED THAT YOUR FOOD WOULD RUN OUT BEFORE YOU GOT MONEY TO BUY MORE.: NEVER TRUE

## 2025-01-16 SDOH — ECONOMIC STABILITY: FOOD INSECURITY: WITHIN THE PAST 12 MONTHS, THE FOOD YOU BOUGHT JUST DIDN'T LAST AND YOU DIDN'T HAVE MONEY TO GET MORE.: NEVER TRUE

## 2025-01-16 ASSESSMENT — PATIENT HEALTH QUESTIONNAIRE - PHQ9
1. LITTLE INTEREST OR PLEASURE IN DOING THINGS: NOT AT ALL
5. POOR APPETITE OR OVEREATING: NOT AT ALL
SUM OF ALL RESPONSES TO PHQ QUESTIONS 1-9: 0
10. IF YOU CHECKED OFF ANY PROBLEMS, HOW DIFFICULT HAVE THESE PROBLEMS MADE IT FOR YOU TO DO YOUR WORK, TAKE CARE OF THINGS AT HOME, OR GET ALONG WITH OTHER PEOPLE: NOT DIFFICULT AT ALL
6. FEELING BAD ABOUT YOURSELF - OR THAT YOU ARE A FAILURE OR HAVE LET YOURSELF OR YOUR FAMILY DOWN: NOT AT ALL
3. TROUBLE FALLING OR STAYING ASLEEP: NOT AT ALL
SUM OF ALL RESPONSES TO PHQ9 QUESTIONS 1 & 2: 0
2. FEELING DOWN, DEPRESSED OR HOPELESS: NOT AT ALL
4. FEELING TIRED OR HAVING LITTLE ENERGY: NOT AT ALL
SUM OF ALL RESPONSES TO PHQ QUESTIONS 1-9: 0
7. TROUBLE CONCENTRATING ON THINGS, SUCH AS READING THE NEWSPAPER OR WATCHING TELEVISION: NOT AT ALL
SUM OF ALL RESPONSES TO PHQ QUESTIONS 1-9: 0
8. MOVING OR SPEAKING SO SLOWLY THAT OTHER PEOPLE COULD HAVE NOTICED. OR THE OPPOSITE, BEING SO FIGETY OR RESTLESS THAT YOU HAVE BEEN MOVING AROUND A LOT MORE THAN USUAL: NOT AT ALL
SUM OF ALL RESPONSES TO PHQ QUESTIONS 1-9: 0
9. THOUGHTS THAT YOU WOULD BE BETTER OFF DEAD, OR OF HURTING YOURSELF: NOT AT ALL

## 2025-01-16 ASSESSMENT — ENCOUNTER SYMPTOMS
SINUS PAIN: 1
NAUSEA: 0
VOMITING: 0
DIARRHEA: 0
RHINORRHEA: 1
COUGH: 1
SORE THROAT: 0
ABDOMINAL PAIN: 0
SWOLLEN GLANDS: 0
WHEEZING: 0

## 2025-01-16 NOTE — PATIENT INSTRUCTIONS
SURVEY:     You may be receiving a survey from [facility] [employer] regarding your visit today.     Please complete the survey to enable us to provide the highest quality of care to you and your family.     If you cannot score us a very good on any question, please call the office to discuss how we could have made your experience a very good one.     Thank you,    Michael Sellers, APRN-CNP  Ирина Villalobos, APRN-CNP  Caity, LPN  Namja, CMA  Kane, CMA  Sowmya, CMA  Erika, PCA  Babs, CMA  Jennifer, PM

## 2025-01-16 NOTE — PROGRESS NOTES
Name: Delilah Durant  : 1995         Chief Complaint:     Chief Complaint   Patient presents with    Cough     Cough, fatigue, drainage, x 2 days.        History of Present Illness:      Delilah Durant is a 29 y.o.  female who presents with Cough (Cough, fatigue, drainage, x 2 days. )      Delilah is here today for an acute care office visit.    Cold Symptoms   This is a new problem. The current episode started in the past 7 days. The problem has been gradually worsening. There has been no fever. Associated symptoms include congestion, coughing, headaches, a plugged ear sensation, rhinorrhea, sinus pain and sneezing. Pertinent negatives include no abdominal pain, chest pain, diarrhea, dysuria, ear pain, joint pain, joint swelling, nausea, neck pain, rash, sore throat, swollen glands, vomiting or wheezing. She has tried nothing for the symptoms. The treatment provided no relief.         Past Medical History:     Past Medical History:   Diagnosis Date    Abnormal Pap smear of cervix     Anemia     Depression 09/15/2021    Endometriosis     Overactive bladder 2023      Reviewed all health maintenance requirements and ordered appropriate tests  There are no preventive care reminders to display for this patient.    Past Surgical History:     Past Surgical History:   Procedure Laterality Date    APPENDECTOMY      COLPOSCOPY      LAPAROSCOPY      x2    TONSILLECTOMY          Medications:       Prior to Admission medications    Medication Sig Start Date End Date Taking? Authorizing Provider   amoxicillin (AMOXIL) 500 MG capsule Take 1 capsule by mouth 2 times daily for 10 days 25 Yes Michael Sellers APRN - CNP   metoprolol succinate (TOPROL XL) 25 MG extended release tablet Take 1 tablet by mouth daily 25  Yes Michael Sellers APRN - CNP   ferrous sulfate ER (SLOW FE) 45 MG TBCR extended release tablet Take 1 tablet by mouth Daily   Yes Provider, MD Wil   Ascorbic Acid (VITAMIN C) 250 MG

## 2025-01-27 ENCOUNTER — OFFICE VISIT (OUTPATIENT)
Dept: PRIMARY CARE CLINIC | Age: 30
End: 2025-01-27
Payer: MEDICAID

## 2025-01-27 VITALS
WEIGHT: 135.7 LBS | OXYGEN SATURATION: 98 % | HEART RATE: 93 BPM | SYSTOLIC BLOOD PRESSURE: 116 MMHG | DIASTOLIC BLOOD PRESSURE: 70 MMHG | BODY MASS INDEX: 24.04 KG/M2 | TEMPERATURE: 98 F

## 2025-01-27 DIAGNOSIS — D50.9 MICROCYTIC ANEMIA: ICD-10-CM

## 2025-01-27 DIAGNOSIS — R42 ORTHOSTATIC DIZZINESS: Primary | ICD-10-CM

## 2025-01-27 PROCEDURE — 99214 OFFICE O/P EST MOD 30 MIN: CPT | Performed by: NURSE PRACTITIONER

## 2025-01-27 SDOH — ECONOMIC STABILITY: FOOD INSECURITY: WITHIN THE PAST 12 MONTHS, THE FOOD YOU BOUGHT JUST DIDN'T LAST AND YOU DIDN'T HAVE MONEY TO GET MORE.: NEVER TRUE

## 2025-01-27 SDOH — ECONOMIC STABILITY: FOOD INSECURITY: WITHIN THE PAST 12 MONTHS, YOU WORRIED THAT YOUR FOOD WOULD RUN OUT BEFORE YOU GOT MONEY TO BUY MORE.: NEVER TRUE

## 2025-01-27 ASSESSMENT — ENCOUNTER SYMPTOMS
COUGH: 0
WHEEZING: 0
ABDOMINAL PAIN: 0
NAUSEA: 0
SHORTNESS OF BREATH: 0
RHINORRHEA: 0
DIARRHEA: 0
SORE THROAT: 0
VOMITING: 0
CONSTIPATION: 0

## 2025-01-27 ASSESSMENT — PATIENT HEALTH QUESTIONNAIRE - PHQ9
4. FEELING TIRED OR HAVING LITTLE ENERGY: NOT AT ALL
SUM OF ALL RESPONSES TO PHQ QUESTIONS 1-9: 0
10. IF YOU CHECKED OFF ANY PROBLEMS, HOW DIFFICULT HAVE THESE PROBLEMS MADE IT FOR YOU TO DO YOUR WORK, TAKE CARE OF THINGS AT HOME, OR GET ALONG WITH OTHER PEOPLE: NOT DIFFICULT AT ALL
1. LITTLE INTEREST OR PLEASURE IN DOING THINGS: NOT AT ALL
SUM OF ALL RESPONSES TO PHQ QUESTIONS 1-9: 0
8. MOVING OR SPEAKING SO SLOWLY THAT OTHER PEOPLE COULD HAVE NOTICED. OR THE OPPOSITE, BEING SO FIGETY OR RESTLESS THAT YOU HAVE BEEN MOVING AROUND A LOT MORE THAN USUAL: NOT AT ALL
9. THOUGHTS THAT YOU WOULD BE BETTER OFF DEAD, OR OF HURTING YOURSELF: NOT AT ALL
6. FEELING BAD ABOUT YOURSELF - OR THAT YOU ARE A FAILURE OR HAVE LET YOURSELF OR YOUR FAMILY DOWN: NOT AT ALL
SUM OF ALL RESPONSES TO PHQ9 QUESTIONS 1 & 2: 0
7. TROUBLE CONCENTRATING ON THINGS, SUCH AS READING THE NEWSPAPER OR WATCHING TELEVISION: NOT AT ALL
5. POOR APPETITE OR OVEREATING: NOT AT ALL
SUM OF ALL RESPONSES TO PHQ QUESTIONS 1-9: 0
3. TROUBLE FALLING OR STAYING ASLEEP: NOT AT ALL
2. FEELING DOWN, DEPRESSED OR HOPELESS: NOT AT ALL
SUM OF ALL RESPONSES TO PHQ QUESTIONS 1-9: 0

## 2025-01-27 NOTE — PROGRESS NOTES
Name: Delilah Duratn  : 1995         Chief Complaint:     Chief Complaint   Patient presents with    Dizziness     4 week check, patient not taking metoprolol, stopped taking wellbutrin and started taking IRON pill. Patient states doing better since stopping wellbutrin.       History of Present Illness:      Delilah Durant is a 29 y.o.  female who presents with Dizziness (4 week check, patient not taking metoprolol, stopped taking wellbutrin and started taking IRON pill. Patient states doing better since stopping wellbutrin.)      Delilah is here today for a routine office visit.    She states he is feeling very well.  She started iron replacement per hematology and states her dizziness is improved greatly.  She has not started the metoprolol and is going to hold off until she sees cardiology.  See below for further comments.    Dizziness  Quality:  Lightheadedness and imbalance  Severity:  Moderate  Onset quality:  Sudden  Timing:  Intermittent  Progression:  Unchanged  Chronicity:  Recurrent  Context: standing up    Relieved by:  Being still (IRON REPLACEMENT)  Worsened by:  Nothing  Ineffective treatments:  None tried  Associated symptoms: no chest pain, no diarrhea, no headaches, no nausea, no palpitations, no shortness of breath, no vomiting and no weakness    Risk factors: no anemia, no heart disease, no hx of stroke, no hx of vertigo, no Meniere's disease, no multiple medications and no new medications    Palpitations   This is a recurrent problem. The current episode started more than 1 month ago. The problem occurs rarely. The problem has been rapidly improving. Episode Length: SECONDS. Pertinent negatives include no chest pain, coughing, dizziness, fever, nausea, numbness, shortness of breath, vomiting or weakness. Treatments tried: REST. The treatment provided moderate relief. There are no known risk factors. There is no history of anemia, anxiety, drug use, heart disease, hyperthyroidism or a valve

## 2025-01-27 NOTE — PATIENT INSTRUCTIONS
SURVEY:     You may be receiving a survey from [facility] [email or web address] regarding your visit today.     Please complete the survey to enable us to provide the highest quality of care to you and your family.     If you cannot score us a very good on any question, please call the office to discuss how we could have made your experience a very good one.     Thank you,    Michael Sellers, APRN-CNP  Ирина Villalobos, APRN-CNP  Caity, LPN  Najma, CMA  Kane, CMA  Sowmya, CMA  Erika, PCA  Babs, CMA  Jennifer, PM

## 2025-01-31 ENCOUNTER — TELEPHONE (OUTPATIENT)
Dept: INFUSION THERAPY | Age: 30
End: 2025-01-31

## 2025-01-31 NOTE — TELEPHONE ENCOUNTER
Called patient to schedule iron infusion. Pt states she is feeling immensely better. She states she actually forgot about the iron infusion that was ordered. She states she has been taking the oral iron supplement and is doing fine with it. She was wondering if she could continue with the oral iron and have her labs rechecked before her appt in March and wait on the IV iron until those recheck labs are available.  Checked with Nicole STEWART charge nurse who states she will talk with the doctor, but does not think he will mind this plan. Advised patient to call back if her condition should change and she is not feeling well. Pt verbalized understanding.

## 2025-02-03 ENCOUNTER — TELEPHONE (OUTPATIENT)
Dept: ONCOLOGY | Age: 30
End: 2025-02-03

## 2025-02-03 NOTE — TELEPHONE ENCOUNTER
Called patient to inform her that per her request due to feeling much better and taking oral iron that Dr. Ramos states do not need to complete IV infusion at this time.  Informed to call if questions or issues.  Will see with labs and follow up 3/24/25.

## 2025-02-18 ENCOUNTER — OFFICE VISIT (OUTPATIENT)
Dept: CARDIOLOGY | Age: 30
End: 2025-02-18
Payer: MEDICAID

## 2025-02-18 VITALS
OXYGEN SATURATION: 98 % | HEIGHT: 63 IN | WEIGHT: 134.6 LBS | BODY MASS INDEX: 23.85 KG/M2 | DIASTOLIC BLOOD PRESSURE: 77 MMHG | HEART RATE: 109 BPM | SYSTOLIC BLOOD PRESSURE: 111 MMHG | RESPIRATION RATE: 18 BRPM

## 2025-02-18 DIAGNOSIS — R42 ORTHOSTATIC DIZZINESS: Primary | ICD-10-CM

## 2025-02-18 DIAGNOSIS — R00.0 TACHYCARDIA: ICD-10-CM

## 2025-02-18 DIAGNOSIS — R00.2 PALPITATION: ICD-10-CM

## 2025-02-18 DIAGNOSIS — D50.0 IRON DEFICIENCY ANEMIA DUE TO CHRONIC BLOOD LOSS: ICD-10-CM

## 2025-02-18 PROCEDURE — 99214 OFFICE O/P EST MOD 30 MIN: CPT | Performed by: INTERNAL MEDICINE

## 2025-02-18 RX ORDER — FLUDROCORTISONE ACETATE 0.1 MG/1
0.1 TABLET ORAL DAILY
Qty: 90 TABLET | Refills: 3 | Status: SHIPPED | OUTPATIENT
Start: 2025-02-18 | End: 2026-02-13

## 2025-02-18 NOTE — PROGRESS NOTES
I, Tete Vazquez am scribing for and in the presence of Ping Brennan MD, F.A.C.C..    Patient: Delilah Durant  : 1995  Date of Visit: 2025    REASON FOR VISIT / CONSULTATION: Establish Cardiologist (HX:syncope Pt is here to est care for ER visit on 25 she had near syncope spell after tilt table test was done. She said she has anemia. She still feels dizzy when standing, occasional palp Denies:CP, sob, )      History of Present Illness:        Dear Michael Sellers, APRN - CNP,     I had the pleasure of seeing  Delilah Durant in my office today. Ms. Durant is a 29 y.o. female with a recent history of intermittent lightheadedness and dizziness.    She denies any family history of heart disease.  She denies having diabetes, hypertension or hyperlipidemia.    CAM monitor done on 2025: Predominant rhythm: NSR  Average HR of 97 bpm. Multiple symptoms were reported and were associated with sinus tachycardia in the 100 to 130 bpm range.     Tilt table test done on 1/3/2025: Study Conclusions: Abnormal head upright tilt study. The patient's heart rate, blood pressure response and symptoms were most consistent with Orthostatic intolerance.  Blood pressure dropped to 80/59 mmHg with a heart rate of 92 bpm associated with symptoms of presyncope after 21 minutes into the test.     Ms. Durant is here to establish care for ER visit on 2025. She had tilt table test the day before and started having chest pain and near syncope spell. She states she has started iron therapy and has been feeling better. She also mentions she stopped Wellbutrin and it was causing elevated heart rate. She drinks over 80 ounces of water daily. She also drinks a couple cups of coffee daily. She is very active she walks 6-7 days a week for a hour on a walking path. She has 2 and 4 year old children. She denies having diabetes or hypertension during pregnancies. She has started eating and exercising over the last year and

## 2025-03-11 ENCOUNTER — HOSPITAL ENCOUNTER (OUTPATIENT)
Age: 30
Discharge: HOME | End: 2025-03-13
Attending: INTERNAL MEDICINE
Payer: MEDICAID

## 2025-03-11 VITALS
BODY MASS INDEX: 23.87 KG/M2 | HEIGHT: 63 IN | DIASTOLIC BLOOD PRESSURE: 77 MMHG | WEIGHT: 134.7 LBS | SYSTOLIC BLOOD PRESSURE: 111 MMHG

## 2025-03-11 DIAGNOSIS — D50.0 IRON DEFICIENCY ANEMIA DUE TO CHRONIC BLOOD LOSS: ICD-10-CM

## 2025-03-11 DIAGNOSIS — R42 ORTHOSTATIC DIZZINESS: ICD-10-CM

## 2025-03-11 DIAGNOSIS — R00.2 PALPITATION: ICD-10-CM

## 2025-03-11 DIAGNOSIS — R00.0 TACHYCARDIA: ICD-10-CM

## 2025-03-11 LAB
ECHO AO ASC DIAM: 2.4 CM
ECHO AO ASCENDING AORTA INDEX: 1.47 CM/M2
ECHO AO ROOT DIAM: 1.7 CM
ECHO AO ROOT INDEX: 1.04 CM/M2
ECHO AO SINUS VALSALVA DIAM: 2.6 CM
ECHO AO SINUS VALSALVA INDEX: 1.6 CM/M2
ECHO AO ST JNCT DIAM: 1.7 CM
ECHO AV CUSP MM: 2 CM
ECHO AV MEAN GRADIENT: 3 MMHG
ECHO AV MEAN VELOCITY: 0.7 M/S
ECHO AV PEAK GRADIENT: 5 MMHG
ECHO AV PEAK VELOCITY: 1.1 M/S
ECHO AV VTI: 20.5 CM
ECHO BSA: 1.65 M2
ECHO EST RA PRESSURE: 3 MMHG
ECHO LA AREA 2C: 13.5 CM2
ECHO LA AREA 4C: 12.3 CM2
ECHO LA MAJOR AXIS: 4.1 CM
ECHO LA MINOR AXIS: 4.9 CM
ECHO LA VOL BP: 33 ML (ref 22–52)
ECHO LA VOL MOD A2C: 30 ML (ref 22–52)
ECHO LA VOL MOD A4C: 30 ML (ref 22–52)
ECHO LA VOL/BSA BIPLANE: 20 ML/M2 (ref 16–34)
ECHO LA VOLUME INDEX MOD A2C: 18 ML/M2 (ref 16–34)
ECHO LA VOLUME INDEX MOD A4C: 18 ML/M2 (ref 16–34)
ECHO LV E' LATERAL VELOCITY: 17.4 CM/S
ECHO LV EDV A2C: 75 ML
ECHO LV EDV A4C: 51 ML
ECHO LV EDV INDEX A4C: 31 ML/M2
ECHO LV EDV NDEX A2C: 46 ML/M2
ECHO LV EF PHYSICIAN: 60 %
ECHO LV EJECTION FRACTION A2C: 66 %
ECHO LV EJECTION FRACTION A4C: 62 %
ECHO LV EJECTION FRACTION BIPLANE: 65 % (ref 55–100)
ECHO LV ESV A2C: 25 ML
ECHO LV ESV A4C: 19 ML
ECHO LV ESV INDEX A2C: 15 ML/M2
ECHO LV ESV INDEX A4C: 12 ML/M2
ECHO LV FRACTIONAL SHORTENING: 33 % (ref 28–44)
ECHO LV INTERNAL DIMENSION DIASTOLE INDEX: 2.82 CM/M2
ECHO LV INTERNAL DIMENSION DIASTOLIC: 4.6 CM (ref 3.9–5.3)
ECHO LV INTERNAL DIMENSION SYSTOLIC INDEX: 1.9 CM/M2
ECHO LV INTERNAL DIMENSION SYSTOLIC: 3.1 CM
ECHO LV IVSD: 0.8 CM (ref 0.6–0.9)
ECHO LV MASS 2D: 117.9 G (ref 67–162)
ECHO LV MASS INDEX 2D: 72.3 G/M2 (ref 43–95)
ECHO LV POSTERIOR WALL DIASTOLIC: 0.8 CM (ref 0.6–0.9)
ECHO LV RELATIVE WALL THICKNESS RATIO: 0.35
ECHO MV A VELOCITY: 0.59 M/S
ECHO MV E DECELERATION TIME (DT): 198 MS
ECHO MV E VELOCITY: 0.85 M/S
ECHO MV E/A RATIO: 1.44
ECHO MV E/E' LATERAL: 4.89
ECHO PV MAX VELOCITY: 0.7 M/S
ECHO PV PEAK GRADIENT: 2 MMHG
ECHO RIGHT VENTRICULAR SYSTOLIC PRESSURE (RVSP): 16 MMHG
ECHO TV REGURGITANT MAX VELOCITY: 1.8 M/S
ECHO TV REGURGITANT PEAK GRADIENT: 13 MMHG

## 2025-03-11 PROCEDURE — 93306 TTE W/DOPPLER COMPLETE: CPT | Performed by: INTERNAL MEDICINE

## 2025-03-11 PROCEDURE — 93306 TTE W/DOPPLER COMPLETE: CPT

## 2025-03-16 ENCOUNTER — RESULTS FOLLOW-UP (OUTPATIENT)
Age: 30
End: 2025-03-16

## 2025-03-17 ENCOUNTER — HOSPITAL ENCOUNTER (OUTPATIENT)
Age: 30
Discharge: HOME | End: 2025-03-17
Payer: MEDICAID

## 2025-03-17 DIAGNOSIS — R79.89 HIGH SERUM FERRITIN: ICD-10-CM

## 2025-03-17 LAB
BASOPHILS # BLD: 0.03 K/UL (ref 0–0.2)
BASOPHILS NFR BLD: 0 % (ref 0–2)
EOSINOPHIL # BLD: 0.1 K/UL (ref 0–0.44)
EOSINOPHILS RELATIVE PERCENT: 1 % (ref 1–4)
ERYTHROCYTE [DISTWIDTH] IN BLOOD BY AUTOMATED COUNT: 17.1 % (ref 11.8–14.4)
FERRITIN SERPL-MCNC: 21 NG/ML (ref 15–150)
FOLATE SERPL-MCNC: 35.3 NG/ML (ref 4.8–24.2)
HCT VFR BLD AUTO: 38.5 % (ref 36.3–47.1)
HGB BLD-MCNC: 12.7 G/DL (ref 11.9–15.1)
IMM GRANULOCYTES # BLD AUTO: <0.03 K/UL (ref 0–0.3)
IMM GRANULOCYTES NFR BLD: 0 %
IRON SATN MFR SERPL: 44 % (ref 20–55)
IRON SERPL-MCNC: 116 UG/DL (ref 37–145)
LYMPHOCYTES NFR BLD: 1.8 K/UL (ref 1.1–3.7)
LYMPHOCYTES RELATIVE PERCENT: 25 % (ref 24–43)
MCH RBC QN AUTO: 29.1 PG (ref 25.2–33.5)
MCHC RBC AUTO-ENTMCNC: 33 G/DL (ref 28.4–34.8)
MCV RBC AUTO: 88.3 FL (ref 82.6–102.9)
MONOCYTES NFR BLD: 0.42 K/UL (ref 0.1–1.2)
MONOCYTES NFR BLD: 6 % (ref 3–12)
NEUTROPHILS NFR BLD: 68 % (ref 36–65)
NEUTS SEG NFR BLD: 4.88 K/UL (ref 1.5–8.1)
NRBC BLD-RTO: 0 PER 100 WBC
PLATELET # BLD AUTO: 259 K/UL (ref 138–453)
PMV BLD AUTO: 10 FL (ref 8.1–13.5)
RBC # BLD AUTO: 4.36 M/UL (ref 3.95–5.11)
TIBC SERPL-MCNC: 265 UG/DL (ref 250–450)
UNSATURATED IRON BINDING CAPACITY: 149 UG/DL (ref 112–347)
VIT B12 SERPL-MCNC: 817 PG/ML (ref 232–1245)
WBC OTHER # BLD: 7.2 K/UL (ref 3.5–11.3)

## 2025-03-17 PROCEDURE — 82728 ASSAY OF FERRITIN: CPT

## 2025-03-17 PROCEDURE — 82607 VITAMIN B-12: CPT

## 2025-03-17 PROCEDURE — 36415 COLL VENOUS BLD VENIPUNCTURE: CPT

## 2025-03-17 PROCEDURE — 83550 IRON BINDING TEST: CPT

## 2025-03-17 PROCEDURE — 85025 COMPLETE CBC W/AUTO DIFF WBC: CPT

## 2025-03-17 PROCEDURE — 82746 ASSAY OF FOLIC ACID SERUM: CPT

## 2025-03-17 PROCEDURE — 83540 ASSAY OF IRON: CPT

## 2025-03-17 NOTE — TELEPHONE ENCOUNTER
----- Message from Dr. Ping Brennan MD sent at 3/16/2025  9:08 PM EDT -----  Echo is good.  Heart function is normal.  Thank you

## 2025-03-24 ENCOUNTER — OFFICE VISIT (OUTPATIENT)
Dept: ONCOLOGY | Age: 30
End: 2025-03-24
Payer: MEDICAID

## 2025-03-24 VITALS
DIASTOLIC BLOOD PRESSURE: 63 MMHG | HEART RATE: 85 BPM | TEMPERATURE: 97.7 F | RESPIRATION RATE: 18 BRPM | WEIGHT: 127 LBS | BODY MASS INDEX: 22.5 KG/M2 | HEIGHT: 63 IN | SYSTOLIC BLOOD PRESSURE: 102 MMHG

## 2025-03-24 DIAGNOSIS — D50.0 IRON DEFICIENCY ANEMIA DUE TO CHRONIC BLOOD LOSS: Primary | ICD-10-CM

## 2025-03-24 DIAGNOSIS — N80.9 ENDOMETRIOSIS: ICD-10-CM

## 2025-03-24 DIAGNOSIS — N92.0 MENORRHAGIA WITH REGULAR CYCLE: ICD-10-CM

## 2025-03-24 PROCEDURE — 99214 OFFICE O/P EST MOD 30 MIN: CPT | Performed by: INTERNAL MEDICINE

## 2025-03-24 NOTE — PROGRESS NOTES
Patient ID: Delilah Durant, 1995, [id number], 30 y.o.  Referred by :  No ref. provider found   Reason for consultation: Low ferritin/iron deficient anemia      HISTORY OF PRESENT ILLNESS:    Oncologic History:    Delilah Durant is a very pleasant 30 y.o. female.  Who was seen by primary care physician for dizziness, labs done on January 2025, WBC at 4.7 hemoglobin 10.1, MCV 78.8 and iron panel with ferritin at 5 and saturation 53 with borderline low absolute reticulocyte count  Patient did have dizziness and tachycardia and concern of this is related to the anemia  She describes a heavy menstrual cycle due to endometriosis even recently it slowing down    Interval history  On oral iron supplement  Hemoglobin improved to 12.7  Iron panel improved  Energy level improved significantly  During this visit patient's allergy, social, medical, surgical history and medications were reviewed and updated.      Past Medical History:   Diagnosis Date    Abnormal Pap smear of cervix     Anemia     Depression 09/15/2021    Endometriosis     Overactive bladder 4/13/2023       Past Surgical History:   Procedure Laterality Date    APPENDECTOMY      COLPOSCOPY      LAPAROSCOPY      x2    TONSILLECTOMY         No Known Allergies    Current Outpatient Medications   Medication Sig Dispense Refill    fludrocortisone (FLORINEF) 0.1 MG tablet Take 1 tablet by mouth daily 90 tablet 3    ferrous sulfate ER (SLOW FE) 45 MG TBCR extended release tablet Take 1 tablet by mouth Daily      Ascorbic Acid (VITAMIN C) 250 MG tablet Take 2 tablets by mouth daily      b complex vitamins capsule Take 1 capsule by mouth daily      Probiotic Product (PROBIOTIC ADVANCED PO) Take by mouth      Magnesium Gluconate (MAGNESIUM 27 PO) Take 250 mg by mouth daily      mirabegron (MYRBETRIQ) 50 MG TB24 Take 50 mg by mouth daily 90 tablet 3    polyethylene glycol (GLYCOLAX) 17 GM/SCOOP powder Take 17 g by mouth daily 1850 g 3    Omega-3 Fatty Acids (FP FISH

## 2025-03-26 ENCOUNTER — OFFICE VISIT (OUTPATIENT)
Dept: OBGYN | Age: 30
End: 2025-03-26
Payer: MEDICAID

## 2025-03-26 ENCOUNTER — HOSPITAL ENCOUNTER (OUTPATIENT)
Age: 30
Setting detail: SPECIMEN
Discharge: HOME | End: 2025-03-26
Payer: MEDICAID

## 2025-03-26 VITALS
WEIGHT: 131 LBS | HEIGHT: 63 IN | BODY MASS INDEX: 23.21 KG/M2 | SYSTOLIC BLOOD PRESSURE: 116 MMHG | DIASTOLIC BLOOD PRESSURE: 64 MMHG

## 2025-03-26 DIAGNOSIS — R23.2 HOT FLASHES: Primary | ICD-10-CM

## 2025-03-26 DIAGNOSIS — R23.2 HOT FLASHES: ICD-10-CM

## 2025-03-26 DIAGNOSIS — N92.6 IRREGULAR MENSES: ICD-10-CM

## 2025-03-26 LAB
ESTRADIOL LEVEL: 43.9 PG/ML
FSH SERPL-ACNC: 4.8 MIU/ML
LH SERPL-ACNC: 2.3 MIU/ML (ref 1.7–8.6)
PROGEST SERPL-MCNC: 3.74 NG/ML
TESTOST SERPL-MCNC: 10 NG/DL (ref 8–48)

## 2025-03-26 PROCEDURE — 84144 ASSAY OF PROGESTERONE: CPT

## 2025-03-26 PROCEDURE — 82670 ASSAY OF TOTAL ESTRADIOL: CPT

## 2025-03-26 PROCEDURE — 99213 OFFICE O/P EST LOW 20 MIN: CPT | Performed by: ADVANCED PRACTICE MIDWIFE

## 2025-03-26 PROCEDURE — 36415 COLL VENOUS BLD VENIPUNCTURE: CPT | Performed by: ADVANCED PRACTICE MIDWIFE

## 2025-03-26 PROCEDURE — 83001 ASSAY OF GONADOTROPIN (FSH): CPT

## 2025-03-26 PROCEDURE — 83002 ASSAY OF GONADOTROPIN (LH): CPT

## 2025-03-26 PROCEDURE — 84403 ASSAY OF TOTAL TESTOSTERONE: CPT

## 2025-03-26 NOTE — PROGRESS NOTES
PROBLEM VISIT     Date of service: 3/26/2025    Delilah Durant  Is a 30 y.o. , female    PT's PCP is: Michael Sellers, APRN - CNP     : 1995                                             Subjective:       Patient's last menstrual period was 2025 (exact date).     OB History    Para Term  AB Living   3 2 2 0 1 2   SAB IAB Ectopic Molar Multiple Live Births   1 0 0 0 0 2      # Outcome Date GA Lbr Abraham/2nd Weight Sex Type Anes PTL Lv   3 Term 10/11/22 40w0d / 00:01 3.728 kg (8 lb 3.5 oz) M Vag-Spont None N AIMEE   2 Term 20 40w5d 04:29 / 00:15 3.413 kg (7 lb 8.4 oz) F Vag-Spont None N AIMEE      Complications: Post-dates pregnancy   1 SAB 2016 6w0d    SAB   ND        Social History     Tobacco Use   Smoking Status Never   Smokeless Tobacco Never        Social History     Substance and Sexual Activity   Alcohol Use No       Social History     Substance and Sexual Activity   Sexual Activity Yes    Partners: Male    Birth control/protection: Condom       Allergies: Patient has no known allergies.    Chief Complaint   Patient presents with    Menstrual Problem     Patient has monthly periods sometimes heavy other times light. Patient c/o frequent night sweats.        Last Yearly date:  2024    Last pap date and results: 2024 negative    Last HPV date and results: 2024 negative    PE:  Vital Signs  Blood pressure 116/64, height 1.6 m (5' 3\"), weight 59.4 kg (131 lb), last menstrual period 2025, not currently breastfeeding.  Estimated body mass index is 23.21 kg/m² as calculated from the following:    Height as of this encounter: 1.6 m (5' 3\").    Weight as of this encounter: 59.4 kg (131 lb).    No data recorded      NURSE: Joan HERNANDEZ    HPI: here with c/o irregular periods and hot flashes, does not think she is going to try for another pregnancy; patient is concerned about some of her periods being \"only 4 days and very light\", also thought hot flashes might

## 2025-03-27 ENCOUNTER — RESULTS FOLLOW-UP (OUTPATIENT)
Dept: OBGYN | Age: 30
End: 2025-03-27

## 2025-03-27 NOTE — RESULT ENCOUNTER NOTE
Pt. notified of results and voices understanding. Transferred to [facility] to schedule ultrasound of ovarian cyst.

## 2025-04-10 ENCOUNTER — HOSPITAL ENCOUNTER (OUTPATIENT)
Age: 30
Discharge: HOME | End: 2025-04-10
Payer: MEDICAID

## 2025-04-10 ENCOUNTER — OFFICE VISIT (OUTPATIENT)
Dept: PRIMARY CARE CLINIC | Age: 30
End: 2025-04-10
Payer: MEDICAID

## 2025-04-10 VITALS
DIASTOLIC BLOOD PRESSURE: 74 MMHG | HEART RATE: 96 BPM | SYSTOLIC BLOOD PRESSURE: 122 MMHG | TEMPERATURE: 99.6 F | BODY MASS INDEX: 22.8 KG/M2 | WEIGHT: 128.7 LBS | OXYGEN SATURATION: 99 %

## 2025-04-10 DIAGNOSIS — R61 NIGHT SWEATS: ICD-10-CM

## 2025-04-10 DIAGNOSIS — R61 NIGHT SWEATS: Primary | ICD-10-CM

## 2025-04-10 LAB
ALBUMIN SERPL-MCNC: 4.5 G/DL (ref 3.5–5.2)
ALBUMIN/GLOB SERPL: 1.9 {RATIO} (ref 1–2.5)
ALP SERPL-CCNC: 42 U/L (ref 35–104)
ALT SERPL-CCNC: 18 U/L (ref 10–35)
ANION GAP SERPL CALCULATED.3IONS-SCNC: 10 MMOL/L (ref 9–16)
AST SERPL-CCNC: 18 U/L (ref 10–35)
BASOPHILS # BLD: 0.03 K/UL (ref 0–0.2)
BASOPHILS NFR BLD: 1 % (ref 0–2)
BILIRUB SERPL-MCNC: 0.4 MG/DL (ref 0–1.2)
BILIRUB UR QL STRIP: NEGATIVE
BUN SERPL-MCNC: 13 MG/DL (ref 6–20)
BUN/CREAT SERPL: 22 (ref 9–20)
CALCIUM SERPL-MCNC: 9.7 MG/DL (ref 8.6–10.4)
CHLORIDE SERPL-SCNC: 102 MMOL/L (ref 98–107)
CLARITY UR: CLEAR
CO2 SERPL-SCNC: 28 MMOL/L (ref 20–31)
COLOR UR: YELLOW
CREAT SERPL-MCNC: 0.6 MG/DL (ref 0.5–0.9)
CRP SERPL HS-MCNC: <3 MG/L (ref 0–5)
EOSINOPHIL # BLD: 0.07 K/UL (ref 0–0.44)
EOSINOPHILS RELATIVE PERCENT: 1 % (ref 1–4)
EPI CELLS #/AREA URNS HPF: NORMAL /HPF (ref 0–25)
ERYTHROCYTE [DISTWIDTH] IN BLOOD BY AUTOMATED COUNT: 14.8 % (ref 11.8–14.4)
GFR, ESTIMATED: >90 ML/MIN/1.73M2
GLUCOSE SERPL-MCNC: 86 MG/DL (ref 74–99)
GLUCOSE UR STRIP-MCNC: NEGATIVE MG/DL
HCT VFR BLD AUTO: 35.8 % (ref 36.3–47.1)
HGB BLD-MCNC: 12 G/DL (ref 11.9–15.1)
HGB UR QL STRIP.AUTO: NEGATIVE
IMM GRANULOCYTES # BLD AUTO: <0.03 K/UL (ref 0–0.3)
IMM GRANULOCYTES NFR BLD: 0 %
KETONES UR STRIP-MCNC: NEGATIVE MG/DL
LEUKOCYTE ESTERASE UR QL STRIP: NEGATIVE
LYMPHOCYTES NFR BLD: 1.87 K/UL (ref 1.1–3.7)
LYMPHOCYTES RELATIVE PERCENT: 37 % (ref 24–43)
MCH RBC QN AUTO: 30.5 PG (ref 25.2–33.5)
MCHC RBC AUTO-ENTMCNC: 33.5 G/DL (ref 28.4–34.8)
MCV RBC AUTO: 91.1 FL (ref 82.6–102.9)
MONOCYTES NFR BLD: 0.8 K/UL (ref 0.1–1.2)
MONOCYTES NFR BLD: 16 % (ref 3–12)
NEUTROPHILS NFR BLD: 45 % (ref 36–65)
NEUTS SEG NFR BLD: 2.31 K/UL (ref 1.5–8.1)
NITRITE UR QL STRIP: NEGATIVE
NRBC BLD-RTO: 0 PER 100 WBC
PH UR STRIP: 6.5 [PH] (ref 5–9)
PLATELET # BLD AUTO: 294 K/UL (ref 138–453)
PMV BLD AUTO: 10.2 FL (ref 8.1–13.5)
POTASSIUM SERPL-SCNC: 4 MMOL/L (ref 3.7–5.3)
PROT SERPL-MCNC: 6.8 G/DL (ref 6.6–8.7)
PROT UR STRIP-MCNC: NEGATIVE MG/DL
RBC # BLD AUTO: 3.93 M/UL (ref 3.95–5.11)
RBC #/AREA URNS HPF: NORMAL /HPF (ref 0–2)
SODIUM SERPL-SCNC: 140 MMOL/L (ref 136–145)
SP GR UR STRIP: 1.01 (ref 1.01–1.02)
TSH SERPL DL<=0.05 MIU/L-ACNC: 1.13 UIU/ML (ref 0.27–4.2)
UROBILINOGEN UR STRIP-ACNC: NORMAL EU/DL (ref 0–1)
WBC #/AREA URNS HPF: NORMAL /HPF (ref 0–5)
WBC OTHER # BLD: 5.1 K/UL (ref 3.5–11.3)

## 2025-04-10 PROCEDURE — 80053 COMPREHEN METABOLIC PANEL: CPT

## 2025-04-10 PROCEDURE — 81001 URINALYSIS AUTO W/SCOPE: CPT

## 2025-04-10 PROCEDURE — 84443 ASSAY THYROID STIM HORMONE: CPT

## 2025-04-10 PROCEDURE — 99214 OFFICE O/P EST MOD 30 MIN: CPT | Performed by: NURSE PRACTITIONER

## 2025-04-10 PROCEDURE — 86480 TB TEST CELL IMMUN MEASURE: CPT

## 2025-04-10 PROCEDURE — 86140 C-REACTIVE PROTEIN: CPT

## 2025-04-10 PROCEDURE — 36415 COLL VENOUS BLD VENIPUNCTURE: CPT

## 2025-04-10 PROCEDURE — 87389 HIV-1 AG W/HIV-1&-2 AB AG IA: CPT

## 2025-04-10 PROCEDURE — 84681 ASSAY OF C-PEPTIDE: CPT

## 2025-04-10 PROCEDURE — 85025 COMPLETE CBC W/AUTO DIFF WBC: CPT

## 2025-04-10 SDOH — ECONOMIC STABILITY: FOOD INSECURITY: WITHIN THE PAST 12 MONTHS, THE FOOD YOU BOUGHT JUST DIDN'T LAST AND YOU DIDN'T HAVE MONEY TO GET MORE.: NEVER TRUE

## 2025-04-10 SDOH — ECONOMIC STABILITY: FOOD INSECURITY: WITHIN THE PAST 12 MONTHS, YOU WORRIED THAT YOUR FOOD WOULD RUN OUT BEFORE YOU GOT MONEY TO BUY MORE.: NEVER TRUE

## 2025-04-10 ASSESSMENT — PATIENT HEALTH QUESTIONNAIRE - PHQ9
1. LITTLE INTEREST OR PLEASURE IN DOING THINGS: NOT AT ALL
6. FEELING BAD ABOUT YOURSELF - OR THAT YOU ARE A FAILURE OR HAVE LET YOURSELF OR YOUR FAMILY DOWN: NOT AT ALL
10. IF YOU CHECKED OFF ANY PROBLEMS, HOW DIFFICULT HAVE THESE PROBLEMS MADE IT FOR YOU TO DO YOUR WORK, TAKE CARE OF THINGS AT HOME, OR GET ALONG WITH OTHER PEOPLE: NOT DIFFICULT AT ALL
9. THOUGHTS THAT YOU WOULD BE BETTER OFF DEAD, OR OF HURTING YOURSELF: NOT AT ALL
4. FEELING TIRED OR HAVING LITTLE ENERGY: NOT AT ALL
7. TROUBLE CONCENTRATING ON THINGS, SUCH AS READING THE NEWSPAPER OR WATCHING TELEVISION: NOT AT ALL
SUM OF ALL RESPONSES TO PHQ QUESTIONS 1-9: 0
SUM OF ALL RESPONSES TO PHQ QUESTIONS 1-9: 0
5. POOR APPETITE OR OVEREATING: NOT AT ALL
SUM OF ALL RESPONSES TO PHQ QUESTIONS 1-9: 0
3. TROUBLE FALLING OR STAYING ASLEEP: NOT AT ALL
8. MOVING OR SPEAKING SO SLOWLY THAT OTHER PEOPLE COULD HAVE NOTICED. OR THE OPPOSITE, BEING SO FIGETY OR RESTLESS THAT YOU HAVE BEEN MOVING AROUND A LOT MORE THAN USUAL: NOT AT ALL
2. FEELING DOWN, DEPRESSED OR HOPELESS: NOT AT ALL
SUM OF ALL RESPONSES TO PHQ QUESTIONS 1-9: 0

## 2025-04-10 ASSESSMENT — ENCOUNTER SYMPTOMS
WHEEZING: 0
VISUAL CHANGE: 0
DIARRHEA: 0
ABDOMINAL PAIN: 0
SHORTNESS OF BREATH: 0
CHANGE IN BOWEL HABIT: 0
VOMITING: 0
SWOLLEN GLANDS: 0
NAUSEA: 0
SORE THROAT: 0
CONSTIPATION: 0
COUGH: 0
RHINORRHEA: 0

## 2025-04-10 NOTE — PATIENT INSTRUCTIONS
SURVEY:     You may be receiving a survey from [facility] [employer] regarding your visit today.     Please complete the survey to enable us to provide the highest quality of care to you and your family.     If you cannot score us a very good on any question, please call the office to discuss how we could have made your experience a very good one.     Thank you,    Michael Sellers, APRN-CNP  Ирина Villalobos, APRN-CNP  Caity, LPN  Najma, CMA  Kane, CMA  Sowmya, CMA  Erika, PCA  Babs, CMA  Jennifer, PM

## 2025-04-10 NOTE — PROGRESS NOTES
Review of Systems:     Positive and Negative as described in HPI    Review of Systems   Constitutional:  Positive for diaphoresis. Negative for chills, fatigue and fever.   HENT:  Negative for congestion, rhinorrhea and sore throat.    Eyes:  Negative for visual disturbance.   Respiratory:  Negative for cough, shortness of breath and wheezing.    Cardiovascular:  Negative for chest pain and palpitations.   Gastrointestinal:  Negative for abdominal pain, anorexia, change in bowel habit, constipation, diarrhea, nausea and vomiting.   Genitourinary:  Negative for difficulty urinating and dysuria.   Musculoskeletal:  Negative for arthralgias, gait problem, joint swelling, myalgias, neck pain and neck stiffness.   Skin:  Negative for rash.   Neurological:  Negative for dizziness, vertigo, syncope, weakness, light-headedness, numbness and headaches.       Physical Exam:   Vitals:  /74 (BP Site: Left Upper Arm, Patient Position: Sitting, BP Cuff Size: Medium Adult)   Pulse 96   Temp 99.6 °F (37.6 °C)   Wt 58.4 kg (128 lb 11.2 oz)   LMP 03/02/2025 (Exact Date)   SpO2 99%   BMI 22.80 kg/m²     Physical Exam  Vitals and nursing note reviewed.   Constitutional:       General: She is not in acute distress.     Appearance: She is not ill-appearing.   HENT:      Mouth/Throat:      Mouth: Mucous membranes are moist.      Pharynx: Oropharynx is clear.   Eyes:      General: No scleral icterus.     Conjunctiva/sclera: Conjunctivae normal.   Cardiovascular:      Rate and Rhythm: Normal rate and regular rhythm.      Heart sounds: No murmur heard.  Pulmonary:      Effort: Pulmonary effort is normal.      Breath sounds: Normal breath sounds. No wheezing.   Abdominal:      General: Bowel sounds are normal. There is no distension.      Palpations: Abdomen is soft.      Tenderness: There is no abdominal tenderness.   Musculoskeletal:      Cervical back: Normal range of motion and neck supple.      Right lower leg: No

## 2025-04-11 LAB
C PEPTIDE SERPL-MCNC: 3.3 NG/ML (ref 1.1–4.4)
HIV 1+2 AB+HIV1 P24 AG SERPL QL IA: NONREACTIVE

## 2025-04-13 ENCOUNTER — RESULTS FOLLOW-UP (OUTPATIENT)
Dept: PRIMARY CARE CLINIC | Age: 30
End: 2025-04-13

## 2025-04-13 LAB
QUANTI TB GOLD PLUS: NEGATIVE
QUANTI TB1 MINUS NIL: 0.03 IU/ML
QUANTI TB2 MINUS NIL: 0.02 IU/ML
QUANTIFERON MITOGEN: 9.93 IU/ML
QUANTIFERON NIL: 0.07 IU/ML

## 2025-04-14 NOTE — TELEPHONE ENCOUNTER
LVM to patient with results message. Patient informed to call the office back if she would like referral.

## 2025-04-14 NOTE — TELEPHONE ENCOUNTER
----- Message from GERMANIA Pepe CNP sent at 4/13/2025  5:13 PM EDT -----  Results are normal, please call patient and make them aware. We can have her see infectious diseases if she continues to have the night sweats if she would like.

## 2025-04-17 ENCOUNTER — HOSPITAL ENCOUNTER (OUTPATIENT)
Age: 30
Setting detail: SPECIMEN
Discharge: HOME | End: 2025-04-17
Payer: MEDICAID

## 2025-04-17 ENCOUNTER — OFFICE VISIT (OUTPATIENT)
Dept: OBGYN | Age: 30
End: 2025-04-17
Payer: MEDICAID

## 2025-04-17 VITALS
DIASTOLIC BLOOD PRESSURE: 70 MMHG | BODY MASS INDEX: 22.86 KG/M2 | SYSTOLIC BLOOD PRESSURE: 116 MMHG | HEIGHT: 63 IN | WEIGHT: 129 LBS

## 2025-04-17 DIAGNOSIS — N39.498 OTHER URINARY INCONTINENCE: ICD-10-CM

## 2025-04-17 DIAGNOSIS — N92.0 MENORRHAGIA WITH REGULAR CYCLE: Primary | ICD-10-CM

## 2025-04-17 DIAGNOSIS — N94.89 PELVIC CONGESTION SYNDROME: ICD-10-CM

## 2025-04-17 LAB
BILIRUB UR QL STRIP: NEGATIVE
CLARITY UR: CLEAR
COLOR UR: YELLOW
GLUCOSE UR STRIP-MCNC: NEGATIVE MG/DL
HGB UR QL STRIP.AUTO: NEGATIVE
KETONES UR STRIP-MCNC: NEGATIVE MG/DL
LEUKOCYTE ESTERASE UR QL STRIP: NEGATIVE
NITRITE UR QL STRIP: NEGATIVE
PH UR STRIP: 7 [PH] (ref 5–9)
PROT UR STRIP-MCNC: NEGATIVE MG/DL
SP GR UR STRIP: 1.02 (ref 1.01–1.02)
UROBILINOGEN UR STRIP-ACNC: NORMAL EU/DL (ref 0–1)

## 2025-04-17 PROCEDURE — 87086 URINE CULTURE/COLONY COUNT: CPT

## 2025-04-17 PROCEDURE — 81003 URINALYSIS AUTO W/O SCOPE: CPT

## 2025-04-17 PROCEDURE — 99213 OFFICE O/P EST LOW 20 MIN: CPT | Performed by: ADVANCED PRACTICE MIDWIFE

## 2025-04-17 NOTE — PROGRESS NOTES
PROBLEM VISIT     Date of service: 2025    Delilah Durant  Is a 30 y.o. , female    PT's PCP is: Michael Sellers, APRN - CNP     : 1995                                             Subjective:       Patient's last menstrual period was 2025 (approximate).     OB History    Para Term  AB Living   3 2 2 0 1 2   SAB IAB Ectopic Molar Multiple Live Births   1 0 0 0 0 2      # Outcome Date GA Lbr Abraham/2nd Weight Sex Type Anes PTL Lv   3 Term 10/11/22 40w0d / 00:01 3.728 kg (8 lb 3.5 oz) M Vag-Spont None N AIMEE   2 Term 20 40w5d 04:29 / 00:15 3.413 kg (7 lb 8.4 oz) F Vag-Spont None N AIMEE      Complications: Post-dates pregnancy   1 SAB 2016 6w0d    SAB   ND        Social History     Tobacco Use   Smoking Status Never   Smokeless Tobacco Never        Social History     Substance and Sexual Activity   Alcohol Use No       Social History     Substance and Sexual Activity   Sexual Activity Yes    Partners: Male    Birth control/protection: Condom       Allergies: Patient has no known allergies.    Chief Complaint   Patient presents with    Ovarian Cyst     Follow up in house ultrasound. H/O complex ovarian cyst.        Last Yearly date:  2024    Last pap date and results: 2024 negative    Last HPV date and results: 2024 negative    PE:  Vital Signs  Blood pressure 116/70, height 1.6 m (5' 3\"), weight 58.5 kg (129 lb), last menstrual period 2025, not currently breastfeeding.  Estimated body mass index is 22.85 kg/m² as calculated from the following:    Height as of this encounter: 1.6 m (5' 3\").    Weight as of this encounter: 58.5 kg (129 lb).    No data recorded      NURSE: Joan HERNANDEZ    HPI: here to f/u painful and irregular periods, had a previous complex ovarian cyst; also went to pelvic floor therapy and helped but \"it dropped again\" and is now having leakage of urine, \"just want to be done with all this\"      PT denies fever, chills, nausea and

## 2025-04-18 LAB
MICROORGANISM SPEC CULT: NO GROWTH
SERVICE CMNT-IMP: NORMAL
SPECIMEN DESCRIPTION: NORMAL

## 2025-04-25 ENCOUNTER — OFFICE VISIT (OUTPATIENT)
Dept: OBGYN | Age: 30
End: 2025-04-25
Payer: MEDICAID

## 2025-04-25 VITALS
WEIGHT: 126 LBS | HEIGHT: 63 IN | BODY MASS INDEX: 22.32 KG/M2 | SYSTOLIC BLOOD PRESSURE: 114 MMHG | DIASTOLIC BLOOD PRESSURE: 74 MMHG

## 2025-04-25 DIAGNOSIS — N94.89 PELVIC CONGESTION SYNDROME: ICD-10-CM

## 2025-04-25 DIAGNOSIS — N92.0 MENORRHAGIA WITH REGULAR CYCLE: Primary | ICD-10-CM

## 2025-04-25 DIAGNOSIS — R39.15 URINARY URGENCY: ICD-10-CM

## 2025-04-25 DIAGNOSIS — N81.11 CYSTOCELE, MIDLINE: ICD-10-CM

## 2025-04-25 DIAGNOSIS — N80.03 ADENOMYOSIS: ICD-10-CM

## 2025-04-25 DIAGNOSIS — N80.9 ENDOMETRIOSIS DETERMINED BY LAPAROSCOPY: ICD-10-CM

## 2025-04-25 PROCEDURE — 99214 OFFICE O/P EST MOD 30 MIN: CPT | Performed by: OBSTETRICS & GYNECOLOGY

## 2025-05-01 DIAGNOSIS — N94.89 PELVIC CONGESTION SYNDROME: ICD-10-CM

## 2025-05-01 DIAGNOSIS — N81.11 CYSTOCELE, MIDLINE: ICD-10-CM

## 2025-05-01 DIAGNOSIS — N80.03 ADENOMYOSIS: ICD-10-CM

## 2025-05-01 DIAGNOSIS — N80.9 ENDOMETRIOSIS DETERMINED BY LAPAROSCOPY: ICD-10-CM

## 2025-05-01 DIAGNOSIS — N92.0 MENORRHAGIA WITH REGULAR CYCLE: Primary | ICD-10-CM

## 2025-05-01 NOTE — PROGRESS NOTES
DATE OF VISIT:  4/25/25    PATIENT NAME:  Delilah Durant     YOB: 1995    REASON FOR VISIT:    Chief Complaint   Patient presents with    Menorrhagia     Patient is being seen to discuss her heavy periods.  She states that her periods have been this heavy with clotting for over a year.  She also complains that her bladder feels like it has come down and she was having urinary frequency.          HISTORY OF PRESENT ILLNESS:  HPI    Patient's last menstrual period was 04/21/2025 (approximate).  Vitals:    04/25/25 1124   BP: 114/74   BP Site: Left Upper Arm   Patient Position: Sitting   Weight: 57.2 kg (126 lb)   Height: 1.6 m (5' 3\")     Body mass index is 22.32 kg/m².  No Known Allergies  Current Outpatient Medications   Medication Sig Dispense Refill    ferrous sulfate ER (SLOW FE) 45 MG TBCR extended release tablet Take 1 tablet by mouth Daily      Ascorbic Acid (VITAMIN C) 250 MG tablet Take 2 tablets by mouth daily      b complex vitamins capsule Take 1 capsule by mouth daily      Probiotic Product (PROBIOTIC ADVANCED PO) Take by mouth      Magnesium Gluconate (MAGNESIUM 27 PO) Take 250 mg by mouth daily      mirabegron (MYRBETRIQ) 50 MG TB24 Take 50 mg by mouth daily 90 tablet 3    polyethylene glycol (GLYCOLAX) 17 GM/SCOOP powder Take 17 g by mouth daily 1850 g 3    Omega-3 Fatty Acids (FP FISH OIL PO) Take by mouth daily      VITAMIN D PO Take 25 mcg by mouth daily       No current facility-administered medications for this visit.     Social History     Socioeconomic History    Marital status:    Tobacco Use    Smoking status: Never    Smokeless tobacco: Never   Vaping Use    Vaping status: Never Used   Substance and Sexual Activity    Alcohol use: No    Drug use: No    Sexual activity: Yes     Partners: Male     Birth control/protection: Condom     Social Drivers of Health     Financial Resource Strain: Low Risk  (12/27/2024)    Overall Financial Resource Strain (CARDIA)     
I spoke to patient and reviewed surgery expectations and recovery.   She is penciled in for a RA TLH, poss BSO, Laparoscopic Colpopexy and possible Anterior Repair at [facility] on 7/14/2025.  She is aware that a nurse from [facility] will call her to complete a phone interview.  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  Patient will come in to see Dr. Ascencio prior to surgery and will get labs on admission.  We will also follow up 2 and 6 weeks after surgery.  Call transferred to [facility] to schedule her visits.  Patient verbalized understanding, no further questions/concerns voiced.  
22.32 kg/m²   Physical Exam  Constitutional:       Appearance: Normal appearance.   HENT:      Head: Normocephalic and atraumatic.   Eyes:      Extraocular Movements: Extraocular movements intact.      Pupils: Pupils are equal, round, and reactive to light.   Cardiovascular:      Rate and Rhythm: Normal rate.   Pulmonary:      Effort: Pulmonary effort is normal.   Musculoskeletal:         General: Normal range of motion.   Neurological:      Mental Status: She is alert and oriented to person, place, and time.   Skin:     General: Skin is warm and dry.   Psychiatric:         Mood and Affect: Mood normal.         Behavior: Behavior normal.         ASSESSMENT:      1. Menorrhagia with regular cycle    2. Pelvic congestion syndrome    3. Endometriosis determined by laparoscopy    4. Adenomyosis    5. Cystocele, midline    6. Urinary urgency        PLAN:    RA TLH/poss BSO/colpopexy/poss anterior repair    No orders of the defined types were placed in this encounter.    Return for  to coordinate.       Electronically signed by Elodia Yip DO on 04/30/25

## 2025-06-23 NOTE — PROGRESS NOTES
Patient instructed on the pre-operative, intra-operative, and post-operative process. Patient instructed on NPO status. Medication instructions and pre operative instruction sheet reviewed with the patient. CHG skin prep instructions reviewed with patient. Patient will hold probiotic,magnesium, b complex, omega 3, vit D & C 7 days prior to procedure. Patient may take Mybetriq with a small sip of water the morning of procedure, prior to arrival to the hospital. Patient verbalized understanding.

## 2025-06-24 DIAGNOSIS — K59.00 CONSTIPATION, UNSPECIFIED CONSTIPATION TYPE: ICD-10-CM

## 2025-06-24 RX ORDER — POLYETHYLENE GLYCOL 3350 17 G/17G
POWDER, FOR SOLUTION ORAL
Qty: 510 G | Refills: 5 | Status: SHIPPED | OUTPATIENT
Start: 2025-06-24

## 2025-06-24 NOTE — TELEPHONE ENCOUNTER
Last appt   10/31/2024   Next appt   10/30/2025     Medication is active on current medication list.

## 2025-07-01 ENCOUNTER — OFFICE VISIT (OUTPATIENT)
Dept: OBGYN | Age: 30
End: 2025-07-01
Payer: MEDICAID

## 2025-07-01 VITALS
HEIGHT: 63 IN | DIASTOLIC BLOOD PRESSURE: 68 MMHG | BODY MASS INDEX: 23.57 KG/M2 | SYSTOLIC BLOOD PRESSURE: 114 MMHG | WEIGHT: 133 LBS

## 2025-07-01 DIAGNOSIS — N92.0 MENORRHAGIA WITH REGULAR CYCLE: Primary | ICD-10-CM

## 2025-07-01 DIAGNOSIS — N94.89 PELVIC CONGESTION SYNDROME: ICD-10-CM

## 2025-07-01 DIAGNOSIS — N81.11 CYSTOCELE, MIDLINE: ICD-10-CM

## 2025-07-01 DIAGNOSIS — N80.03 ADENOMYOSIS: ICD-10-CM

## 2025-07-01 DIAGNOSIS — N80.9 ENDOMETRIOSIS DETERMINED BY LAPAROSCOPY: ICD-10-CM

## 2025-07-01 PROCEDURE — 99213 OFFICE O/P EST LOW 20 MIN: CPT | Performed by: OBSTETRICS & GYNECOLOGY

## 2025-07-01 NOTE — PROGRESS NOTES
DATE OF VISIT:  7/1/25    PATIENT NAME:  Delilah Durant     YOB: 1995    REASON FOR VISIT:    Chief Complaint   Patient presents with    Menorrhagia     Patient is being seen to discuss ongoing issues with menorrhagia and treatment options.         HISTORY OF PRESENT ILLNESS:  Pt continues to have heavy menses; pt with hx of endometriosis, bleeding has worsened - states she is anemic, but last hgb is normal; has decided that she doesn't want any more children; also c/o of bladder prolapse - had done PT but states it is still a problem; disc'd definitive surgery RA TLH/poss BSO/lap colpopexy/poss anterior repair; r/b/a reviewed; restrictions and recovery disc'd        Patient's last menstrual period was 06/12/2025 (exact date).  Vitals:    07/01/25 1452   BP: 114/68   BP Site: Left Upper Arm   Patient Position: Sitting   Weight: 60.3 kg (133 lb)   Height: 1.6 m (5' 3\")     Body mass index is 23.56 kg/m².  No Known Allergies  Current Outpatient Medications   Medication Sig Dispense Refill    polyethylene glycol (GLYCOLAX) 17 GM/SCOOP powder DISSOLVE 17 GRAMS IN 8 OUNCES OF LIQUID AND DRINK ONCE A  g 5    ferrous sulfate ER (SLOW FE) 45 MG TBCR extended release tablet Take 1 tablet by mouth Daily      Ascorbic Acid (VITAMIN C) 250 MG tablet Take 2 tablets by mouth daily      b complex vitamins capsule Take 1 capsule by mouth daily      Probiotic Product (PROBIOTIC ADVANCED PO) Take by mouth      Magnesium Gluconate (MAGNESIUM 27 PO) Take 250 mg by mouth daily      mirabegron (MYRBETRIQ) 50 MG TB24 Take 50 mg by mouth daily 90 tablet 3    Omega-3 Fatty Acids (FP FISH OIL PO) Take by mouth daily      VITAMIN D PO Take 25 mcg by mouth daily       No current facility-administered medications for this visit.     Social History     Socioeconomic History    Marital status:    Tobacco Use    Smoking status: Never    Smokeless tobacco: Never   Vaping Use    Vaping status: Never Used   Substance

## 2025-07-01 NOTE — H&P (VIEW-ONLY)
Rhythm: Normal rate.   Pulmonary:      Effort: Pulmonary effort is normal.   Musculoskeletal:         General: Normal range of motion.   Neurological:      Mental Status: She is alert and oriented to person, place, and time.   Skin:     General: Skin is warm and dry.   Psychiatric:         Mood and Affect: Mood normal.         Behavior: Behavior normal.         ASSESSMENT:      1. Menorrhagia with regular cycle    2. Pelvic congestion syndrome    3. Endometriosis determined by laparoscopy    4. Adenomyosis    5. Cystocele, midline        PLAN:  No orders of the defined types were placed in this encounter.    Return for RA TLH/poss BSO/lap colpopexy/poss anterior colporrhaphy.       Electronically signed by Elodia Yip DO on 07/01/25

## 2025-07-11 ENCOUNTER — ANESTHESIA EVENT (OUTPATIENT)
Dept: OPERATING ROOM | Age: 30
End: 2025-07-11
Payer: MEDICAID

## 2025-07-14 ENCOUNTER — ANESTHESIA (OUTPATIENT)
Dept: OPERATING ROOM | Age: 30
End: 2025-07-14
Payer: MEDICAID

## 2025-07-14 ENCOUNTER — HOSPITAL ENCOUNTER (OUTPATIENT)
Age: 30
Setting detail: OUTPATIENT SURGERY
Discharge: HOME | End: 2025-07-14
Attending: OBSTETRICS & GYNECOLOGY | Admitting: OBSTETRICS & GYNECOLOGY
Payer: MEDICAID

## 2025-07-14 VITALS
HEART RATE: 70 BPM | SYSTOLIC BLOOD PRESSURE: 95 MMHG | OXYGEN SATURATION: 98 % | HEIGHT: 63 IN | RESPIRATION RATE: 18 BRPM | TEMPERATURE: 97.3 F | DIASTOLIC BLOOD PRESSURE: 56 MMHG | BODY MASS INDEX: 23.6 KG/M2 | WEIGHT: 133.2 LBS

## 2025-07-14 DIAGNOSIS — N80.03 ADENOMYOSIS: ICD-10-CM

## 2025-07-14 DIAGNOSIS — N94.89 PELVIC CONGESTION SYNDROME: ICD-10-CM

## 2025-07-14 DIAGNOSIS — N80.9 ENDOMETRIOSIS: ICD-10-CM

## 2025-07-14 DIAGNOSIS — R79.89 HIGH SERUM FERRITIN: ICD-10-CM

## 2025-07-14 DIAGNOSIS — G89.18 POSTOPERATIVE PAIN: Primary | ICD-10-CM

## 2025-07-14 DIAGNOSIS — N81.11 CYSTOCELE, MIDLINE: ICD-10-CM

## 2025-07-14 DIAGNOSIS — N80.9 ENDOMETRIOSIS DETERMINED BY LAPAROSCOPY: ICD-10-CM

## 2025-07-14 DIAGNOSIS — N92.0 MENORRHAGIA WITH REGULAR CYCLE: ICD-10-CM

## 2025-07-14 DIAGNOSIS — D50.0 IRON DEFICIENCY ANEMIA DUE TO CHRONIC BLOOD LOSS: Primary | ICD-10-CM

## 2025-07-14 PROBLEM — N81.4 UTEROVAGINAL PROLAPSE: Status: ACTIVE | Noted: 2025-07-14

## 2025-07-14 LAB — HCG UR QL: NEGATIVE

## 2025-07-14 PROCEDURE — 88307 TISSUE EXAM BY PATHOLOGIST: CPT

## 2025-07-14 PROCEDURE — 7100000000 HC PACU RECOVERY - FIRST 15 MIN: Performed by: OBSTETRICS & GYNECOLOGY

## 2025-07-14 PROCEDURE — 2580000003 HC RX 258

## 2025-07-14 PROCEDURE — 6370000000 HC RX 637 (ALT 250 FOR IP)

## 2025-07-14 PROCEDURE — 7100000010 HC PHASE II RECOVERY - FIRST 15 MIN: Performed by: OBSTETRICS & GYNECOLOGY

## 2025-07-14 PROCEDURE — 3600000019 HC SURGERY ROBOT ADDTL 15MIN: Performed by: OBSTETRICS & GYNECOLOGY

## 2025-07-14 PROCEDURE — 88305 TISSUE EXAM BY PATHOLOGIST: CPT

## 2025-07-14 PROCEDURE — S2900 ROBOTIC SURGICAL SYSTEM: HCPCS | Performed by: OBSTETRICS & GYNECOLOGY

## 2025-07-14 PROCEDURE — 7100000011 HC PHASE II RECOVERY - ADDTL 15 MIN: Performed by: OBSTETRICS & GYNECOLOGY

## 2025-07-14 PROCEDURE — 6360000002 HC RX W HCPCS: Performed by: NURSE ANESTHETIST, CERTIFIED REGISTERED

## 2025-07-14 PROCEDURE — 2500000003 HC RX 250 WO HCPCS

## 2025-07-14 PROCEDURE — 64488 TAP BLOCK BI INJECTION: CPT | Performed by: NURSE ANESTHETIST, CERTIFIED REGISTERED

## 2025-07-14 PROCEDURE — 3700000000 HC ANESTHESIA ATTENDED CARE: Performed by: OBSTETRICS & GYNECOLOGY

## 2025-07-14 PROCEDURE — 81025 URINE PREGNANCY TEST: CPT

## 2025-07-14 PROCEDURE — 6360000002 HC RX W HCPCS

## 2025-07-14 PROCEDURE — 2500000003 HC RX 250 WO HCPCS: Performed by: NURSE ANESTHETIST, CERTIFIED REGISTERED

## 2025-07-14 PROCEDURE — 2709999900 HC NON-CHARGEABLE SUPPLY: Performed by: OBSTETRICS & GYNECOLOGY

## 2025-07-14 PROCEDURE — 6370000000 HC RX 637 (ALT 250 FOR IP): Performed by: NURSE ANESTHETIST, CERTIFIED REGISTERED

## 2025-07-14 PROCEDURE — 7100000001 HC PACU RECOVERY - ADDTL 15 MIN: Performed by: OBSTETRICS & GYNECOLOGY

## 2025-07-14 PROCEDURE — 3600000009 HC SURGERY ROBOT BASE: Performed by: OBSTETRICS & GYNECOLOGY

## 2025-07-14 PROCEDURE — 3700000001 HC ADD 15 MINUTES (ANESTHESIA): Performed by: OBSTETRICS & GYNECOLOGY

## 2025-07-14 RX ORDER — ROPIVACAINE HYDROCHLORIDE 5 MG/ML
INJECTION, SOLUTION EPIDURAL; INFILTRATION; PERINEURAL
Status: DISCONTINUED | OUTPATIENT
Start: 2025-07-14 | End: 2025-07-14 | Stop reason: SDUPTHER

## 2025-07-14 RX ORDER — SODIUM CHLORIDE 0.9 % (FLUSH) 0.9 %
5-40 SYRINGE (ML) INJECTION PRN
Status: DISCONTINUED | OUTPATIENT
Start: 2025-07-14 | End: 2025-07-14 | Stop reason: HOSPADM

## 2025-07-14 RX ORDER — DEXAMETHASONE SODIUM PHOSPHATE 10 MG/ML
INJECTION, SOLUTION INTRAMUSCULAR; INTRAVENOUS
Status: DISCONTINUED | OUTPATIENT
Start: 2025-07-14 | End: 2025-07-14 | Stop reason: SDUPTHER

## 2025-07-14 RX ORDER — FENTANYL CITRATE 50 UG/ML
INJECTION, SOLUTION INTRAMUSCULAR; INTRAVENOUS
Status: DISCONTINUED | OUTPATIENT
Start: 2025-07-14 | End: 2025-07-14 | Stop reason: SDUPTHER

## 2025-07-14 RX ORDER — SODIUM CHLORIDE 9 MG/ML
INJECTION, SOLUTION INTRAVENOUS PRN
Status: DISCONTINUED | OUTPATIENT
Start: 2025-07-14 | End: 2025-07-14 | Stop reason: HOSPADM

## 2025-07-14 RX ORDER — METRONIDAZOLE 500 MG/100ML
500 INJECTION, SOLUTION INTRAVENOUS ONCE
Status: COMPLETED | OUTPATIENT
Start: 2025-07-14 | End: 2025-07-14

## 2025-07-14 RX ORDER — SODIUM CHLORIDE, SODIUM LACTATE, POTASSIUM CHLORIDE, CALCIUM CHLORIDE 600; 310; 30; 20 MG/100ML; MG/100ML; MG/100ML; MG/100ML
INJECTION, SOLUTION INTRAVENOUS CONTINUOUS
Status: DISCONTINUED | OUTPATIENT
Start: 2025-07-14 | End: 2025-07-14 | Stop reason: HOSPADM

## 2025-07-14 RX ORDER — FENTANYL CITRATE 50 UG/ML
50 INJECTION, SOLUTION INTRAMUSCULAR; INTRAVENOUS EVERY 5 MIN PRN
Status: DISCONTINUED | OUTPATIENT
Start: 2025-07-14 | End: 2025-07-14 | Stop reason: HOSPADM

## 2025-07-14 RX ORDER — GLYCOPYRROLATE 0.2 MG/ML
INJECTION INTRAMUSCULAR; INTRAVENOUS
Status: DISCONTINUED | OUTPATIENT
Start: 2025-07-14 | End: 2025-07-14 | Stop reason: SDUPTHER

## 2025-07-14 RX ORDER — DIMENHYDRINATE 50 MG
50 TABLET ORAL ONCE
Status: COMPLETED | OUTPATIENT
Start: 2025-07-14 | End: 2025-07-14

## 2025-07-14 RX ORDER — SODIUM CHLORIDE 0.9 % (FLUSH) 0.9 %
5-40 SYRINGE (ML) INJECTION EVERY 12 HOURS SCHEDULED
Status: DISCONTINUED | OUTPATIENT
Start: 2025-07-14 | End: 2025-07-14 | Stop reason: HOSPADM

## 2025-07-14 RX ORDER — MIDAZOLAM HYDROCHLORIDE 1 MG/ML
INJECTION, SOLUTION INTRAMUSCULAR; INTRAVENOUS
Status: DISCONTINUED | OUTPATIENT
Start: 2025-07-14 | End: 2025-07-14 | Stop reason: SDUPTHER

## 2025-07-14 RX ORDER — HYDROCODONE BITARTRATE AND ACETAMINOPHEN 5; 325 MG/1; MG/1
1 TABLET ORAL EVERY 6 HOURS PRN
Status: DISCONTINUED | OUTPATIENT
Start: 2025-07-14 | End: 2025-07-14 | Stop reason: HOSPADM

## 2025-07-14 RX ORDER — NEOSTIGMINE METHYLSULFATE 1 MG/ML
INJECTION INTRAVENOUS
Status: DISCONTINUED | OUTPATIENT
Start: 2025-07-14 | End: 2025-07-14 | Stop reason: SDUPTHER

## 2025-07-14 RX ORDER — LIDOCAINE HYDROCHLORIDE 20 MG/ML
INJECTION, SOLUTION EPIDURAL; INFILTRATION; INTRACAUDAL; PERINEURAL
Status: DISCONTINUED | OUTPATIENT
Start: 2025-07-14 | End: 2025-07-14 | Stop reason: SDUPTHER

## 2025-07-14 RX ORDER — GABAPENTIN 300 MG/1
300 CAPSULE ORAL ONCE
Status: COMPLETED | OUTPATIENT
Start: 2025-07-14 | End: 2025-07-14

## 2025-07-14 RX ORDER — ROCURONIUM BROMIDE 10 MG/ML
INJECTION, SOLUTION INTRAVENOUS
Status: DISCONTINUED | OUTPATIENT
Start: 2025-07-14 | End: 2025-07-14 | Stop reason: SDUPTHER

## 2025-07-14 RX ORDER — METOCLOPRAMIDE HYDROCHLORIDE 5 MG/ML
10 INJECTION INTRAMUSCULAR; INTRAVENOUS
Status: COMPLETED | OUTPATIENT
Start: 2025-07-14 | End: 2025-07-14

## 2025-07-14 RX ORDER — SODIUM CHLORIDE 9 MG/ML
INJECTION, SOLUTION INTRAMUSCULAR; INTRAVENOUS; SUBCUTANEOUS
Status: DISCONTINUED | OUTPATIENT
Start: 2025-07-14 | End: 2025-07-14 | Stop reason: SDUPTHER

## 2025-07-14 RX ORDER — DEXAMETHASONE SODIUM PHOSPHATE 4 MG/ML
INJECTION, SOLUTION INTRA-ARTICULAR; INTRALESIONAL; INTRAMUSCULAR; INTRAVENOUS; SOFT TISSUE
Status: DISCONTINUED | OUTPATIENT
Start: 2025-07-14 | End: 2025-07-14 | Stop reason: SDUPTHER

## 2025-07-14 RX ORDER — KETOROLAC TROMETHAMINE 30 MG/ML
INJECTION, SOLUTION INTRAMUSCULAR; INTRAVENOUS
Status: DISCONTINUED | OUTPATIENT
Start: 2025-07-14 | End: 2025-07-14 | Stop reason: SDUPTHER

## 2025-07-14 RX ORDER — ACETAMINOPHEN 325 MG/1
650 TABLET ORAL ONCE
Status: COMPLETED | OUTPATIENT
Start: 2025-07-14 | End: 2025-07-14

## 2025-07-14 RX ORDER — PROPOFOL 10 MG/ML
INJECTION, EMULSION INTRAVENOUS
Status: DISCONTINUED | OUTPATIENT
Start: 2025-07-14 | End: 2025-07-14 | Stop reason: SDUPTHER

## 2025-07-14 RX ORDER — KETOROLAC TROMETHAMINE 10 MG/1
10 TABLET, FILM COATED ORAL EVERY 6 HOURS PRN
Qty: 20 TABLET | Refills: 0 | Status: SHIPPED | OUTPATIENT
Start: 2025-07-14 | End: 2026-07-14

## 2025-07-14 RX ORDER — HYDROCODONE BITARTRATE AND ACETAMINOPHEN 5; 325 MG/1; MG/1
1 TABLET ORAL EVERY 6 HOURS PRN
Qty: 10 TABLET | Refills: 0 | Status: SHIPPED | OUTPATIENT
Start: 2025-07-14 | End: 2025-07-21

## 2025-07-14 RX ORDER — ONDANSETRON 2 MG/ML
INJECTION INTRAMUSCULAR; INTRAVENOUS
Status: DISCONTINUED | OUTPATIENT
Start: 2025-07-14 | End: 2025-07-14 | Stop reason: SDUPTHER

## 2025-07-14 RX ORDER — ENOXAPARIN SODIUM 100 MG/ML
40 INJECTION SUBCUTANEOUS ONCE
Status: COMPLETED | OUTPATIENT
Start: 2025-07-14 | End: 2025-07-14

## 2025-07-14 RX ADMIN — DEXAMETHASONE SODIUM PHOSPHATE 10 MG: 10 INJECTION, SOLUTION INTRAMUSCULAR; INTRAVENOUS at 07:15

## 2025-07-14 RX ADMIN — METRONIDAZOLE 500 MG: 500 INJECTION, SOLUTION INTRAVENOUS at 06:42

## 2025-07-14 RX ADMIN — LIDOCAINE HYDROCHLORIDE 80 MG: 20 INJECTION, SOLUTION EPIDURAL; INFILTRATION; INTRACAUDAL; PERINEURAL at 07:45

## 2025-07-14 RX ADMIN — ROCURONIUM BROMIDE 10 MG: 10 INJECTION, SOLUTION INTRAVENOUS at 08:14

## 2025-07-14 RX ADMIN — NEOSTIGMINE METHYLSULFATE 3 MG: 1 INJECTION INTRAVENOUS at 09:07

## 2025-07-14 RX ADMIN — Medication 0.1 MG: at 09:22

## 2025-07-14 RX ADMIN — HYDROCODONE BITARTRATE AND ACETAMINOPHEN 1 TABLET: 5; 325 TABLET ORAL at 11:16

## 2025-07-14 RX ADMIN — SODIUM CHLORIDE, POTASSIUM CHLORIDE, SODIUM LACTATE AND CALCIUM CHLORIDE: 600; 310; 30; 20 INJECTION, SOLUTION INTRAVENOUS at 06:42

## 2025-07-14 RX ADMIN — SODIUM CHLORIDE 30 ML: 9 INJECTION, SOLUTION INTRAMUSCULAR; INTRAVENOUS; SUBCUTANEOUS at 07:15

## 2025-07-14 RX ADMIN — MIDAZOLAM 2 MG: 1 INJECTION INTRAMUSCULAR; INTRAVENOUS at 07:07

## 2025-07-14 RX ADMIN — SODIUM CHLORIDE, POTASSIUM CHLORIDE, SODIUM LACTATE AND CALCIUM CHLORIDE: 600; 310; 30; 20 INJECTION, SOLUTION INTRAVENOUS at 08:35

## 2025-07-14 RX ADMIN — Medication 0.1 MG: at 07:52

## 2025-07-14 RX ADMIN — DEXAMETHASONE SODIUM PHOSPHATE 4 MG: 4 INJECTION, SOLUTION INTRAMUSCULAR; INTRAVENOUS at 08:39

## 2025-07-14 RX ADMIN — ACETAMINOPHEN 650 MG: 325 TABLET ORAL at 06:30

## 2025-07-14 RX ADMIN — ENOXAPARIN SODIUM 40 MG: 100 INJECTION SUBCUTANEOUS at 06:32

## 2025-07-14 RX ADMIN — GABAPENTIN 300 MG: 300 CAPSULE ORAL at 06:30

## 2025-07-14 RX ADMIN — KETOROLAC TROMETHAMINE 30 MG: 30 INJECTION, SOLUTION INTRAMUSCULAR at 09:05

## 2025-07-14 RX ADMIN — ONDANSETRON 4 MG: 2 INJECTION, SOLUTION INTRAMUSCULAR; INTRAVENOUS at 08:39

## 2025-07-14 RX ADMIN — PROPOFOL 150 MG: 10 INJECTION, EMULSION INTRAVENOUS at 07:45

## 2025-07-14 RX ADMIN — ROCURONIUM BROMIDE 10 MG: 10 INJECTION, SOLUTION INTRAVENOUS at 08:49

## 2025-07-14 RX ADMIN — FENTANYL CITRATE 50 MCG: 50 INJECTION INTRAMUSCULAR; INTRAVENOUS at 09:14

## 2025-07-14 RX ADMIN — WATER 2000 MG: 1 INJECTION INTRAMUSCULAR; INTRAVENOUS; SUBCUTANEOUS at 07:55

## 2025-07-14 RX ADMIN — FENTANYL CITRATE 100 MCG: 50 INJECTION INTRAMUSCULAR; INTRAVENOUS at 07:07

## 2025-07-14 RX ADMIN — ROCURONIUM BROMIDE 40 MG: 10 INJECTION, SOLUTION INTRAVENOUS at 07:45

## 2025-07-14 RX ADMIN — DIMENHYDRINATE 50 MG: 50 TABLET ORAL at 06:30

## 2025-07-14 RX ADMIN — GLYCOPYRROLATE 0.4 MG: 0.2 INJECTION INTRAMUSCULAR; INTRAVENOUS at 09:07

## 2025-07-14 RX ADMIN — Medication 0.1 MG: at 07:58

## 2025-07-14 RX ADMIN — Medication 0.1 MG: at 08:13

## 2025-07-14 RX ADMIN — METOCLOPRAMIDE HYDROCHLORIDE 10 MG: 5 INJECTION INTRAMUSCULAR; INTRAVENOUS at 10:57

## 2025-07-14 RX ADMIN — ROPIVACAINE HYDROCHLORIDE 30 ML: 5 INJECTION EPIDURAL; INFILTRATION; PERINEURAL at 07:15

## 2025-07-14 ASSESSMENT — PAIN DESCRIPTION - ORIENTATION
ORIENTATION: LOWER
ORIENTATION: LOWER

## 2025-07-14 ASSESSMENT — PAIN DESCRIPTION - LOCATION
LOCATION: ABDOMEN
LOCATION: ABDOMEN

## 2025-07-14 ASSESSMENT — PAIN DESCRIPTION - PAIN TYPE
TYPE: SURGICAL PAIN
TYPE: SURGICAL PAIN

## 2025-07-14 ASSESSMENT — PAIN SCALES - GENERAL
PAINLEVEL_OUTOF10: 0
PAINLEVEL_OUTOF10: 0
PAINLEVEL_OUTOF10: 5
PAINLEVEL_OUTOF10: 4
PAINLEVEL_OUTOF10: 3

## 2025-07-14 ASSESSMENT — PAIN - FUNCTIONAL ASSESSMENT: PAIN_FUNCTIONAL_ASSESSMENT: 0-10

## 2025-07-14 ASSESSMENT — PAIN DESCRIPTION - DESCRIPTORS
DESCRIPTORS: CRAMPING;ACHING
DESCRIPTORS: CRAMPING;ACHING

## 2025-07-14 NOTE — ANESTHESIA PRE PROCEDURE
Department of Anesthesiology  Preprocedure Note       Name:  Delilah Durant   Age:  30 y.o.  :  1995                                          MRN:  [id number]         Date:  2025      Surgeon: Surgeon(s):  Elodia Yip DO    Procedure: Procedure(s):  HYSTERECTOMY VAGINAL LAPAROSCOPIC ROBOTIC ASSISTED - Possible Bilateral Salping-Oophorectomy, Laparoscopic Colpopexy  VAGINAL REPAIR ANTERIOR AND POSTERIOR - Possible Anterior    Medications prior to admission:   Prior to Admission medications    Medication Sig Start Date End Date Taking? Authorizing Provider   polyethylene glycol (GLYCOLAX) 17 GM/SCOOP powder DISSOLVE 17 GRAMS IN 8 OUNCES OF LIQUID AND DRINK ONCE A DAY 25  Yes Dhiraj Salgado PA-C   ferrous sulfate ER (SLOW FE) 45 MG TBCR extended release tablet Take 1 tablet by mouth Daily   Yes ProviderWil MD   Ascorbic Acid (VITAMIN C) 250 MG tablet Take 2 tablets by mouth daily   Yes ProviderWil MD   b complex vitamins capsule Take 1 capsule by mouth daily   Yes ProviderWil MD   Probiotic Product (PROBIOTIC ADVANCED PO) Take by mouth   Yes ProviderWil MD   Magnesium Gluconate (MAGNESIUM 27 PO) Take 250 mg by mouth daily   Yes ProviderWil MD   mirabegron (MYRBETRIQ) 50 MG TB24 Take 50 mg by mouth daily 10/31/24  Yes Marietta Hernandez APRN - CNP   Omega-3 Fatty Acids (FP FISH OIL PO) Take by mouth daily   Yes ProviderWil MD   VITAMIN D PO Take 25 mcg by mouth daily   Yes ProviderWil MD       Current medications:    Current Facility-Administered Medications   Medication Dose Route Frequency Provider Last Rate Last Admin    lactated ringers infusion   IntraVENous Continuous Veronica Paul APRN - CRNA 100 mL/hr at 25 0642 New Bag at 25 0642    sodium chloride flush 0.9 % injection 5-40 mL  5-40 mL IntraVENous 2 times per day Lydia Claros PA-C        sodium chloride flush 0.9 % injection 5-40 mL

## 2025-07-14 NOTE — OP NOTE
Preoperative diagnosis: 1. Menorrhagia  2. History endometriosis  3. Adenomyosis  4. Cystocele  5. Pelvic congestion syndrome    Postoperative diagnosis: Same +  6. Uterine prolapse    Procedure:  Robotic-assisted Total Laparoscopic Hysterectomy with Bilateral Salpingectomy, Right ovarian cystectomy, Laparoscopic colpopexy    Surgeon: Dr. Elodia Bustillo    Asst.: Lydia Erazo PGY3    Anesthesia: Gen.    Estimated blood loss: 25 mL    Fluids: LR    Urine: 200 mL of clear urine    Condition: Stable    Complications: None    Findings: The patient had an anteverted uterus which sounded to 10 cm in depth.  There was apical and anterior prolapse noted prior to the colpopexy with good support after.  The tubes were grossly within normal limits.  The right ovary had a bilobed appearance with the medial aspect being cystic in nature.  The left ovary was normal.  Bilateral ureteral peristalsis was noted throughout the case and after closure of the vaginal cuff.    Specimen removed: Uterus and Right ovarian cyst and Bilateral tubes    Operative note: The patient was taken to the operating room where general anesthesia was obtained without difficulty.  She was prepped and draped usual sterile fashion in a dorsal lithotomy position.  Timeout was performed.  A weighted speculum was placed in the patient's vagina and the anterior lip of the cervix was grasped with a single-tooth tenaculum.  The cervix was progressively dilated and the uterus was sounded with the findings noted above.  A  care uterine manipulator was then placed.  A Rose catheter was placed and left to gravity drainage.  At this point attention was turned to the patient's abdomen where a supraumbilical incision was made with a scalpel.  An 8 mm skin incision was made.  A veress needle was then carefully introduced at a 45° angle while tenting the abdominal wall.  Intraabdominal placement was confirmed by use of water-filled

## 2025-07-14 NOTE — ANESTHESIA POSTPROCEDURE EVALUATION
Department of Anesthesiology  Postprocedure Note    Patient: Delilah Durant  MRN: [id number]  YOB: 1995  Date of evaluation: 7/14/2025    Procedure Summary       Date: 07/14/25 Room / Location: [place] [id number] / [facility]    Anesthesia Start: 0741 Anesthesia Stop: 0930    Procedure: HYSTERECTOMY VAGINAL LAPAROSCOPIC ROBOTIC ASSISTED -  Bilateral Salpingectomy, Laparoscopic Colpopexy, laparoscopic ovarian cystectomy right (Abdomen/Perineum) Diagnosis:       Menorrhagia with regular cycle      Pelvic congestion syndrome      Endometriosis determined by laparoscopy      Adenomyosis      Cystocele, midline      (Menorrhagia with regular cycle [N92.0])      (Pelvic congestion syndrome [N94.89])      (Endometriosis determined by laparoscopy [N80.9])      (Adenomyosis [N80.03])      (Cystocele, midline [N81.11])    Surgeons: Elodia Yip DO Responsible Provider: Garrett Wells APRN - CRNA    Anesthesia Type: general ASA Status: 1            Anesthesia Type: No value filed.    Jordon Phase I: Jordon Score: 9    Jordon Phase II: Jordon Score: 10    Anesthesia Post Evaluation    Patient location during evaluation: bedside  Patient participation: complete - patient participated  Level of consciousness: awake and alert  Pain score: 0  Airway patency: patent  Nausea & Vomiting: no nausea and no vomiting  Cardiovascular status: hemodynamically stable  Respiratory status: acceptable  Hydration status: stable  Multimodal analgesia pain management approach  Pain management: adequate    No notable events documented.

## 2025-07-14 NOTE — PROGRESS NOTES
Patient states pain better and ready to be discharged at this time. Discharge instructions given to pt and mother. Both verbalize understanding,deny any questions and/or concerns. Pt transfer off unit in wheelchair w/ all belongings.    Discharge Criteria    Inpatients must meet Criteria 1 through 7. All other patients are either YES or N/A. If a NO is chosen then Anesthesia or Surgeon must be notified.      1.  Minimum 30 minutes after last dose of sedative medication.    Yes      2.  Systolic BP between 90 - 160. Diastolic BP between 60 - 90.    Yes      3.  Pulse between 60 - 120    Yes      4.  Respirations between 8 - 25.    Yes      5.  SpO2 92% - 100%.    Yes      6.  Able to cough and swallow or return to baseline function.    Yes      7.  Alert and oriented or return to baseline mental status.    Yes      8.  Demonstrates controlled, coordinated movements, ambulates with steady gait, or return to baseline activity function.    Yes      9.  Minimal or no pain or nausea, or at a level tolerable and acceptable to patient.    Yes      10. Takes and retains oral fluids as allowed.    Yes      11. Procedural / perioperative site stable.  Minimal or no bleeding.    Yes          12. If GI endoscopy procedure, minimal or no abdominal distention or passing flatus.    N/A      13. Written discharge instructions and emergency telephone number provided.    Yes      14. Accompanied by a responsible adult.    Yes

## 2025-07-14 NOTE — ANESTHESIA PROCEDURE NOTES
Peripheral Block    Patient location during procedure: pre-op  Reason for block: post-op pain management and at surgeon's request  Start time: 7/14/2025 7:07 AM  End time: 7/14/2025 7:15 AM  Staffing  Performed: resident/CRNA   Resident/CRNA: Garrett Wells APRN - CRNA  Performed by: Garrett Wells APRN - CRNA  Authorized by: Garrett Wells APRN - CRNA    Preanesthetic Checklist  Completed: patient identified, IV checked, site marked, risks and benefits discussed, surgical/procedural consents, equipment checked, pre-op evaluation, timeout performed, anesthesia consent given, oxygen available and monitors applied/VS acknowledged  Peripheral Block   Patient position: supine  Prep: ChloraPrep  Provider prep: mask and sterile gloves  Patient monitoring: continuous pulse ox, IV access and responsive to questions  Block type: TAP and Rectus sheath  Laterality: bilateral  Injection technique: single-shot  Guidance: ultrasound guided  Local infiltration: ropivacaine and decadron (See MAR for details.)  Local infiltration: ropivacaine and decadron (See MAR for details.)    Needle   Needle type: Other   Needle gauge: 22 G  Needle localization: ultrasound guidance  Needle length: 11 cmOther needle type: pajunk  Assessment   Injection assessment: no paresthesia on injection, negative aspiration for heme and no intravascular symptoms  Paresthesia pain: none  Slow fractionated injection: yes  Hemodynamics: stable  Outcomes: uncomplicated and patient tolerated procedure well

## 2025-07-15 LAB — SURGICAL PATHOLOGY REPORT: NORMAL

## 2025-07-18 ENCOUNTER — HOSPITAL ENCOUNTER (OUTPATIENT)
Age: 30
Discharge: HOME | End: 2025-07-18
Payer: MEDICAID

## 2025-07-18 DIAGNOSIS — R79.89 HIGH SERUM FERRITIN: ICD-10-CM

## 2025-07-18 DIAGNOSIS — D50.0 IRON DEFICIENCY ANEMIA DUE TO CHRONIC BLOOD LOSS: ICD-10-CM

## 2025-07-18 DIAGNOSIS — N80.9 ENDOMETRIOSIS: ICD-10-CM

## 2025-07-18 LAB
BASOPHILS # BLD: <0.03 K/UL (ref 0–0.2)
BASOPHILS NFR BLD: 0 % (ref 0–2)
EOSINOPHIL # BLD: 0.1 K/UL (ref 0–0.44)
EOSINOPHILS RELATIVE PERCENT: 2 % (ref 1–4)
ERYTHROCYTE [DISTWIDTH] IN BLOOD BY AUTOMATED COUNT: 11.7 % (ref 11.8–14.4)
FERRITIN SERPL-MCNC: 44 NG/ML (ref 15–150)
HCT VFR BLD AUTO: 40.5 % (ref 36.3–47.1)
HGB BLD-MCNC: 13.5 G/DL (ref 11.9–15.1)
IMM GRANULOCYTES # BLD AUTO: <0.03 K/UL (ref 0–0.3)
IMM GRANULOCYTES NFR BLD: 0 %
IRON SATN MFR SERPL: 73 % (ref 20–55)
IRON SERPL-MCNC: 195 UG/DL (ref 37–145)
LYMPHOCYTES NFR BLD: 1.26 K/UL (ref 1.1–3.7)
LYMPHOCYTES RELATIVE PERCENT: 27 % (ref 24–43)
MCH RBC QN AUTO: 31.5 PG (ref 25.2–33.5)
MCHC RBC AUTO-ENTMCNC: 33.3 G/DL (ref 28.4–34.8)
MCV RBC AUTO: 94.6 FL (ref 82.6–102.9)
MONOCYTES NFR BLD: 0.45 K/UL (ref 0.1–1.2)
MONOCYTES NFR BLD: 10 % (ref 3–12)
NEUTROPHILS NFR BLD: 61 % (ref 36–65)
NEUTS SEG NFR BLD: 2.87 K/UL (ref 1.5–8.1)
NRBC BLD-RTO: 0 PER 100 WBC
PLATELET # BLD AUTO: 251 K/UL (ref 138–453)
PMV BLD AUTO: 9.9 FL (ref 8.1–13.5)
RBC # BLD AUTO: 4.28 M/UL (ref 3.95–5.11)
TIBC SERPL-MCNC: 267 UG/DL (ref 250–450)
UNSATURATED IRON BINDING CAPACITY: 72 UG/DL (ref 112–347)
WBC OTHER # BLD: 4.7 K/UL (ref 3.5–11.3)

## 2025-07-18 PROCEDURE — 83540 ASSAY OF IRON: CPT

## 2025-07-18 PROCEDURE — 82728 ASSAY OF FERRITIN: CPT

## 2025-07-18 PROCEDURE — 36415 COLL VENOUS BLD VENIPUNCTURE: CPT

## 2025-07-18 PROCEDURE — 83550 IRON BINDING TEST: CPT

## 2025-07-18 PROCEDURE — 85025 COMPLETE CBC W/AUTO DIFF WBC: CPT

## 2025-07-21 ENCOUNTER — OFFICE VISIT (OUTPATIENT)
Dept: ONCOLOGY | Age: 30
End: 2025-07-21
Payer: MEDICAID

## 2025-07-21 VITALS
RESPIRATION RATE: 18 BRPM | TEMPERATURE: 97.8 F | DIASTOLIC BLOOD PRESSURE: 62 MMHG | HEIGHT: 63 IN | HEART RATE: 97 BPM | BODY MASS INDEX: 23.04 KG/M2 | WEIGHT: 130 LBS | SYSTOLIC BLOOD PRESSURE: 103 MMHG

## 2025-07-21 DIAGNOSIS — N92.0 MENORRHAGIA WITH REGULAR CYCLE: ICD-10-CM

## 2025-07-21 DIAGNOSIS — R79.0 ABNORMAL IRON SATURATION: ICD-10-CM

## 2025-07-21 DIAGNOSIS — D50.0 IRON DEFICIENCY ANEMIA DUE TO CHRONIC BLOOD LOSS: Primary | ICD-10-CM

## 2025-07-21 PROCEDURE — 99214 OFFICE O/P EST MOD 30 MIN: CPT | Performed by: INTERNAL MEDICINE

## 2025-07-21 NOTE — PROGRESS NOTES
Patient ID: Delilah Durant, 1995, [id number], 30 y.o.  Referred by :  No ref. provider found   Reason for consultation: Low ferritin/iron deficient anemia      HISTORY OF PRESENT ILLNESS:    Oncologic History:    Delilah Durant is a very pleasant 30 y.o. female.  Who was seen by primary care physician for dizziness, labs done on January 2025, WBC at 4.7 hemoglobin 10.1, MCV 78.8 and iron panel with ferritin at 5 and saturation 53 with borderline low absolute reticulocyte count  Patient did have dizziness and tachycardia and concern of this is related to the anemia  She describes a heavy menstrual cycle due to endometriosis even recently it slowing down    Interval history/  History of Present Illness  The patient is a female seen for iron deficiency anemia.  She reports feeling well overall. She underwent a hysterectomy on 07/14/2025, during which she was advised to discontinue all medications, including iron, for a week. Her last menstrual cycle coincided with the week of her surgery. She had lab work done on Friday. She has not had any abdominal or weight loss surgeries in the past. She continues to take Myrbetriq and reports an improvement in her energy levels.  Iron panel has been reviewed and did show high iron saturation and high iron    SOCIAL HISTORY  She does not smoke.    FAMILY HISTORY  There is no family history of colon cancer.        Past Medical History:   Diagnosis Date    Abnormal Pap smear of cervix     Anemia     Depression 09/15/2021    Endometriosis     Overactive bladder 4/13/2023       Past Surgical History:   Procedure Laterality Date    APPENDECTOMY      COLPOSCOPY      HYSTERECTOMY (CERVIX STATUS UNKNOWN) N/A 7/14/2025    HYSTERECTOMY VAGINAL LAPAROSCOPIC ROBOTIC ASSISTED -  Bilateral Salpingectomy, Laparoscopic Colpopexy, laparoscopic ovarian cystectomy right performed by Elodia Yip DO at [facility] OR    LAPAROSCOPY      x2    TONSILLECTOMY         No Known

## 2025-07-23 ENCOUNTER — OFFICE VISIT (OUTPATIENT)
Dept: OBGYN | Age: 30
End: 2025-07-23

## 2025-07-23 VITALS
HEIGHT: 63 IN | BODY MASS INDEX: 23.04 KG/M2 | WEIGHT: 130 LBS | DIASTOLIC BLOOD PRESSURE: 62 MMHG | SYSTOLIC BLOOD PRESSURE: 112 MMHG

## 2025-07-23 DIAGNOSIS — Z09 POSTOPERATIVE EXAMINATION: Primary | ICD-10-CM

## 2025-07-23 PROCEDURE — 99024 POSTOP FOLLOW-UP VISIT: CPT

## 2025-07-23 NOTE — PROGRESS NOTES
Postop Progress Note    Subjective    Delilah Durant presents to the office for postop follow up.  Chief Complaint   Patient presents with    Post-Op Check     Pt is here today for post op check, pt had Robotic-assisted Total Laparoscopic Hysterectomy with Bilateral Salpingectomy, Right ovarian cystectomy, Laparoscopic colpopexy preformed on 7/14/25.     Objective    Vitals:    07/23/25 1101   BP: 112/62     General: alert, cooperative and no distress  Incision: healing well    Assessment  Doing well postoperatively.  Denies abnormal bleeding, discharge, cramping.  Doing well with restrictions.  Aware of benign pathology.  Abdominal incisions healing well.      Plan  1. Continue any current medications  2. Wound care discussed  3. Pt is to continue with activity restrictions   4. Usual diet  5. Follow up: 4 weeks     Electronically signed by Lydia Claros PA-C on 7/23/2025 at 1:06 PM

## 2025-08-26 ENCOUNTER — OFFICE VISIT (OUTPATIENT)
Dept: OBGYN | Age: 30
End: 2025-08-26

## 2025-08-26 VITALS
HEIGHT: 63 IN | WEIGHT: 134 LBS | BODY MASS INDEX: 23.74 KG/M2 | SYSTOLIC BLOOD PRESSURE: 110 MMHG | DIASTOLIC BLOOD PRESSURE: 60 MMHG

## 2025-08-26 DIAGNOSIS — Z09 POSTOPERATIVE EXAMINATION: Primary | ICD-10-CM

## 2025-08-26 PROCEDURE — 99024 POSTOP FOLLOW-UP VISIT: CPT | Performed by: OBSTETRICS & GYNECOLOGY

## 2025-08-26 RX ORDER — CEPHALEXIN 500 MG/1
500 CAPSULE ORAL 2 TIMES DAILY
Qty: 14 CAPSULE | Refills: 0 | Status: SHIPPED | OUTPATIENT
Start: 2025-08-26 | End: 2025-09-02

## 2025-09-04 ENCOUNTER — OFFICE VISIT (OUTPATIENT)
Dept: CARDIOLOGY | Age: 30
End: 2025-09-04
Payer: MEDICAID

## 2025-09-04 VITALS
WEIGHT: 136.8 LBS | SYSTOLIC BLOOD PRESSURE: 106 MMHG | RESPIRATION RATE: 18 BRPM | HEART RATE: 102 BPM | BODY MASS INDEX: 24.24 KG/M2 | HEIGHT: 63 IN | OXYGEN SATURATION: 99 % | DIASTOLIC BLOOD PRESSURE: 65 MMHG

## 2025-09-04 DIAGNOSIS — R55 PRE-SYNCOPE: ICD-10-CM

## 2025-09-04 DIAGNOSIS — D50.0 IRON DEFICIENCY ANEMIA DUE TO CHRONIC BLOOD LOSS: ICD-10-CM

## 2025-09-04 DIAGNOSIS — R42 VERTIGO: ICD-10-CM

## 2025-09-04 DIAGNOSIS — R00.0 TACHYCARDIA: ICD-10-CM

## 2025-09-04 DIAGNOSIS — R42 ORTHOSTATIC DIZZINESS: Primary | ICD-10-CM

## 2025-09-04 DIAGNOSIS — R00.2 PALPITATIONS: ICD-10-CM

## 2025-09-04 PROCEDURE — 99213 OFFICE O/P EST LOW 20 MIN: CPT | Performed by: INTERNAL MEDICINE

## (undated) DEVICE — INSUFFLATION NEEDLE TO ESTABLISH PNEUMOPERITONEUM.: Brand: INSUFFLATION NEEDLE

## (undated) DEVICE — TOWEL,OR,DSP,ST,BLUE,STD,4/PK,20PK/CS: Brand: MEDLINE

## (undated) DEVICE — GLOVE SURG SZ 65 CRM LTX FREE POLYISOPRENE POLYMER BEAD ANTI

## (undated) DEVICE — NDLCTR: FOAM/MAG 40CT 64/CS: Brand: MEDICAL ACTION INDUSTRIES

## (undated) DEVICE — ELECTRO LUBE IS A SINGLE PATIENT USE DEVICE THAT IS INTENDED TO BE USED ON ELECTROSURGICAL ELECTRODES TO REDUCE STICKING.: Brand: KEY SURGICAL ELECTRO LUBE

## (undated) DEVICE — SOLUTION IV 50ML 0.9% SOD CHL PLAS CONT USP VIAFLX

## (undated) DEVICE — SUTURE VICRYL + SZ 2-0 L27IN ABSRB VLT UR-6 5/8 CIR TAPR PNT VCP602H

## (undated) DEVICE — HYPODERMIC SAFETY NEEDLE: Brand: MAGELLAN

## (undated) DEVICE — GLOVE SURG SZ 65 L12IN FNGR THK79MIL GRN LTX FREE

## (undated) DEVICE — Device

## (undated) DEVICE — TOTAL TRAY, DB, 100% SILI FOLEY, 16FR 10: Brand: MEDLINE

## (undated) DEVICE — SOLUTION IV 1000ML 0.9% SOD CHL FOR IRRIG PLAS CONT

## (undated) DEVICE — SPONGE,PEANUT,XRAY,ST,SM,3/8",5/CARD: Brand: MEDLINE INDUSTRIES, INC.

## (undated) DEVICE — ENDOSCOPIC KIT CLN SWAB MICROFIBER CLTH SCP CLEANOR DISP

## (undated) DEVICE — MERCY TIFFIN LITHOTOMY: Brand: MEDLINE INDUSTRIES, INC.

## (undated) DEVICE — SUTURE VICRYL + SZ 3-0 L27IN ABSRB UD L26MM SH 1/2 CIR VCP416H

## (undated) DEVICE — SUTURE MONOCRYL SZ 4-0 L27IN ABSRB UD L19MM PS-2 1/2 CIR PRIM Y426H

## (undated) DEVICE — SUTURE V-LOC 180 SZ 2-0 L9IN ABSRB GRN L27MM GS-22 1/2 CIR VLOCL2145

## (undated) DEVICE — BLADELESS OBTURATOR: Brand: WECK VISTA

## (undated) DEVICE — SYRINGE MED 10ML LUERLOCK TIP W/O SFTY DISP

## (undated) DEVICE — DRAPE C ARM W/ POLY STRP W42XL72IN FOR MOB XR

## (undated) DEVICE — SYSTEM POS SZ 36 X 20 X 1 IN INTEGR ADJ ARM PROTECTOR LIFT

## (undated) DEVICE — GOWN,SIRUS,POLYRNF,BRTHSLV,LG,30/CS: Brand: MEDLINE

## (undated) DEVICE — SUTURE VICRYL + SZ 0 L18IN ABSRB UD L36MM CT-1 1/2 CIR VCP840D

## (undated) DEVICE — SOLUTION IRRIG 1000ML 0.9% SOD CHL USP POUR PLAS BTL

## (undated) DEVICE — ARM DRAPE

## (undated) DEVICE — SUTURE MONOCRYL SZ 4-0 L18IN ABSRB UD P-3 L13MM 3/8 CIR PRIM Y494G

## (undated) DEVICE — DECANTER BAG 9": Brand: MEDLINE INDUSTRIES, INC.

## (undated) DEVICE — MTHZ GYN LAPAROSCOPY: Brand: MEDLINE INDUSTRIES, INC.

## (undated) DEVICE — TIP COVER ACCESSORY

## (undated) DEVICE — INTENDED FOR TISSUE SEPARATION, AND OTHER PROCEDURES THAT REQUIRE A SHARP SURGICAL BLADE TO PUNCTURE OR CUT.: Brand: BARD-PARKER ® STAINLESS STEEL BLADES

## (undated) DEVICE — SEAL

## (undated) DEVICE — SYRINGE 20ML LL S/C 50

## (undated) DEVICE — VCARE MEDIUM, UTERINE MANIPULATOR, VAGINAL-CERVICAL-AHLUWALIA'S-RETRACTOR-ELEVATOR: Brand: VCARE